# Patient Record
Sex: MALE | Race: NATIVE HAWAIIAN OR OTHER PACIFIC ISLANDER | Employment: OTHER | URBAN - METROPOLITAN AREA
[De-identification: names, ages, dates, MRNs, and addresses within clinical notes are randomized per-mention and may not be internally consistent; named-entity substitution may affect disease eponyms.]

---

## 2019-04-02 ENCOUNTER — OFFICE VISIT (OUTPATIENT)
Dept: FAMILY MEDICINE CLINIC | Facility: CLINIC | Age: 64
End: 2019-04-02
Payer: MEDICARE

## 2019-04-02 VITALS
SYSTOLIC BLOOD PRESSURE: 125 MMHG | DIASTOLIC BLOOD PRESSURE: 81 MMHG | TEMPERATURE: 98 F | WEIGHT: 227 LBS | HEIGHT: 67 IN | HEART RATE: 69 BPM | BODY MASS INDEX: 35.63 KG/M2

## 2019-04-02 DIAGNOSIS — E11.42 TYPE 2 DIABETES MELLITUS WITH DIABETIC POLYNEUROPATHY, WITHOUT LONG-TERM CURRENT USE OF INSULIN (HCC): Primary | ICD-10-CM

## 2019-04-02 DIAGNOSIS — R35.1 BENIGN PROSTATIC HYPERPLASIA WITH NOCTURIA: ICD-10-CM

## 2019-04-02 DIAGNOSIS — E78.00 PURE HYPERCHOLESTEROLEMIA: ICD-10-CM

## 2019-04-02 DIAGNOSIS — N40.1 BENIGN PROSTATIC HYPERPLASIA WITH NOCTURIA: ICD-10-CM

## 2019-04-02 DIAGNOSIS — I10 ESSENTIAL HYPERTENSION: ICD-10-CM

## 2019-04-02 PROCEDURE — G0463 HOSPITAL OUTPT CLINIC VISIT: HCPCS | Performed by: FAMILY MEDICINE

## 2019-04-02 PROCEDURE — 99204 OFFICE O/P NEW MOD 45 MIN: CPT | Performed by: FAMILY MEDICINE

## 2019-04-02 RX ORDER — GLIPIZIDE 5 MG/1
TABLET ORAL
Refills: 2 | COMMUNITY
Start: 2018-12-30 | End: 2019-04-02

## 2019-04-02 RX ORDER — MELOXICAM 7.5 MG/1
TABLET ORAL
Refills: 1 | COMMUNITY
Start: 2019-02-22 | End: 2019-10-01

## 2019-04-02 RX ORDER — GABAPENTIN 600 MG/1
600 TABLET ORAL 3 TIMES DAILY
COMMUNITY
End: 2019-04-02

## 2019-04-02 RX ORDER — BLOOD SUGAR DIAGNOSTIC
STRIP MISCELLANEOUS
Refills: 5 | COMMUNITY
Start: 2019-02-28 | End: 2019-06-10

## 2019-04-02 RX ORDER — DULAGLUTIDE 1.5 MG/.5ML
1.5 INJECTION, SOLUTION SUBCUTANEOUS WEEKLY
Qty: 12 PEN | Refills: 2 | Status: SHIPPED | OUTPATIENT
Start: 2019-04-02 | End: 2019-06-10

## 2019-04-02 RX ORDER — DULAGLUTIDE 1.5 MG/.5ML
INJECTION, SOLUTION SUBCUTANEOUS
Refills: 2 | COMMUNITY
Start: 2019-02-25 | End: 2019-04-02

## 2019-04-02 RX ORDER — ATORVASTATIN CALCIUM 20 MG/1
20 TABLET, FILM COATED ORAL NIGHTLY
COMMUNITY
End: 2019-04-02

## 2019-04-02 RX ORDER — ATORVASTATIN CALCIUM 20 MG/1
20 TABLET, FILM COATED ORAL NIGHTLY
Qty: 90 TABLET | Refills: 3 | Status: SHIPPED | OUTPATIENT
Start: 2019-04-02 | End: 2020-04-04

## 2019-04-02 RX ORDER — KETOCONAZOLE 20 MG/G
CREAM TOPICAL
Refills: 1 | COMMUNITY
Start: 2019-02-25 | End: 2021-06-07

## 2019-04-02 RX ORDER — LOSARTAN POTASSIUM 25 MG/1
TABLET ORAL DAILY
COMMUNITY
End: 2019-04-02

## 2019-04-02 RX ORDER — TAMSULOSIN HYDROCHLORIDE 0.4 MG/1
CAPSULE ORAL
Refills: 3 | COMMUNITY
Start: 2019-02-05 | End: 2019-04-02

## 2019-04-02 RX ORDER — LOSARTAN POTASSIUM 25 MG/1
25 TABLET ORAL DAILY
Qty: 90 TABLET | Refills: 2 | Status: SHIPPED | OUTPATIENT
Start: 2019-04-02 | End: 2019-12-30

## 2019-04-02 RX ORDER — TAMSULOSIN HYDROCHLORIDE 0.4 MG/1
CAPSULE ORAL
Qty: 90 CAPSULE | Refills: 2 | Status: SHIPPED | OUTPATIENT
Start: 2019-04-02 | End: 2020-09-14

## 2019-04-02 RX ORDER — METOPROLOL SUCCINATE 25 MG/1
TABLET, EXTENDED RELEASE ORAL
Refills: 2 | COMMUNITY
Start: 2019-02-25 | End: 2019-04-02

## 2019-04-02 RX ORDER — CANAGLIFLOZIN 300 MG/1
TABLET, FILM COATED ORAL
Refills: 0 | COMMUNITY
Start: 2018-12-04 | End: 2019-04-02

## 2019-04-02 RX ORDER — GLIMEPIRIDE 2 MG/1
2 TABLET ORAL
Qty: 90 TABLET | Refills: 2 | Status: SHIPPED | OUTPATIENT
Start: 2019-04-02 | End: 2019-06-10

## 2019-04-02 RX ORDER — OMEGA-3-ACID ETHYL ESTERS 1 G/1
1 CAPSULE, LIQUID FILLED ORAL DAILY
COMMUNITY
End: 2019-04-02

## 2019-04-02 RX ORDER — METOPROLOL SUCCINATE 25 MG/1
TABLET, EXTENDED RELEASE ORAL
Qty: 90 TABLET | Refills: 2 | Status: SHIPPED | OUTPATIENT
Start: 2019-04-02 | End: 2020-01-01

## 2019-04-02 RX ORDER — PREGABALIN 50 MG/1
50 CAPSULE ORAL 3 TIMES DAILY
Qty: 270 CAPSULE | Refills: 2 | Status: SHIPPED | OUTPATIENT
Start: 2019-04-02 | End: 2020-05-18

## 2019-04-02 NOTE — PROGRESS NOTES
4/2/2019  3:38 PM    Nadeem Barrientos is a 61year old male. Chief complaint(s): Patient presents with:  Establish Care  Diabetes  HTN  Hyperlipidemia    HPI:     Nadeem Barrientos primary complaint is regarding multiple new patient/ complaints.      Pa Current treatment includes Atorvastatin 20 mg (medication) and a low cholesterol/low fat diet. Compliance with treatment has been good. he denies experiencing any hypercholesterolemia related symptoms.   Average lipid levels with medication run slightly M PREVENT ANOTHER HEART ATTACK Disp: 90 tablet Rfl: 2   Losartan Potassium 25 MG Oral Tab Take 1 tablet (25 mg total) by mouth daily. Disp: 90 tablet Rfl: 2   atorvastatin 20 MG Oral Tab Take 1 tablet (20 mg total) by mouth nightly.  Disp: 90 tablet Rfl: 3 Pulmonary/Chest:   Clear to ascultation bilaterally. Abdominal: Soft. Normal appearance and bowel sounds are normal. There is no tenderness. Feet: diabetic foot exam performed    Right Foot:   Protective Sensation: 10 sites tested. 8 sites sensed. tablet (25 mg total) by mouth daily. , Disp: 90 tablet, Rfl: 2  •  atorvastatin 20 MG Oral Tab, Take 1 tablet (20 mg total) by mouth nightly., Disp: 90 tablet, Rfl: 3  •  glimepiride 2 MG Oral Tab, Take 1 tablet (2 mg total) by mouth daily with breakfast., follow-up visit in 4 months. COUNSELING: The patient was counseled concerning the relationship between diabetes control and macrovascular disease including cardiovascular, cerebrovascular and peripheral vascular disease.  The patient was counseled con alcohol, perform routine monitoring of blood pressure with home blood pressure cuff, exercise, reduction of dietary salt intake, take medication as prescribed, try not to miss doses, smoking cessation, weight loss, and stress reduction.     FOLLOW-UP: Sched effects. REFUSALS:  none. FOLLOW-UP: Schedule a follow-up visit in 6 months.       4. Benign prostatic hyperplasia with nocturia    MEDICATIONS: CPM    Requested Prescriptions     Signed Prescriptions Disp Refills   • TRULICITY 1.5 NV[facility]5.6GU [facility]  Requested Prescriptions     Signed Prescriptions Disp Refills   • TRULICITY 1.5 SN/5.1TE Subcutaneous Solution Pen-injector 12 pen 2     Sig: Inject 1.5 mg into the skin once a week.    • tamsulosin HCl 0.4 MG Oral Cap 90 capsule 2     Sig: TK 1 C P

## 2019-04-03 ENCOUNTER — APPOINTMENT (OUTPATIENT)
Dept: LAB | Age: 64
End: 2019-04-03
Attending: FAMILY MEDICINE
Payer: MEDICARE

## 2019-04-03 DIAGNOSIS — E11.42 TYPE 2 DIABETES MELLITUS WITH DIABETIC POLYNEUROPATHY, WITHOUT LONG-TERM CURRENT USE OF INSULIN (HCC): ICD-10-CM

## 2019-04-03 DIAGNOSIS — E78.00 PURE HYPERCHOLESTEROLEMIA: ICD-10-CM

## 2019-04-03 PROCEDURE — 82570 ASSAY OF URINE CREATININE: CPT

## 2019-04-03 PROCEDURE — 82043 UR ALBUMIN QUANTITATIVE: CPT

## 2019-04-03 PROCEDURE — 80061 LIPID PANEL: CPT

## 2019-04-03 PROCEDURE — 83036 HEMOGLOBIN GLYCOSYLATED A1C: CPT

## 2019-04-03 PROCEDURE — 84443 ASSAY THYROID STIM HORMONE: CPT

## 2019-04-03 PROCEDURE — 80053 COMPREHEN METABOLIC PANEL: CPT

## 2019-04-03 PROCEDURE — 36415 COLL VENOUS BLD VENIPUNCTURE: CPT

## 2019-04-09 ENCOUNTER — TELEPHONE (OUTPATIENT)
Dept: FAMILY MEDICINE CLINIC | Facility: CLINIC | Age: 64
End: 2019-04-09

## 2019-04-22 NOTE — TELEPHONE ENCOUNTER
Phone call made spoke with patient name and  verified. Advised pt of his diabetes being uncontrolled advised that Dr Bruce Frausto is recommending he follow a diet, exercise and f/u with MD in 2 months.  Patient verbalized understanding stated that he is in [facility] [place]

## 2019-06-10 ENCOUNTER — OFFICE VISIT (OUTPATIENT)
Dept: FAMILY MEDICINE CLINIC | Facility: CLINIC | Age: 64
End: 2019-06-10
Payer: MEDICARE

## 2019-06-10 VITALS
HEIGHT: 67 IN | TEMPERATURE: 98 F | WEIGHT: 234.81 LBS | DIASTOLIC BLOOD PRESSURE: 74 MMHG | HEART RATE: 61 BPM | SYSTOLIC BLOOD PRESSURE: 130 MMHG | BODY MASS INDEX: 36.85 KG/M2

## 2019-06-10 DIAGNOSIS — M54.32 BILATERAL SCIATICA: ICD-10-CM

## 2019-06-10 DIAGNOSIS — R35.1 BENIGN PROSTATIC HYPERPLASIA WITH NOCTURIA: ICD-10-CM

## 2019-06-10 DIAGNOSIS — E11.65 UNCONTROLLED TYPE 2 DIABETES MELLITUS WITH HYPERGLYCEMIA (HCC): Primary | ICD-10-CM

## 2019-06-10 DIAGNOSIS — N40.1 BENIGN PROSTATIC HYPERPLASIA WITH NOCTURIA: ICD-10-CM

## 2019-06-10 DIAGNOSIS — M54.31 BILATERAL SCIATICA: ICD-10-CM

## 2019-06-10 PROCEDURE — 99214 OFFICE O/P EST MOD 30 MIN: CPT | Performed by: FAMILY MEDICINE

## 2019-06-10 PROCEDURE — G0463 HOSPITAL OUTPT CLINIC VISIT: HCPCS | Performed by: FAMILY MEDICINE

## 2019-06-10 RX ORDER — BLOOD SUGAR DIAGNOSTIC
STRIP MISCELLANEOUS
COMMUNITY
Start: 2016-01-26

## 2019-06-10 RX ORDER — BLOOD SUGAR DIAGNOSTIC
STRIP MISCELLANEOUS
Qty: 100 STRIP | Refills: 5 | Status: SHIPPED | OUTPATIENT
Start: 2019-06-10 | End: 2021-09-27

## 2019-06-10 RX ORDER — GLIMEPIRIDE 2 MG/1
2 TABLET ORAL
Qty: 90 TABLET | Refills: 2 | Status: SHIPPED | OUTPATIENT
Start: 2019-06-10 | End: 2020-03-25

## 2019-06-10 RX ORDER — OMEGA-3-ACID ETHYL ESTERS 1 G/1
1 CAPSULE, LIQUID FILLED ORAL DAILY
COMMUNITY
Start: 2019-06-06 | End: 2021-09-27

## 2019-06-10 RX ORDER — CELECOXIB 200 MG/1
CAPSULE ORAL
COMMUNITY
Start: 2018-12-04 | End: 2020-05-18

## 2019-06-10 RX ORDER — ASPIRIN 81 MG/1
81 TABLET ORAL
COMMUNITY
Start: 2015-02-09 | End: 2019-06-10

## 2019-06-10 RX ORDER — DULAGLUTIDE 1.5 MG/.5ML
1.5 INJECTION, SOLUTION SUBCUTANEOUS WEEKLY
Qty: 12 PEN | Refills: 2 | Status: SHIPPED | OUTPATIENT
Start: 2019-06-10 | End: 2020-05-15

## 2019-06-10 RX ORDER — ASPIRIN 81 MG/1
81 TABLET ORAL DAILY
Qty: 100 TABLET | Refills: 2 | Status: SHIPPED | OUTPATIENT
Start: 2019-06-10

## 2019-06-10 NOTE — PROGRESS NOTES
6/10/2019  3:17 PM    Shalonda Marroquin is a 61year old male. Chief complaint(s): Patient presents with:  Diabetes: f/u  Diabetes  BPH  Chronic back pain. Sciatica    HPI:     Shalonda Marroquin primary complaint is regarding multiple complaintsDiabetes. relief of back pain with medication: NSAID: NSAIDs. Prior back pain hx: recurrent self limited episodes of low back pain in the past, previous osteoarthritis of lumbar spine and previous herniated disc.    Associated symptoms include;   positive numbness or Rfl: 2   Losartan Potassium 25 MG Oral Tab Take 1 tablet (25 mg total) by mouth daily. Disp: 90 tablet Rfl: 2   atorvastatin 20 MG Oral Tab Take 1 tablet (20 mg total) by mouth nightly.  Disp: 90 tablet Rfl: 3   glimepiride 2 MG Oral Tab Take 1 tablet (2 mg performed  Skin: No rash noted. Psychiatric:   Appropriate affect and demeanor.        LABORATORY RESULTS:   No results found for: Dominic Queen   Results for orders placed or performed in visit on 04/03/19   Delmis Hurtado A&P    LABORATORY & ORDERS: Blood test(s) ordered today ; Orders Placed This Encounter      PSA, Total with Reflex to Free [E]    Additional orders include:   MEDICATIONS:  •  Alcohol Swabs (ALCOHOL PADS) 70 % Does not apply Pads, For use prior to checking (ONETOUCH VERIO) In Vitro Strip 100 strip 5     Sig: TEST BID UTD   • TRULICITY 1.5 EI/2.4LX Subcutaneous Solution Pen-injector 12 pen 2     Sig: Inject 1.5 mg into the skin once a week.       REFERRALS: Ophthalmologist    RECOMMENDATIONS: instructed in use Orders Placed This Encounter         Pain Management Referral - [place] Location         RECOMMENDATIONS given include: Please, call our office with any questions or concerns.  Notify Dr Obed Conroy or the [facility] if there is a deterioration or worsen

## 2019-06-11 ENCOUNTER — TELEPHONE (OUTPATIENT)
Dept: FAMILY MEDICINE CLINIC | Facility: CLINIC | Age: 64
End: 2019-06-11

## 2019-06-11 ENCOUNTER — APPOINTMENT (OUTPATIENT)
Dept: LAB | Age: 64
End: 2019-06-11
Attending: FAMILY MEDICINE
Payer: MEDICARE

## 2019-06-11 PROCEDURE — 36415 COLL VENOUS BLD VENIPUNCTURE: CPT | Performed by: FAMILY MEDICINE

## 2019-06-11 PROCEDURE — 84153 ASSAY OF PSA TOTAL: CPT | Performed by: FAMILY MEDICINE

## 2019-06-17 ENCOUNTER — TELEPHONE (OUTPATIENT)
Dept: FAMILY MEDICINE CLINIC | Facility: CLINIC | Age: 64
End: 2019-06-17

## 2019-06-17 NOTE — TELEPHONE ENCOUNTER
Urology referral faxed to [phone] as requested by patient who's at his appointment, fax confirmation received.

## 2019-07-19 ENCOUNTER — TELEPHONE (OUTPATIENT)
Dept: FAMILY MEDICINE CLINIC | Facility: CLINIC | Age: 64
End: 2019-07-19

## 2019-10-03 RX ORDER — MELOXICAM 7.5 MG/1
TABLET ORAL
Qty: 90 TABLET | Refills: 0 | Status: SHIPPED | OUTPATIENT
Start: 2019-10-03 | End: 2019-12-30

## 2019-10-03 NOTE — TELEPHONE ENCOUNTER
Please advise in regards to refill request. Thank You  Refill Protocol Appointment Criteria  · Appointment scheduled in the past 6 months or in the next 3 months  Recent Outpatient Visits            3 months ago Uncontrolled type 2 diabetes mellitus with h

## 2019-12-30 RX ORDER — LOSARTAN POTASSIUM 25 MG/1
TABLET ORAL
Qty: 90 TABLET | Refills: 1 | Status: SHIPPED | OUTPATIENT
Start: 2019-12-30 | End: 2020-05-15

## 2019-12-30 RX ORDER — MELOXICAM 7.5 MG/1
TABLET ORAL
Qty: 90 TABLET | Refills: 0 | Status: SHIPPED | OUTPATIENT
Start: 2019-12-30 | End: 2020-03-30

## 2020-01-01 RX ORDER — METOPROLOL SUCCINATE 25 MG/1
TABLET, EXTENDED RELEASE ORAL
Qty: 90 TABLET | Refills: 0 | Status: SHIPPED | OUTPATIENT
Start: 2020-01-01 | End: 2020-03-30

## 2020-01-24 ENCOUNTER — TELEPHONE (OUTPATIENT)
Dept: CASE MANAGEMENT | Age: 65
End: 2020-01-24

## 2020-01-30 NOTE — TELEPHONE ENCOUNTER
Patient called back and was given information below. He states that he is currently in [facility] and will be back in April and will schedule for then.

## 2020-03-03 ENCOUNTER — OFFICE VISIT (OUTPATIENT)
Dept: FAMILY MEDICINE CLINIC | Facility: CLINIC | Age: 65
End: 2020-03-03
Payer: MEDICARE

## 2020-03-03 VITALS
TEMPERATURE: 98 F | DIASTOLIC BLOOD PRESSURE: 68 MMHG | SYSTOLIC BLOOD PRESSURE: 109 MMHG | WEIGHT: 233 LBS | BODY MASS INDEX: 36.57 KG/M2 | HEIGHT: 67 IN | HEART RATE: 61 BPM

## 2020-03-03 DIAGNOSIS — E11.65 UNCONTROLLED TYPE 2 DIABETES MELLITUS WITH HYPERGLYCEMIA (HCC): Primary | ICD-10-CM

## 2020-03-03 PROBLEM — E66.01 SEVERE OBESITY (BMI 35.0-39.9) WITH COMORBIDITY (HCC): Chronic | Status: ACTIVE | Noted: 2020-03-03

## 2020-03-03 PROCEDURE — 99214 OFFICE O/P EST MOD 30 MIN: CPT | Performed by: FAMILY MEDICINE

## 2020-03-03 RX ORDER — EMPAGLIFLOZIN, METFORMIN HYDROCHLORIDE 25; 1000 MG/1; MG/1
1 TABLET, EXTENDED RELEASE ORAL DAILY
Qty: 90 TABLET | Refills: 1 | Status: SHIPPED | OUTPATIENT
Start: 2020-03-03 | End: 2020-05-15

## 2020-03-03 RX ORDER — EMPAGLIFLOZIN, METFORMIN HYDROCHLORIDE 25; 1000 MG/1; MG/1
TABLET, EXTENDED RELEASE ORAL
COMMUNITY
Start: 2019-10-24 | End: 2020-03-03

## 2020-03-03 RX ORDER — DICLOFENAC POTASSIUM 50 MG/1
50 TABLET, FILM COATED ORAL 3 TIMES DAILY
Qty: 25 TABLET | Refills: 1 | Status: SHIPPED | OUTPATIENT
Start: 2020-03-03 | End: 2020-03-04

## 2020-03-03 NOTE — PROGRESS NOTES
Patient presents with:  Checkup    HPI:   Rachel Flow is a 59year old male who presents to clinic for DM follow up. Current medication regimen for DM includes: Trulicity, Synjardy and glimepiride, losartan, atorvastatin  Last HbA1C: 8.3 %.   Diet co

## 2020-03-04 ENCOUNTER — TELEPHONE (OUTPATIENT)
Dept: FAMILY MEDICINE CLINIC | Facility: CLINIC | Age: 65
End: 2020-03-04

## 2020-03-04 ENCOUNTER — LAB ENCOUNTER (OUTPATIENT)
Dept: LAB | Age: 65
End: 2020-03-04
Attending: FAMILY MEDICINE
Payer: MEDICARE

## 2020-03-04 DIAGNOSIS — M54.32 BILATERAL SCIATICA: Primary | ICD-10-CM

## 2020-03-04 DIAGNOSIS — M54.32 BILATERAL SCIATICA: ICD-10-CM

## 2020-03-04 DIAGNOSIS — M54.31 BILATERAL SCIATICA: ICD-10-CM

## 2020-03-04 DIAGNOSIS — M54.31 BILATERAL SCIATICA: Primary | ICD-10-CM

## 2020-03-04 DIAGNOSIS — E11.65 UNCONTROLLED TYPE 2 DIABETES MELLITUS WITH HYPERGLYCEMIA (HCC): ICD-10-CM

## 2020-03-04 LAB
ALBUMIN SERPL-MCNC: 3.9 G/DL (ref 3.4–5)
ALBUMIN/GLOB SERPL: 1.1 {RATIO} (ref 1–2)
ALP LIVER SERPL-CCNC: 66 U/L (ref 45–117)
ALT SERPL-CCNC: 40 U/L (ref 16–61)
ANION GAP SERPL CALC-SCNC: 6 MMOL/L (ref 0–18)
AST SERPL-CCNC: 18 U/L (ref 15–37)
BILIRUB SERPL-MCNC: 0.6 MG/DL (ref 0.1–2)
BUN BLD-MCNC: 10 MG/DL (ref 7–18)
BUN/CREAT SERPL: 13.7 (ref 10–20)
CALCIUM BLD-MCNC: 9.5 MG/DL (ref 8.5–10.1)
CHLORIDE SERPL-SCNC: 106 MMOL/L (ref 98–112)
CHOLEST SMN-MCNC: 188 MG/DL (ref ?–200)
CO2 SERPL-SCNC: 27 MMOL/L (ref 21–32)
CREAT BLD-MCNC: 0.73 MG/DL (ref 0.7–1.3)
CREAT UR-SCNC: 68.7 MG/DL
EST. AVERAGE GLUCOSE BLD GHB EST-MCNC: 186 MG/DL (ref 68–126)
GLOBULIN PLAS-MCNC: 3.4 G/DL (ref 2.8–4.4)
GLUCOSE BLD-MCNC: 141 MG/DL (ref 70–99)
HBA1C MFR BLD HPLC: 8.1 % (ref ?–5.7)
HDLC SERPL-MCNC: 39 MG/DL (ref 40–59)
LDLC SERPL CALC-MCNC: 86 MG/DL (ref ?–100)
M PROTEIN MFR SERPL ELPH: 7.3 G/DL (ref 6.4–8.2)
MICROALBUMIN UR-MCNC: <0.5 MG/DL
NONHDLC SERPL-MCNC: 149 MG/DL (ref ?–130)
OSMOLALITY SERPL CALC.SUM OF ELEC: 289 MOSM/KG (ref 275–295)
PATIENT FASTING Y/N/NP: YES
PATIENT FASTING Y/N/NP: YES
POTASSIUM SERPL-SCNC: 4 MMOL/L (ref 3.5–5.1)
SODIUM SERPL-SCNC: 139 MMOL/L (ref 136–145)
TRIGL SERPL-MCNC: 317 MG/DL (ref 30–149)
VLDLC SERPL CALC-MCNC: 63 MG/DL (ref 0–30)

## 2020-03-04 PROCEDURE — 83036 HEMOGLOBIN GLYCOSYLATED A1C: CPT

## 2020-03-04 PROCEDURE — 82570 ASSAY OF URINE CREATININE: CPT

## 2020-03-04 PROCEDURE — 82043 UR ALBUMIN QUANTITATIVE: CPT

## 2020-03-04 PROCEDURE — 36415 COLL VENOUS BLD VENIPUNCTURE: CPT

## 2020-03-04 PROCEDURE — 80061 LIPID PANEL: CPT

## 2020-03-04 PROCEDURE — 80053 COMPREHEN METABOLIC PANEL: CPT

## 2020-03-04 NOTE — TELEPHONE ENCOUNTER
Order placed for diclofenac sodium.     - Diclofenac Sodium 50 MG Oral Tab EC; Take 1 tablet (50 mg total) by mouth 2 (two) times daily. Dispense: 30 tablet;  Refill: 1

## 2020-03-04 NOTE — TELEPHONE ENCOUNTER
Pharmacy called in stating that script below is not covered through insurance. He states that the Diclofenac Sodium would be covered in generic form. Please advise.      Current Outpatient Medications:   •  Diclofenac Potassium 50 MG Oral Tab, Take 1 ta

## 2020-03-05 RX ORDER — DICLOFENAC POTASSIUM 50 MG/1
TABLET, FILM COATED ORAL
Qty: 281 TABLET | Refills: 0 | OUTPATIENT
Start: 2020-03-05

## 2020-03-06 NOTE — TELEPHONE ENCOUNTER
Diclofenac sodium was requested as diclofenac potassium was not covered by insurance. Should have been sent to pharmacy yesterday.

## 2020-03-09 NOTE — PROGRESS NOTES
With [facility]  MEENAKSHI#[id number], left message to  patient's voice mail. Spoke with spouse (not GITA), message left to tell patient to call us back regarding the test results. No MyChart.

## 2020-03-17 DIAGNOSIS — M54.31 BILATERAL SCIATICA: ICD-10-CM

## 2020-03-17 DIAGNOSIS — M54.32 BILATERAL SCIATICA: ICD-10-CM

## 2020-03-25 DIAGNOSIS — E11.65 UNCONTROLLED TYPE 2 DIABETES MELLITUS WITH HYPERGLYCEMIA (HCC): ICD-10-CM

## 2020-03-25 RX ORDER — GLIMEPIRIDE 2 MG/1
TABLET ORAL
Qty: 30 TABLET | Refills: 0 | Status: SHIPPED | OUTPATIENT
Start: 2020-03-25 | End: 2020-03-25

## 2020-03-25 RX ORDER — GLIMEPIRIDE 2 MG/1
TABLET ORAL
Qty: 30 TABLET | Refills: 0 | Status: SHIPPED | OUTPATIENT
Start: 2020-03-25 | End: 2020-03-26

## 2020-03-26 DIAGNOSIS — E11.65 UNCONTROLLED TYPE 2 DIABETES MELLITUS WITH HYPERGLYCEMIA (HCC): ICD-10-CM

## 2020-03-26 RX ORDER — GLIMEPIRIDE 2 MG/1
TABLET ORAL
Qty: 30 TABLET | Refills: 0 | Status: SHIPPED | OUTPATIENT
Start: 2020-03-26 | End: 2020-05-15

## 2020-03-30 RX ORDER — MELOXICAM 7.5 MG/1
TABLET ORAL
Qty: 30 TABLET | Refills: 0 | Status: SHIPPED | OUTPATIENT
Start: 2020-03-30 | End: 2020-04-29

## 2020-03-30 RX ORDER — METOPROLOL SUCCINATE 25 MG/1
TABLET, EXTENDED RELEASE ORAL
Qty: 90 TABLET | Refills: 0 | OUTPATIENT
Start: 2020-03-30

## 2020-03-30 RX ORDER — METOPROLOL SUCCINATE 25 MG/1
TABLET, EXTENDED RELEASE ORAL
Qty: 30 TABLET | Refills: 0 | Status: SHIPPED | OUTPATIENT
Start: 2020-03-30 | End: 2020-04-29

## 2020-03-30 RX ORDER — MELOXICAM 7.5 MG/1
TABLET ORAL
Qty: 90 TABLET | Refills: 0 | OUTPATIENT
Start: 2020-03-30

## 2020-04-04 RX ORDER — ATORVASTATIN CALCIUM 20 MG/1
TABLET, FILM COATED ORAL
Qty: 90 TABLET | Refills: 3 | Status: SHIPPED | OUTPATIENT
Start: 2020-04-04 | End: 2020-08-17

## 2020-04-20 DIAGNOSIS — E11.65 UNCONTROLLED TYPE 2 DIABETES MELLITUS WITH HYPERGLYCEMIA (HCC): Primary | ICD-10-CM

## 2020-04-20 DIAGNOSIS — E11.65 UNCONTROLLED TYPE 2 DIABETES MELLITUS WITH HYPERGLYCEMIA (HCC): ICD-10-CM

## 2020-04-20 RX ORDER — LANCETS
EACH MISCELLANEOUS
Qty: 306 EACH | Refills: 0 | Status: SHIPPED | OUTPATIENT
Start: 2020-04-20 | End: 2020-09-16

## 2020-04-20 RX ORDER — LANCETS
EACH MISCELLANEOUS
Qty: 100 EACH | Refills: 0 | Status: SHIPPED | OUTPATIENT
Start: 2020-04-20 | End: 2020-04-20

## 2020-04-20 NOTE — TELEPHONE ENCOUNTER
This is being sent to you without review by the Triage staff due to the high call volumes created by the COVID-19 virus, per the email sent by Dr. Lopez Or on 3/19/20. Thank you for your support.     Centralized Nurse Triage Team

## 2020-04-20 NOTE — TELEPHONE ENCOUNTER
pharm states that the pt brand for diabetic supplies needs to be changed to Accucheck. Need Meter, strips and lancets.

## 2020-04-29 RX ORDER — MELOXICAM 7.5 MG/1
TABLET ORAL
Qty: 30 TABLET | Refills: 0 | Status: SHIPPED | OUTPATIENT
Start: 2020-04-29 | End: 2020-04-30

## 2020-04-29 RX ORDER — METOPROLOL SUCCINATE 25 MG/1
TABLET, EXTENDED RELEASE ORAL
Qty: 30 TABLET | Refills: 0 | Status: SHIPPED | OUTPATIENT
Start: 2020-04-29 | End: 2020-04-30

## 2020-04-30 RX ORDER — MELOXICAM 7.5 MG/1
TABLET ORAL
Qty: 90 TABLET | Refills: 0 | Status: SHIPPED | OUTPATIENT
Start: 2020-04-30 | End: 2020-05-18

## 2020-04-30 RX ORDER — METOPROLOL SUCCINATE 25 MG/1
TABLET, EXTENDED RELEASE ORAL
Qty: 90 TABLET | Refills: 0 | Status: SHIPPED | OUTPATIENT
Start: 2020-04-30 | End: 2020-05-15

## 2020-05-15 ENCOUNTER — OFFICE VISIT (OUTPATIENT)
Dept: INTERNAL MEDICINE CLINIC | Facility: CLINIC | Age: 65
End: 2020-05-15
Payer: MEDICARE

## 2020-05-15 VITALS
SYSTOLIC BLOOD PRESSURE: 110 MMHG | DIASTOLIC BLOOD PRESSURE: 74 MMHG | HEART RATE: 101 BPM | HEIGHT: 66 IN | RESPIRATION RATE: 12 BRPM | BODY MASS INDEX: 36.64 KG/M2 | WEIGHT: 228 LBS

## 2020-05-15 DIAGNOSIS — I10 ESSENTIAL HYPERTENSION: ICD-10-CM

## 2020-05-15 DIAGNOSIS — K64.9 HEMORRHOIDS, UNSPECIFIED HEMORRHOID TYPE: ICD-10-CM

## 2020-05-15 DIAGNOSIS — M54.50 CHRONIC BILATERAL LOW BACK PAIN, UNSPECIFIED WHETHER SCIATICA PRESENT: ICD-10-CM

## 2020-05-15 DIAGNOSIS — N40.0 BENIGN PROSTATIC HYPERPLASIA WITHOUT LOWER URINARY TRACT SYMPTOMS: ICD-10-CM

## 2020-05-15 DIAGNOSIS — E11.65 UNCONTROLLED TYPE 2 DIABETES MELLITUS WITH HYPERGLYCEMIA (HCC): Primary | ICD-10-CM

## 2020-05-15 DIAGNOSIS — E78.2 MIXED HYPERLIPIDEMIA: ICD-10-CM

## 2020-05-15 DIAGNOSIS — G89.29 CHRONIC BILATERAL LOW BACK PAIN, UNSPECIFIED WHETHER SCIATICA PRESENT: ICD-10-CM

## 2020-05-15 DIAGNOSIS — Z86.010 HISTORY OF COLONIC POLYPS: ICD-10-CM

## 2020-05-15 PROCEDURE — 99214 OFFICE O/P EST MOD 30 MIN: CPT | Performed by: INTERNAL MEDICINE

## 2020-05-15 PROCEDURE — G0009 ADMIN PNEUMOCOCCAL VACCINE: HCPCS | Performed by: INTERNAL MEDICINE

## 2020-05-15 PROCEDURE — 90732 PPSV23 VACC 2 YRS+ SUBQ/IM: CPT | Performed by: INTERNAL MEDICINE

## 2020-05-15 RX ORDER — LOSARTAN POTASSIUM 25 MG/1
25 TABLET ORAL DAILY
Qty: 90 TABLET | Refills: 1 | Status: SHIPPED | OUTPATIENT
Start: 2020-05-15 | End: 2020-08-17

## 2020-05-15 RX ORDER — GLIMEPIRIDE 2 MG/1
2 TABLET ORAL 2 TIMES DAILY
Qty: 180 TABLET | Refills: 1 | Status: SHIPPED | OUTPATIENT
Start: 2020-05-15 | End: 2020-05-21

## 2020-05-15 RX ORDER — DULAGLUTIDE 1.5 MG/.5ML
1.5 INJECTION, SOLUTION SUBCUTANEOUS WEEKLY
Qty: 12 PEN | Refills: 1 | Status: SHIPPED | OUTPATIENT
Start: 2020-05-15 | End: 2020-08-17

## 2020-05-15 RX ORDER — METOPROLOL SUCCINATE 25 MG/1
25 TABLET, EXTENDED RELEASE ORAL DAILY
Qty: 90 TABLET | Refills: 1 | Status: SHIPPED | OUTPATIENT
Start: 2020-05-15 | End: 2020-08-17

## 2020-05-15 RX ORDER — EMPAGLIFLOZIN, METFORMIN HYDROCHLORIDE 25; 1000 MG/1; MG/1
1 TABLET, EXTENDED RELEASE ORAL DAILY
Qty: 90 TABLET | Refills: 1 | Status: SHIPPED | OUTPATIENT
Start: 2020-05-15 | End: 2020-08-17

## 2020-05-18 ENCOUNTER — TELEPHONE (OUTPATIENT)
Dept: NEUROLOGY | Facility: CLINIC | Age: 65
End: 2020-05-18

## 2020-05-18 ENCOUNTER — OFFICE VISIT (OUTPATIENT)
Dept: NEUROLOGY | Facility: CLINIC | Age: 65
End: 2020-05-18
Payer: MEDICARE

## 2020-05-18 VITALS — HEIGHT: 66 IN | WEIGHT: 230 LBS | BODY MASS INDEX: 36.96 KG/M2

## 2020-05-18 DIAGNOSIS — M54.16 LEFT LUMBAR RADICULITIS: Primary | ICD-10-CM

## 2020-05-18 PROCEDURE — 99204 OFFICE O/P NEW MOD 45 MIN: CPT | Performed by: PHYSICAL MEDICINE & REHABILITATION

## 2020-05-18 RX ORDER — DULOXETIN HYDROCHLORIDE 30 MG/1
30 CAPSULE, DELAYED RELEASE ORAL DAILY
Qty: 30 CAPSULE | Refills: 0 | Status: SHIPPED | OUTPATIENT
Start: 2020-05-18 | End: 2020-06-15

## 2020-05-18 NOTE — H&P
Shala Barajas    History and Physical    Marden Ast Patient Status:  No patient class for patient encounter    10/27/1955 MRN [id number]   Location [place] [address] Attending No att. providers found   [facility] reasons unknown. Pain is described as a constant sharp pain with associated numbness in the dorsum of the foot that worsens with lifting and pushing.        History     Past Medical History:   Diagnosis Date   • Cataracts, bilateral    • Diabetes ([facility] [address]    • Pulmonary/Chest: Effort normal and breath sounds normal.   Abdominal: Soft. Bowel sounds are normal. Musculoskeletal:      Comments: Lumbar range of motion: Pain with lumbar flexion and extension, extension worse than flexion.     Palpation: ttp bilateral

## 2020-05-18 NOTE — TELEPHONE ENCOUNTER
[facility]  for authorization of approval of MRI L-spine wo cpt code 02881. [facility].    who initiated request. Approved with Authorization # [id number]               effective 05/18/20 to 08/16/20   Will call Pt. to inform.  Pt. In

## 2020-05-20 ENCOUNTER — TELEPHONE (OUTPATIENT)
Dept: FAMILY MEDICINE CLINIC | Facility: CLINIC | Age: 65
End: 2020-05-20

## 2020-05-20 NOTE — TELEPHONE ENCOUNTER
• Hydrocortisone Acetate (ANUSOL-HC) 25 MG Rectal Suppos Place 1 suppository (25 mg total) rectally 2 (two) times daily for 14 days. 14 suppository 0     Message:Drug not cover by patient plan.  The preferred alternative is ALTDRUGTHERAPY prerred product re

## 2020-05-21 ENCOUNTER — HOSPITAL ENCOUNTER (OUTPATIENT)
Dept: MRI IMAGING | Age: 65
Discharge: HOME | End: 2020-05-21
Attending: PHYSICAL MEDICINE & REHABILITATION
Payer: MEDICARE

## 2020-05-21 DIAGNOSIS — M54.16 LEFT LUMBAR RADICULITIS: ICD-10-CM

## 2020-05-21 DIAGNOSIS — E11.65 UNCONTROLLED TYPE 2 DIABETES MELLITUS WITH HYPERGLYCEMIA (HCC): ICD-10-CM

## 2020-05-21 PROCEDURE — 72148 MRI LUMBAR SPINE W/O DYE: CPT | Performed by: PHYSICAL MEDICINE & REHABILITATION

## 2020-05-21 RX ORDER — HYDROCORTISONE 10 MG/G
1 CREAM TOPICAL DAILY
Qty: 30 G | Refills: 1 | Status: SHIPPED | OUTPATIENT
Start: 2020-05-21 | End: 2020-08-17

## 2020-05-21 RX ORDER — GLIMEPIRIDE 2 MG/1
TABLET ORAL
Qty: 10 TABLET | Refills: 0 | Status: SHIPPED | OUTPATIENT
Start: 2020-05-21 | End: 2020-08-17

## 2020-05-22 ENCOUNTER — TELEPHONE (OUTPATIENT)
Dept: INTERNAL MEDICINE CLINIC | Facility: CLINIC | Age: 65
End: 2020-05-22

## 2020-05-22 RX ORDER — HYDROCORTISONE 25 MG/G
CREAM TOPICAL
Qty: 28 G | Refills: 0 | Status: SHIPPED | OUTPATIENT
Start: 2020-05-22

## 2020-05-22 NOTE — TELEPHONE ENCOUNTER
Hydrocortisone Acetate (ANUSOL-HC) 25 MG Rectal Suppos 14 suppository 0 5/15/2020 5/29/2020   Sig:   Place 1 suppository (25 mg total) rectally 2 (two) times daily for 14 days.      Route: Jing Fernandez     Order #: [id number]       Patient states insurance edgar

## 2020-05-27 ENCOUNTER — TELEPHONE (OUTPATIENT)
Dept: NEUROLOGY | Facility: CLINIC | Age: 65
End: 2020-05-27

## 2020-05-27 NOTE — TELEPHONE ENCOUNTER
----- Message from David Saxena DO sent at 5/22/2020  2:02 PM CDT -----  Please let the patient know he has narrowing where the nerve exits at one of the lower levels of the back that is likely causing his pain.  I would like him to follow up so we can [facility]

## 2020-06-05 ENCOUNTER — TELEPHONE (OUTPATIENT)
Dept: GASTROENTEROLOGY | Facility: CLINIC | Age: 65
End: 2020-06-05

## 2020-06-05 ENCOUNTER — OFFICE VISIT (OUTPATIENT)
Dept: GASTROENTEROLOGY | Facility: CLINIC | Age: 65
End: 2020-06-05
Payer: MEDICARE

## 2020-06-05 VITALS
DIASTOLIC BLOOD PRESSURE: 81 MMHG | WEIGHT: 234 LBS | HEIGHT: 66 IN | SYSTOLIC BLOOD PRESSURE: 125 MMHG | HEART RATE: 78 BPM | BODY MASS INDEX: 37.61 KG/M2

## 2020-06-05 DIAGNOSIS — K62.5 RECTAL BLEEDING: Primary | ICD-10-CM

## 2020-06-05 DIAGNOSIS — R10.9 LEFT SIDED ABDOMINAL PAIN: ICD-10-CM

## 2020-06-05 DIAGNOSIS — K92.1 BLOOD IN STOOL: Primary | ICD-10-CM

## 2020-06-05 PROCEDURE — 99203 OFFICE O/P NEW LOW 30 MIN: CPT | Performed by: INTERNAL MEDICINE

## 2020-06-05 RX ORDER — POLYETHYLENE GLYCOL 3350, SODIUM CHLORIDE, POTASSIUM CHLORIDE, SODIUM BICARBONATE, AND SODIUM SULFATE 240; 5.84; 2.98; 6.72; 22.72 G/4L; G/4L; G/4L; G/4L; G/4L
POWDER, FOR SOLUTION ORAL
Qty: 1 BOTTLE | Refills: 0 | Status: SHIPPED | OUTPATIENT
Start: 2020-06-05 | End: 2020-08-17

## 2020-06-05 NOTE — PROGRESS NOTES
HPI:    Patient ID: Leatha Cummings is a 59year old man with BMI 37, history type 2 diabetes hypertension dyslipidemia; recent hemoglobin A1c 8.1% on 3/4/2020. Mr Shekhar Warren is referred by Jalen Panda and Dr. Blanca Brownlee for colonoscop Metoprolol Succinate ER 25 MG Oral Tablet 24 Hr Take 1 tablet (25 mg total) by mouth daily. 90 tablet 1   • SYNJARDY XR  MG Oral Tablet 24 Hr Take 1 tablet by mouth daily.  90 tablet 1   • TRULICITY 1.5 SL/1.1JM Subcutaneous Solution Pen-injector Inj place, and time. Skin: Skin is warm and dry. ASSESSMENT/PLAN:   No diagnosis found. No recent CBC or labs. Seeing Dr. Jasson Mendez this week likely for updated lab testing.     59year old man with BMI 37, history type 2 diabetes hypertension

## 2020-06-05 NOTE — PATIENT INSTRUCTIONS
1. Daily laxative regimen to make you more regular. Goal is 1-2 easy bowel movements per day. Need to take every day or several times per week. Options:    A.   Stool softeners - Colace/docusate - 2-6 pills per day (all at once or 1-2 twice per day)

## 2020-06-05 NOTE — TELEPHONE ENCOUNTER
Scheduled for:  Colonoscopy [address]   Provider Name:  Dr Arnold Kaplan  Date: 09/08/2020  Location:  [facility]  Sedation:  MAC  Time:  10:30am (pt is aware to arrive at 9:30am)    Prep:  Colytle  Meds/Allergies Reconciled?:  Physician reviewed  Diagnosis with codes: Rect

## 2020-06-06 ENCOUNTER — APPOINTMENT (OUTPATIENT)
Dept: LAB | Age: 65
End: 2020-06-06
Attending: FAMILY MEDICINE
Payer: MEDICARE

## 2020-06-06 ENCOUNTER — OFFICE VISIT (OUTPATIENT)
Dept: FAMILY MEDICINE CLINIC | Facility: CLINIC | Age: 65
End: 2020-06-06
Payer: MEDICARE

## 2020-06-06 VITALS
WEIGHT: 234 LBS | SYSTOLIC BLOOD PRESSURE: 119 MMHG | HEART RATE: 72 BPM | HEIGHT: 66 IN | DIASTOLIC BLOOD PRESSURE: 72 MMHG | TEMPERATURE: 97 F | BODY MASS INDEX: 37.61 KG/M2

## 2020-06-06 DIAGNOSIS — M54.31 BILATERAL SCIATICA: ICD-10-CM

## 2020-06-06 DIAGNOSIS — N40.1 BENIGN PROSTATIC HYPERPLASIA WITH NOCTURIA: ICD-10-CM

## 2020-06-06 DIAGNOSIS — R35.1 BENIGN PROSTATIC HYPERPLASIA WITH NOCTURIA: ICD-10-CM

## 2020-06-06 DIAGNOSIS — E11.65 UNCONTROLLED TYPE 2 DIABETES MELLITUS WITH HYPERGLYCEMIA (HCC): ICD-10-CM

## 2020-06-06 DIAGNOSIS — E55.9 HYPOVITAMINOSIS D: ICD-10-CM

## 2020-06-06 DIAGNOSIS — I10 ESSENTIAL HYPERTENSION: ICD-10-CM

## 2020-06-06 DIAGNOSIS — E78.00 PURE HYPERCHOLESTEROLEMIA: ICD-10-CM

## 2020-06-06 DIAGNOSIS — Z00.00 MEDICARE ANNUAL WELLNESS VISIT, SUBSEQUENT: Primary | ICD-10-CM

## 2020-06-06 DIAGNOSIS — M54.32 BILATERAL SCIATICA: ICD-10-CM

## 2020-06-06 DIAGNOSIS — R35.1 NOCTURIA: ICD-10-CM

## 2020-06-06 PROCEDURE — 99396 PREV VISIT EST AGE 40-64: CPT | Performed by: FAMILY MEDICINE

## 2020-06-06 PROCEDURE — 82306 VITAMIN D 25 HYDROXY: CPT | Performed by: FAMILY MEDICINE

## 2020-06-06 PROCEDURE — 36415 COLL VENOUS BLD VENIPUNCTURE: CPT | Performed by: FAMILY MEDICINE

## 2020-06-06 PROCEDURE — 81001 URINALYSIS AUTO W/SCOPE: CPT | Performed by: FAMILY MEDICINE

## 2020-06-06 PROCEDURE — 81015 MICROSCOPIC EXAM OF URINE: CPT | Performed by: FAMILY MEDICINE

## 2020-06-06 PROCEDURE — 84153 ASSAY OF PSA TOTAL: CPT | Performed by: FAMILY MEDICINE

## 2020-06-06 PROCEDURE — 84443 ASSAY THYROID STIM HORMONE: CPT

## 2020-06-06 PROCEDURE — 93005 ELECTROCARDIOGRAM TRACING: CPT

## 2020-06-06 PROCEDURE — 96160 PT-FOCUSED HLTH RISK ASSMT: CPT | Performed by: FAMILY MEDICINE

## 2020-06-06 PROCEDURE — 93010 ELECTROCARDIOGRAM REPORT: CPT | Performed by: FAMILY MEDICINE

## 2020-06-06 PROCEDURE — G0438 PPPS, INITIAL VISIT: HCPCS | Performed by: FAMILY MEDICINE

## 2020-06-06 NOTE — PROGRESS NOTES
HPI:   Leatha Cummings is a 59year old male who presents for a Medicare Subsequent Annual Wellness visit (Pt already had Initial Annual Wellness). Leatha Cummings is a 59year old male is here for routine periodic health screening and examination. Advance care planning including the explanation and discussion of advance directives standard forms performed Face to Face with patient and Family/surrogate (if present), and forms available to patient in AVS         He smoked tobacco in the past but [employer] revision,knee,intra-artic (Left). His family history includes Diabetes in his mother and sister. SOCIAL HISTORY:   He  reports that he quit smoking about 27 years ago. He quit after 20.00 years of use.  He has never used smokeless tobacco. He reports c talking at the same time:  Yes   I have trouble understanding things on the TV:  No I have to strain to understand conversations:  No   I have to worry about missing the telephone ring or doorbell:  No I have trouble hearing conversations in a noisy backgr Normal appearance and bowel sounds are normal. He exhibits no distension and no mass. There is no splenomegaly or hepatomegaly. There is no tenderness.  Hernia confirmed negative in the ventral area, confirmed negative in the right inguinal area and confirm PSA, Total with Reflex to Free [E]   In-House; Urine dip. Test/Procedures done today include: EKG. IMMUNIZATIONS: none given today.     RECOMMENDATIONS given include: ANTICIPATORY GUIDANCE  topics covered today include: safety (i.e. seat belts, helmets, Albert Carmona MD, 6/6/2020     General Health     In the past six months, have you lost more than 10 pounds without trying?: 2 - No  Has your appetite been poor?: No  How does the patient maintain a good energy level?: Daily Walks  How would you describe your (Pneumovax)  Covered Once after 65 05/15/2020 Please get once after your 65th birthday    Hepatitis B for Moderate/High Risk 11/07/2015 Medium/high risk factors:   End-stage renal disease   Hemophiliacs who received Factor VIII or IX concentrates   Clients

## 2020-06-08 RX ORDER — ERGOCALCIFEROL 1.25 MG/1
50000 CAPSULE ORAL WEEKLY
Qty: 12 CAPSULE | Refills: 4 | Status: SHIPPED | OUTPATIENT
Start: 2020-06-08 | End: 2020-07-08

## 2020-06-15 ENCOUNTER — OFFICE VISIT (OUTPATIENT)
Dept: NEUROLOGY | Facility: CLINIC | Age: 65
End: 2020-06-15
Payer: MEDICARE

## 2020-06-15 VITALS
WEIGHT: 228 LBS | HEART RATE: 62 BPM | SYSTOLIC BLOOD PRESSURE: 126 MMHG | RESPIRATION RATE: 14 BRPM | BODY MASS INDEX: 36.64 KG/M2 | DIASTOLIC BLOOD PRESSURE: 60 MMHG | HEIGHT: 66 IN

## 2020-06-15 DIAGNOSIS — M54.16 LEFT LUMBAR RADICULITIS: Primary | ICD-10-CM

## 2020-06-15 DIAGNOSIS — M43.16 SPONDYLOLISTHESIS OF LUMBAR REGION: ICD-10-CM

## 2020-06-15 DIAGNOSIS — M48.061 LUMBAR FORAMINAL STENOSIS: ICD-10-CM

## 2020-06-15 PROCEDURE — 99214 OFFICE O/P EST MOD 30 MIN: CPT | Performed by: PHYSICAL MEDICINE & REHABILITATION

## 2020-06-15 RX ORDER — DULOXETIN HYDROCHLORIDE 30 MG/1
60 CAPSULE, DELAYED RELEASE ORAL DAILY
Qty: 180 CAPSULE | Refills: 0 | Status: SHIPPED | OUTPATIENT
Start: 2020-06-15 | End: 2020-09-14

## 2020-06-15 NOTE — PROGRESS NOTES
HPI:    Patient ID: Vonnie Cedillo is a 59year old male. 80-year-old male presents for follow-up. He was started on duloxetine 30 mg daily, and had an MRI of the lumbar spine of which she returns to discuss the results.   He continues to have centra tablet 3   • DICLOFENAC SODIUM 50 MG Oral Tab EC TAKE 1 TABLET BY MOUTH TWICE DAILY 180 tablet 0   • aspirin EC 81 MG Oral Tab EC Take 1 tablet (81 mg total) by mouth daily.  100 tablet 2   • tamsulosin HCl 0.4 MG Oral Cap TK 1 C PO D 90 capsule 2   • PEG 3 cyst measuring 2.1 cm. Small subcentimeter cyst in the interpolar region left kidney with additional tiny subcentimeter   cysts. BONES:             Mild dextroscoliosis lumbar spine.   Transitional vertebral segment at the lumbar sacral junction partial superior articular facet left-sided L4. Asymmetric right severe lateral recess stenosis related to marked right facet arthrosis and thickening of the ligamentum flavum.   Bilateral foraminal encroachment from   bulging disc material.  Severe left and criti

## 2020-06-16 DIAGNOSIS — M54.32 BILATERAL SCIATICA: ICD-10-CM

## 2020-06-16 DIAGNOSIS — M54.31 BILATERAL SCIATICA: ICD-10-CM

## 2020-08-17 ENCOUNTER — OFFICE VISIT (OUTPATIENT)
Dept: FAMILY MEDICINE CLINIC | Facility: CLINIC | Age: 65
End: 2020-08-17
Payer: MEDICARE

## 2020-08-17 VITALS
HEART RATE: 71 BPM | BODY MASS INDEX: 36.96 KG/M2 | SYSTOLIC BLOOD PRESSURE: 108 MMHG | DIASTOLIC BLOOD PRESSURE: 72 MMHG | WEIGHT: 230 LBS | HEIGHT: 66 IN

## 2020-08-17 DIAGNOSIS — E11.65 UNCONTROLLED TYPE 2 DIABETES MELLITUS WITH HYPERGLYCEMIA (HCC): Primary | ICD-10-CM

## 2020-08-17 DIAGNOSIS — K21.9 GASTROESOPHAGEAL REFLUX DISEASE WITHOUT ESOPHAGITIS: ICD-10-CM

## 2020-08-17 DIAGNOSIS — E78.00 PURE HYPERCHOLESTEROLEMIA: ICD-10-CM

## 2020-08-17 DIAGNOSIS — I10 ESSENTIAL HYPERTENSION: ICD-10-CM

## 2020-08-17 PROCEDURE — 3078F DIAST BP <80 MM HG: CPT | Performed by: FAMILY MEDICINE

## 2020-08-17 PROCEDURE — 3074F SYST BP LT 130 MM HG: CPT | Performed by: FAMILY MEDICINE

## 2020-08-17 PROCEDURE — 3008F BODY MASS INDEX DOCD: CPT | Performed by: FAMILY MEDICINE

## 2020-08-17 PROCEDURE — 99214 OFFICE O/P EST MOD 30 MIN: CPT | Performed by: FAMILY MEDICINE

## 2020-08-17 RX ORDER — GLIMEPIRIDE 2 MG/1
2 TABLET ORAL
Qty: 90 TABLET | Refills: 1 | Status: SHIPPED | OUTPATIENT
Start: 2020-08-17 | End: 2021-03-03

## 2020-08-17 RX ORDER — DULAGLUTIDE 1.5 MG/.5ML
1.5 INJECTION, SOLUTION SUBCUTANEOUS WEEKLY
Qty: 12 PEN | Refills: 1 | Status: SHIPPED | OUTPATIENT
Start: 2020-08-17 | End: 2021-05-27

## 2020-08-17 RX ORDER — EMPAGLIFLOZIN, METFORMIN HYDROCHLORIDE 25; 1000 MG/1; MG/1
1 TABLET, EXTENDED RELEASE ORAL DAILY
Qty: 90 TABLET | Refills: 1 | Status: SHIPPED | OUTPATIENT
Start: 2020-08-17 | End: 2021-06-01

## 2020-08-17 RX ORDER — LOSARTAN POTASSIUM 25 MG/1
25 TABLET ORAL DAILY
Qty: 90 TABLET | Refills: 1 | Status: SHIPPED | OUTPATIENT
Start: 2020-08-17 | End: 2021-06-18

## 2020-08-17 RX ORDER — ATORVASTATIN CALCIUM 20 MG/1
20 TABLET, FILM COATED ORAL NIGHTLY
Qty: 90 TABLET | Refills: 3 | Status: SHIPPED | OUTPATIENT
Start: 2020-08-17 | End: 2020-08-21

## 2020-08-17 RX ORDER — OMEPRAZOLE 40 MG/1
40 CAPSULE, DELAYED RELEASE ORAL DAILY
Qty: 30 CAPSULE | Refills: 3 | Status: SHIPPED | OUTPATIENT
Start: 2020-08-17 | End: 2020-09-14

## 2020-08-17 RX ORDER — METOPROLOL SUCCINATE 25 MG/1
25 TABLET, EXTENDED RELEASE ORAL DAILY
Qty: 90 TABLET | Refills: 1 | Status: SHIPPED | OUTPATIENT
Start: 2020-08-17 | End: 2021-06-18

## 2020-08-17 NOTE — PROGRESS NOTES
8/17/2020  11:55 AM    Miriam Barkley is a 59year old male. Chief complaint(s): Patient presents with:  Diabetes: F/U  Abdominal Pain    HPI:     Miriam Barkley primary complaint is regarding multiple complaints.      Cristal Acuna Atorvastatin 20 mg (medication) and a low cholesterol/low fat diet. Compliance with treatment has been good. he denies experiencing any hypercholesterolemia related symptoms. Average lipid levels with medication run slightly Medium per patient report. Medication Sig Dispense Refill   • TRULICITY 1.5 EI/3.6CH Subcutaneous Solution Pen-injector Inject 1.5 mg into the skin once a week. 12 pen 1   • Losartan Potassium 25 MG Oral Tab Take 1 tablet (25 mg total) by mouth daily.  90 tablet 1   • Metoprolol Byrne for chills, fatigue and fever. Eyes: Negative for visual disturbance. Respiratory: Negative for shortness of breath and wheezing. Cardiovascular: Negative for chest pain and palpitations.    Gastrointestinal: Positive for heartburn and abdominal pain [E]      ** Microalb/Creat Ratio, Random Urine      **CMP  Comp Metabolic Panel (14) [E]    Additional orders include: Added insulin today  MEDICATIONS:  •  TRULICITY 1.5 TW/3.1OM Subcutaneous Solution Pen-injector, Inject 1.5 mg into the skin once a week. Oral Cap, 1 g daily.   , Disp: , Rfl:   •  Glucose Blood (ONETOUCH VERIO) In Vitro Strip, TEST BID UTD, Disp: 100 strip, Rfl: 5  •  ketoconazole 2 % External Cream, RUB ON THE AFFECTED SKIN AREA BID FOR ITCHING, Disp: , Rfl: 1  •  ONETOUCH DELICA LANCETS FI concerning the relationship between diabetes control and retinopathy, nephropathy, and neuropathy. Advised as to the targets of pre-meal glucoses ( mg/dl) and post meal glucoses (<140-160 mg/dl) Home glucose testing discussed.  The A1c target of <7% a 6 months.         3. Pure hypercholesterolemia   Hyperlipidemia     MEDICATIONS:   Requested Prescriptions     Signed Prescriptions Disp Refills   • TRULICITY 1.5 HO/7.5LC Subcutaneous Solution Pen-injector 12 pen 1     Sig: Inject 1.5 mg into the skin once Pen-injector 12 pen 1     Sig: Inject 1.5 mg into the skin once a week. • Losartan Potassium 25 MG Oral Tab 90 tablet 1     Sig: Take 1 tablet (25 mg total) by mouth daily.    • Metoprolol Succinate ER 25 MG Oral Tablet 24 Hr 90 tablet 1     Sig: Take 1 t Tablet 24 Hr 90 tablet 1     Sig: Take 1 tablet (25 mg total) by mouth daily.    • glimepiride 2 MG Oral Tab 90 tablet 1     Sig: Take 1 tablet (2 mg total) by mouth daily with breakfast.   • atorvastatin 20 MG Oral Tab 90 tablet 3     Sig: Take 1 tablet (2

## 2020-08-19 ENCOUNTER — LAB ENCOUNTER (OUTPATIENT)
Dept: LAB | Age: 65
End: 2020-08-19
Attending: FAMILY MEDICINE
Payer: MEDICARE

## 2020-08-19 DIAGNOSIS — E78.00 PURE HYPERCHOLESTEROLEMIA: ICD-10-CM

## 2020-08-19 DIAGNOSIS — E11.65 UNCONTROLLED TYPE 2 DIABETES MELLITUS WITH HYPERGLYCEMIA (HCC): ICD-10-CM

## 2020-08-19 LAB
ALBUMIN SERPL-MCNC: 3.8 G/DL (ref 3.4–5)
ALBUMIN/GLOB SERPL: 0.9 {RATIO} (ref 1–2)
ALP LIVER SERPL-CCNC: 70 U/L (ref 45–117)
ALT SERPL-CCNC: 31 U/L (ref 16–61)
ANION GAP SERPL CALC-SCNC: 5 MMOL/L (ref 0–18)
AST SERPL-CCNC: 11 U/L (ref 15–37)
BILIRUB SERPL-MCNC: 0.4 MG/DL (ref 0.1–2)
BUN BLD-MCNC: 18 MG/DL (ref 7–18)
BUN/CREAT SERPL: 21.7 (ref 10–20)
CALCIUM BLD-MCNC: 8.9 MG/DL (ref 8.5–10.1)
CHLORIDE SERPL-SCNC: 108 MMOL/L (ref 98–112)
CHOLEST SMN-MCNC: 232 MG/DL (ref ?–200)
CO2 SERPL-SCNC: 26 MMOL/L (ref 21–32)
CREAT BLD-MCNC: 0.83 MG/DL (ref 0.7–1.3)
CREAT UR-SCNC: 58.9 MG/DL
EST. AVERAGE GLUCOSE BLD GHB EST-MCNC: 194 MG/DL (ref 68–126)
GLOBULIN PLAS-MCNC: 4.3 G/DL (ref 2.8–4.4)
GLUCOSE BLD-MCNC: 190 MG/DL (ref 70–99)
HBA1C MFR BLD HPLC: 8.4 % (ref ?–5.7)
HDLC SERPL-MCNC: 48 MG/DL (ref 40–59)
LDLC SERPL CALC-MCNC: 132 MG/DL (ref ?–100)
M PROTEIN MFR SERPL ELPH: 8.1 G/DL (ref 6.4–8.2)
MICROALBUMIN UR-MCNC: <0.5 MG/DL
NONHDLC SERPL-MCNC: 184 MG/DL (ref ?–130)
OSMOLALITY SERPL CALC.SUM OF ELEC: 295 MOSM/KG (ref 275–295)
PATIENT FASTING Y/N/NP: YES
PATIENT FASTING Y/N/NP: YES
POTASSIUM SERPL-SCNC: 4.2 MMOL/L (ref 3.5–5.1)
SODIUM SERPL-SCNC: 139 MMOL/L (ref 136–145)
TRIGL SERPL-MCNC: 259 MG/DL (ref 30–149)
VLDLC SERPL CALC-MCNC: 52 MG/DL (ref 0–30)

## 2020-08-19 PROCEDURE — 82043 UR ALBUMIN QUANTITATIVE: CPT

## 2020-08-19 PROCEDURE — 82570 ASSAY OF URINE CREATININE: CPT

## 2020-08-19 PROCEDURE — 36415 COLL VENOUS BLD VENIPUNCTURE: CPT

## 2020-08-19 PROCEDURE — 83036 HEMOGLOBIN GLYCOSYLATED A1C: CPT

## 2020-08-19 PROCEDURE — 80061 LIPID PANEL: CPT

## 2020-08-19 PROCEDURE — 80053 COMPREHEN METABOLIC PANEL: CPT

## 2020-08-21 ENCOUNTER — VIRTUAL PHONE E/M (OUTPATIENT)
Dept: FAMILY MEDICINE CLINIC | Facility: CLINIC | Age: 65
End: 2020-08-21
Payer: MEDICARE

## 2020-08-21 DIAGNOSIS — E78.00 PURE HYPERCHOLESTEROLEMIA: ICD-10-CM

## 2020-08-21 DIAGNOSIS — E11.65 UNCONTROLLED TYPE 2 DIABETES MELLITUS WITH HYPERGLYCEMIA (HCC): Primary | ICD-10-CM

## 2020-08-21 PROCEDURE — 99443 PHONE E/M BY PHYS 21-30 MIN: CPT | Performed by: FAMILY MEDICINE

## 2020-08-21 RX ORDER — ATORVASTATIN CALCIUM 40 MG/1
40 TABLET, FILM COATED ORAL NIGHTLY
Qty: 90 TABLET | Refills: 2 | Status: SHIPPED | OUTPATIENT
Start: 2020-08-21 | End: 2021-06-18

## 2020-08-21 RX ORDER — PEN NEEDLE, DIABETIC 31 GX5/16"
NEEDLE, DISPOSABLE MISCELLANEOUS
COMMUNITY
Start: 2020-08-20 | End: 2021-05-28

## 2020-08-21 NOTE — PROGRESS NOTES
Virtual Telephone Check-In    Chick Diana verbally consents to a Virtual/Telephone Check-In visit on 08/21/20. Patient has been referred to the [facility] website at [email or web address] to review the yearly Consent to Treat document.     Patient under

## 2020-09-05 ENCOUNTER — TELEPHONE (OUTPATIENT)
Dept: GASTROENTEROLOGY | Facility: CLINIC | Age: 65
End: 2020-09-05

## 2020-09-05 ENCOUNTER — APPOINTMENT (OUTPATIENT)
Dept: LAB | Age: 65
End: 2020-09-05
Attending: INTERNAL MEDICINE
Payer: MEDICARE

## 2020-09-05 DIAGNOSIS — Z01.818 PRE-OP TESTING: ICD-10-CM

## 2020-09-05 NOTE — TELEPHONE ENCOUNTER
The patient has a colonoscopy scheduled with Dr. Macy Birmingham on Tuesday. He was uncertain of the preparation but he went to the pharmacy and discussed with the pharmacist.  The pharmacist explained the preparation to him.   He recited the instructions to me reg

## 2020-09-06 LAB — SARS-COV-2 RNA RESP QL NAA+PROBE: NOT DETECTED

## 2020-09-08 ENCOUNTER — ANESTHESIA (OUTPATIENT)
Dept: ENDOSCOPY | Facility: HOSPITAL | Age: 65
End: 2020-09-08
Payer: MEDICARE

## 2020-09-08 ENCOUNTER — HOSPITAL ENCOUNTER (OUTPATIENT)
Facility: HOSPITAL | Age: 65
Setting detail: HOSPITAL OUTPATIENT SURGERY
Discharge: HOME | End: 2020-09-08
Attending: INTERNAL MEDICINE | Admitting: INTERNAL MEDICINE
Payer: MEDICARE

## 2020-09-08 ENCOUNTER — ANESTHESIA EVENT (OUTPATIENT)
Dept: ENDOSCOPY | Facility: HOSPITAL | Age: 65
End: 2020-09-08
Payer: MEDICARE

## 2020-09-08 VITALS
HEART RATE: 70 BPM | HEIGHT: 66 IN | OXYGEN SATURATION: 95 % | BODY MASS INDEX: 36.96 KG/M2 | TEMPERATURE: 98 F | WEIGHT: 230 LBS | RESPIRATION RATE: 16 BRPM | DIASTOLIC BLOOD PRESSURE: 69 MMHG | SYSTOLIC BLOOD PRESSURE: 113 MMHG

## 2020-09-08 DIAGNOSIS — K62.5 RECTAL BLEEDING: ICD-10-CM

## 2020-09-08 DIAGNOSIS — Z01.818 PRE-OP TESTING: Primary | ICD-10-CM

## 2020-09-08 LAB — GLUCOSE BLDC GLUCOMTR-MCNC: 173 MG/DL (ref 70–99)

## 2020-09-08 PROCEDURE — G0121 COLON CA SCRN NOT HI RSK IND: HCPCS | Performed by: INTERNAL MEDICINE

## 2020-09-08 PROCEDURE — 0DJD8ZZ INSPECTION OF LOWER INTESTINAL TRACT, VIA NATURAL OR ARTIFICIAL OPENING ENDOSCOPIC: ICD-10-PCS | Performed by: INTERNAL MEDICINE

## 2020-09-08 RX ORDER — LIDOCAINE HYDROCHLORIDE 20 MG/ML
INJECTION, SOLUTION EPIDURAL; INFILTRATION; INTRACAUDAL; PERINEURAL AS NEEDED
Status: DISCONTINUED | OUTPATIENT
Start: 2020-09-08 | End: 2020-09-08 | Stop reason: SURG

## 2020-09-08 RX ORDER — SODIUM CHLORIDE, SODIUM LACTATE, POTASSIUM CHLORIDE, CALCIUM CHLORIDE 600; 310; 30; 20 MG/100ML; MG/100ML; MG/100ML; MG/100ML
INJECTION, SOLUTION INTRAVENOUS CONTINUOUS
Status: DISCONTINUED | OUTPATIENT
Start: 2020-09-08 | End: 2020-09-08

## 2020-09-08 RX ADMIN — LIDOCAINE HYDROCHLORIDE 40 MG: 20 INJECTION, SOLUTION EPIDURAL; INFILTRATION; INTRACAUDAL; PERINEURAL at 11:16:00

## 2020-09-08 RX ADMIN — SODIUM CHLORIDE, SODIUM LACTATE, POTASSIUM CHLORIDE, CALCIUM CHLORIDE: 600; 310; 30; 20 INJECTION, SOLUTION INTRAVENOUS at 12:10:00

## 2020-09-08 NOTE — ANESTHESIA PREPROCEDURE EVALUATION
Anesthesia PreOp Note    HPI:     Francella Peabody is a 59year old male who presents for preoperative consultation requested by: Joe Spence MD    Date of Surgery: 9/8/2020    Procedure(s):  COLONOSCOPY  Indication: Rectal bleeding    Relev Subcutaneous Solution Pen-injector, Inject 12 Units into the skin daily. , Disp: 6 pen, Rfl: 2, 9/7/2020  DICLOFENAC SODIUM 50 MG Oral Tab EC, TAKE 1 TABLET BY MOUTH TWICE DAILY, Disp: 180 tablet, Rfl: 0, 9/5/2020  DULoxetine HCl 30 MG Oral Cap DR Particles Years of education: Not on file      Highest education level: Not on file    Occupational History      Not on file    Social Needs      Financial resource strain: Not on file      Food insecurity:        Worry: Not on file        Inability: Not on file stairs. Available pre-op labs reviewed.      Lab Results   Component Value Date     08/19/2020    K 4.2 08/19/2020     08/19/2020    CO2 26.0 08/19/2020    BUN 18 08/19/2020    CREATSERUM 0.83 08/19/2020     (H) 08/19/2020    CA 8.9

## 2020-09-08 NOTE — ANESTHESIA POSTPROCEDURE EVALUATION
Patient: Leola Zendejas    Procedure Summary     Date:  09/08/20 Room / Location:  [address] [place] [address] ENDOSCOPY 05 / [address] [place] [address] ENDOSCOPY    Anesthesia Start:  3137 Anesthesia Stop:      Procedure:  COLONOSCOPY (N/A ) Diagnosis:       Rectal bleeding      (diverticulosis hem

## 2020-09-08 NOTE — OPERATIVE REPORT
COLONOSCOPY PROCEDURE REPORT     DATE OF PROCEDURE:  9/8/2020     PCP: Radha Shepard MD     PREOPERATIVE DIAGNOSIS: Screening colonoscopy examination, rectal bleeding     POSTOPERATIVE DIAGNOSIS:  See impression. SURGEON:  MANDO Smallwood

## 2020-09-08 NOTE — H&P
History & Physical Examination    Patient Name: Sam Turner  MRN: [id number]  [facility]: [id number]  YOB: 1955    Diagnosis: Screening colonoscopy examination    Present Illness: 19-year-old gentleman with history of hypertension, diabetes a Misc, 1 each by Does not apply route daily. , Disp: 100 each, Rfl: 1, Taking  Hydrocortisone 2.5 % External Cream, Apply rectally bid, Disp: 28 g, Rfl: 0, Taking  Glucose Blood (ACCU-CHEK DAXA) In Vitro Strip, Check glucose two times a day, Disp: 100 strip care.  [ x ] I have reviewed the History and Physical done within the last 30 days. Any changes noted above.     Lulu Page Listen  9/8/2020  10:58 AM

## 2020-09-14 ENCOUNTER — OFFICE VISIT (OUTPATIENT)
Dept: FAMILY MEDICINE CLINIC | Facility: CLINIC | Age: 65
End: 2020-09-14
Payer: MEDICARE

## 2020-09-14 VITALS
HEIGHT: 66 IN | WEIGHT: 234 LBS | SYSTOLIC BLOOD PRESSURE: 112 MMHG | BODY MASS INDEX: 37.61 KG/M2 | TEMPERATURE: 98 F | DIASTOLIC BLOOD PRESSURE: 75 MMHG | HEART RATE: 72 BPM

## 2020-09-14 DIAGNOSIS — Z79.4 TYPE 2 DIABETES MELLITUS WITHOUT COMPLICATION, WITH LONG-TERM CURRENT USE OF INSULIN (HCC): Primary | ICD-10-CM

## 2020-09-14 DIAGNOSIS — E11.9 TYPE 2 DIABETES MELLITUS WITHOUT COMPLICATION, WITH LONG-TERM CURRENT USE OF INSULIN (HCC): Primary | ICD-10-CM

## 2020-09-14 LAB
GLUCOSE BLOOD: 135
TEST STRIP LOT #: NORMAL NUMERIC

## 2020-09-14 PROCEDURE — 3078F DIAST BP <80 MM HG: CPT | Performed by: FAMILY MEDICINE

## 2020-09-14 PROCEDURE — 90686 IIV4 VACC NO PRSV 0.5 ML IM: CPT | Performed by: FAMILY MEDICINE

## 2020-09-14 PROCEDURE — G0008 ADMIN INFLUENZA VIRUS VAC: HCPCS | Performed by: FAMILY MEDICINE

## 2020-09-14 PROCEDURE — 3074F SYST BP LT 130 MM HG: CPT | Performed by: FAMILY MEDICINE

## 2020-09-14 PROCEDURE — 36416 COLLJ CAPILLARY BLOOD SPEC: CPT | Performed by: FAMILY MEDICINE

## 2020-09-14 PROCEDURE — 99213 OFFICE O/P EST LOW 20 MIN: CPT | Performed by: FAMILY MEDICINE

## 2020-09-14 PROCEDURE — 3008F BODY MASS INDEX DOCD: CPT | Performed by: FAMILY MEDICINE

## 2020-09-14 PROCEDURE — 82962 GLUCOSE BLOOD TEST: CPT | Performed by: FAMILY MEDICINE

## 2020-09-14 RX ORDER — OMEPRAZOLE 40 MG/1
40 CAPSULE, DELAYED RELEASE ORAL DAILY
Qty: 30 CAPSULE | Refills: 3 | Status: SHIPPED | OUTPATIENT
Start: 2020-09-14 | End: 2021-09-09

## 2020-09-14 RX ORDER — TAMSULOSIN HYDROCHLORIDE 0.4 MG/1
CAPSULE ORAL
Qty: 90 CAPSULE | Refills: 2 | Status: SHIPPED | OUTPATIENT
Start: 2020-09-14 | End: 2021-06-01

## 2020-09-14 RX ORDER — DULOXETIN HYDROCHLORIDE 30 MG/1
60 CAPSULE, DELAYED RELEASE ORAL DAILY
Qty: 180 CAPSULE | Refills: 0 | Status: SHIPPED | OUTPATIENT
Start: 2020-09-14 | End: 2020-12-07

## 2020-09-14 NOTE — PROGRESS NOTES
9/14/2020  11:38 AM    Leola Zendejas is a 59year old male. Chief complaint(s): Patient presents with:  Diabetes: f/u    HPI:     Leola Zendejas primary complaint is regarding diabetes.      Aleshia Gusamn a 59year old male is here [birth year]/10/1993        Years since quittin.2      Smokeless tobacco: Never Used    Alcohol use: Yes      Frequency: Monthly or less    Drug use: No       Immunizations:     Immunization History  Administered            Date(s) Administered    HEP B Alcohol Swabs (ALCOHOL PADS) 70 % Does not apply Pads For use prior to checking sugar or giving insulin     • Omega-3-acid Ethyl Esters 1 g Oral Cap 1 g daily. • aspirin EC 81 MG Oral Tab EC Take 1 tablet (81 mg total) by mouth daily.  100 tablet 2 Blood test(s) ordered today ; Orders Placed This Encounter      POC Finger stick glucose [73050]    Additional orders include: Flu vaccine given today  MEDICATIONS:  •  DULoxetine HCl 30 MG Oral Cap DR Particles, Take 2 capsules (60 mg total) by mouth kain Oral Cap, 1 g daily. , Disp: , Rfl:   •  aspirin EC 81 MG Oral Tab EC, Take 1 tablet (81 mg total) by mouth daily. , Disp: 100 tablet, Rfl: 2  •  ONETOUCH DELICA LANCETS FINE Does not apply Misc, TEST BID UTD, Disp: , Rfl: 5  •  Glucose Blood (ACCU-CHEK AV glucose [60037]      Meds This Visit:  Requested Prescriptions     Signed Prescriptions Disp Refills   • DULoxetine HCl 30 MG Oral Cap DR Particles 180 capsule 0     Sig: Take 2 capsules (60 mg total) by mouth daily.    • tamsulosin (FLOMAX) cap 90 capsule

## 2020-09-15 ENCOUNTER — OFFICE VISIT (OUTPATIENT)
Dept: NEUROLOGY | Facility: CLINIC | Age: 65
End: 2020-09-15
Payer: MEDICARE

## 2020-09-15 VITALS — HEIGHT: 66 IN | BODY MASS INDEX: 37.61 KG/M2 | WEIGHT: 234 LBS

## 2020-09-15 DIAGNOSIS — M54.12 CERVICAL RADICULITIS: Primary | ICD-10-CM

## 2020-09-15 DIAGNOSIS — E11.65 UNCONTROLLED TYPE 2 DIABETES MELLITUS WITH HYPERGLYCEMIA (HCC): ICD-10-CM

## 2020-09-15 PROCEDURE — 99214 OFFICE O/P EST MOD 30 MIN: CPT | Performed by: PHYSICAL MEDICINE & REHABILITATION

## 2020-09-15 PROCEDURE — 3008F BODY MASS INDEX DOCD: CPT | Performed by: PHYSICAL MEDICINE & REHABILITATION

## 2020-09-15 RX ORDER — TIZANIDINE 2 MG/1
2 TABLET ORAL NIGHTLY PRN
Qty: 20 TABLET | Refills: 0 | Status: SHIPPED | OUTPATIENT
Start: 2020-09-15 | End: 2021-12-08

## 2020-09-15 NOTE — PROGRESS NOTES
HPI:    Patient ID: Keaton Ritter is a 59year old male. 63-year-old male presents for follow-up. Complains of a new issue to me today.   He has been having midline mid to lower neck pain with radiation to both upper extremities, left worse than rig atorvastatin 40 MG Oral Tab Take 1 tablet (40 mg total) by mouth nightly. 90 tablet 2   • Insulin Syringe-Needle U-100 32G X 5/16\" 1 ML Does not apply Misc 1 each by Does not apply route daily.  450 each 1   • TRULICITY 1.5 ZF/5.2HS Subcutaneous Solution P degrees with cervical extension. 50 degrees with cervical flexion. 75 degrees with right cervical rotation. 75 degrees with left cervical rotation. Palpation: no ttp in the upper trapezius bilaterally. No ttp in the cervical paraspinals bilaterally.

## 2020-09-16 RX ORDER — BLOOD SUGAR DIAGNOSTIC
STRIP MISCELLANEOUS
Qty: 100 STRIP | Refills: 0 | Status: SHIPPED | OUTPATIENT
Start: 2020-09-16 | End: 2020-11-14

## 2020-09-16 RX ORDER — LANCETS
EACH MISCELLANEOUS
Qty: 102 EACH | Refills: 0 | Status: SHIPPED | OUTPATIENT
Start: 2020-09-16 | End: 2020-11-13

## 2020-09-17 ENCOUNTER — OFFICE VISIT (OUTPATIENT)
Dept: PHYSICAL THERAPY | Age: 65
End: 2020-09-17
Attending: PHYSICAL MEDICINE & REHABILITATION
Payer: MEDICARE

## 2020-09-17 DIAGNOSIS — M54.12 CERVICAL RADICULITIS: ICD-10-CM

## 2020-09-17 PROCEDURE — 97110 THERAPEUTIC EXERCISES: CPT | Performed by: PHYSICAL THERAPIST

## 2020-09-17 PROCEDURE — 97140 MANUAL THERAPY 1/> REGIONS: CPT | Performed by: PHYSICAL THERAPIST

## 2020-09-17 PROCEDURE — 97161 PT EVAL LOW COMPLEX 20 MIN: CPT | Performed by: PHYSICAL THERAPIST

## 2020-09-17 NOTE — PROGRESS NOTES
P.T. EVALUATION:   Referring Physician: Dr. Shobha Ruffin  Diagnosis: Cervical radiculitis (M54.12)    Date of Onset: 9/152020 Date of Service: 9/17/2020     PATIENT SUMMARY   Patient verbalized consent for Physical Therapy treatment.   Otilia Fuentes is a 59 oriented x 3. Ambulatory into department. Palpation: (-) tenderness around neck area    ROM: Cervical ROM: minimally limited into all planes. Pain increases with repeated motion into extension, lateral flexion, and rotation.  No change with repeated flex home program.    Education or treatment limitation: None    Rehab Potential:good    Patient/Family/Caregiver was advised of these findings, precautions, and treatment options and has agreed to actively participate in planning and for this course of care.

## 2020-09-22 ENCOUNTER — OFFICE VISIT (OUTPATIENT)
Dept: PHYSICAL THERAPY | Age: 65
End: 2020-09-22
Attending: PHYSICAL MEDICINE & REHABILITATION
Payer: MEDICARE

## 2020-09-22 DIAGNOSIS — M54.12 CERVICAL RADICULITIS: ICD-10-CM

## 2020-09-22 PROCEDURE — 97110 THERAPEUTIC EXERCISES: CPT | Performed by: PHYSICAL THERAPIST

## 2020-09-22 PROCEDURE — 97140 MANUAL THERAPY 1/> REGIONS: CPT | Performed by: PHYSICAL THERAPIST

## 2020-09-22 NOTE — PROGRESS NOTES
Diagnosis: Cervical radiculitis (M54.12)         Next MD visit: none scheduled  Fall Risk: standard         Precautions: n/a          Medication Changes since last visit?: No      Subjective: Still complains of neck pain radiating to L UE.  States he has

## 2020-09-24 ENCOUNTER — OFFICE VISIT (OUTPATIENT)
Dept: PHYSICAL THERAPY | Age: 65
End: 2020-09-24
Attending: PHYSICAL MEDICINE & REHABILITATION
Payer: MEDICARE

## 2020-09-24 PROCEDURE — 97110 THERAPEUTIC EXERCISES: CPT | Performed by: PHYSICAL THERAPIST

## 2020-09-24 PROCEDURE — 97140 MANUAL THERAPY 1/> REGIONS: CPT | Performed by: PHYSICAL THERAPIST

## 2020-09-24 NOTE — PROGRESS NOTES
Diagnosis: Cervical radiculitis (M54.12)         Next MD visit: none scheduled  Fall Risk: standard         Precautions: n/a          Medication Changes since last visit?: No      Subjective: Still complains of neck pain radiating to L UE.  States he has and management of residual symptoms. 2. Patient will report neck pain of not more than 1/10 during active motion. 3. Restore full and painfree neck and B shoulder ROM to improve mobility.   4. Improve UE strength to 5/5 throughout to be able to perform pr

## 2020-10-06 ENCOUNTER — OFFICE VISIT (OUTPATIENT)
Dept: PHYSICAL THERAPY | Age: 65
End: 2020-10-06
Attending: PHYSICAL MEDICINE & REHABILITATION
Payer: MEDICARE

## 2020-10-06 PROCEDURE — 97110 THERAPEUTIC EXERCISES: CPT | Performed by: PHYSICAL THERAPIST

## 2020-10-06 PROCEDURE — 97140 MANUAL THERAPY 1/> REGIONS: CPT | Performed by: PHYSICAL THERAPIST

## 2020-10-06 NOTE — PROGRESS NOTES
Diagnosis: Cervical radiculitis (M54.12)         Next MD visit: none scheduled  Fall Risk: standard         Precautions: n/a          Medication Changes since last visit?: No      Subjective: Still complains of neck pain radiating to L UE.  States he has distraction  - seated neck extension with sustained lower cervical glides 15min  - supine cervical mobilization into rotation, lateral flexion, and extension all with distraction  - seated neck extension with sustained lower cervical glides 15min  - supine

## 2020-10-08 ENCOUNTER — OFFICE VISIT (OUTPATIENT)
Dept: PHYSICAL THERAPY | Age: 65
End: 2020-10-08
Attending: PHYSICAL MEDICINE & REHABILITATION
Payer: MEDICARE

## 2020-10-08 PROCEDURE — 97140 MANUAL THERAPY 1/> REGIONS: CPT | Performed by: PHYSICAL THERAPIST

## 2020-10-08 PROCEDURE — 97110 THERAPEUTIC EXERCISES: CPT | Performed by: PHYSICAL THERAPIST

## 2020-10-08 NOTE — PROGRESS NOTES
Diagnosis: Cervical radiculitis (M54.12)         Next MD visit: none scheduled  Fall Risk: standard         Precautions: n/a          Medication Changes since last visit?: No      Subjective: Still complains of neck pain radiating to L UE.  States he has into rotation, lateral flexion, and extension all with distraction  - seated neck extension with sustained lower cervical glides   15min  - supine cervical mobilization into rotation, lateral flexion, and extension all with distraction  - seated neck exten

## 2020-10-13 ENCOUNTER — HOSPITAL ENCOUNTER (OUTPATIENT)
Dept: GENERAL RADIOLOGY | Age: 65
Discharge: HOME | End: 2020-10-13
Attending: PHYSICAL MEDICINE & REHABILITATION
Payer: MEDICARE

## 2020-10-13 ENCOUNTER — OFFICE VISIT (OUTPATIENT)
Dept: PHYSICAL THERAPY | Age: 65
End: 2020-10-13
Attending: PHYSICAL MEDICINE & REHABILITATION
Payer: MEDICARE

## 2020-10-13 ENCOUNTER — TELEPHONE (OUTPATIENT)
Dept: NEUROLOGY | Facility: CLINIC | Age: 65
End: 2020-10-13

## 2020-10-13 DIAGNOSIS — M54.16 LEFT LUMBAR RADICULITIS: ICD-10-CM

## 2020-10-13 PROCEDURE — 97110 THERAPEUTIC EXERCISES: CPT | Performed by: PHYSICAL THERAPIST

## 2020-10-13 PROCEDURE — 97140 MANUAL THERAPY 1/> REGIONS: CPT | Performed by: PHYSICAL THERAPIST

## 2020-10-13 PROCEDURE — 72110 X-RAY EXAM L-2 SPINE 4/>VWS: CPT | Performed by: PHYSICAL MEDICINE & REHABILITATION

## 2020-10-13 NOTE — PROGRESS NOTES
Diagnosis: Cervical radiculitis (M54.12)         Next MD visit: none scheduled  Fall Risk: standard         Precautions: n/a          Medication Changes since last visit?: No      Subjective: States his neck is feeling better.  Complains of residual L tommy PT   15min  - supine cervical mobilization into rotation, lateral flexion  with distraction  - seated neck extension with sustained lower cervical glides   15min  - supine cervical mobilization into rotation, lateral flexion, and extension all with distrac

## 2020-10-13 NOTE — TELEPHONE ENCOUNTER
----- Message from Bartolo Serna DO sent at 10/13/2020  2:04 PM CDT -----  Please let the patient know the back is stable - there is no abnormal movement in the spine when he bends forwards and backwards.

## 2020-10-13 NOTE — TELEPHONE ENCOUNTER
Spoke to patient and notified him of below. He was understanding. He is leaving for [facility] on Sunday and would like to get in to see Dr. Liss Butt.  Scheduled appointment on 10/15 at 2:30 pm.

## 2020-10-15 ENCOUNTER — OFFICE VISIT (OUTPATIENT)
Dept: NEUROLOGY | Facility: CLINIC | Age: 65
End: 2020-10-15
Payer: MEDICARE

## 2020-10-15 ENCOUNTER — OFFICE VISIT (OUTPATIENT)
Dept: PHYSICAL THERAPY | Age: 65
End: 2020-10-15
Attending: PHYSICAL MEDICINE & REHABILITATION
Payer: MEDICARE

## 2020-10-15 VITALS — HEIGHT: 66 IN | BODY MASS INDEX: 37.61 KG/M2 | WEIGHT: 234 LBS

## 2020-10-15 DIAGNOSIS — M43.16 SPONDYLOLISTHESIS OF LUMBAR REGION: ICD-10-CM

## 2020-10-15 DIAGNOSIS — M54.32 BILATERAL SCIATICA: ICD-10-CM

## 2020-10-15 DIAGNOSIS — M54.31 BILATERAL SCIATICA: ICD-10-CM

## 2020-10-15 DIAGNOSIS — M48.061 LUMBAR FORAMINAL STENOSIS: Primary | ICD-10-CM

## 2020-10-15 PROCEDURE — 97110 THERAPEUTIC EXERCISES: CPT | Performed by: PHYSICAL THERAPIST

## 2020-10-15 PROCEDURE — 99214 OFFICE O/P EST MOD 30 MIN: CPT | Performed by: PHYSICAL MEDICINE & REHABILITATION

## 2020-10-15 PROCEDURE — 3008F BODY MASS INDEX DOCD: CPT | Performed by: PHYSICAL MEDICINE & REHABILITATION

## 2020-10-15 RX ORDER — DULOXETIN HYDROCHLORIDE 30 MG/1
CAPSULE, DELAYED RELEASE ORAL
Qty: 180 CAPSULE | Refills: 0 | OUTPATIENT
Start: 2020-10-15

## 2020-10-15 RX ORDER — GABAPENTIN 300 MG/1
300 CAPSULE ORAL 2 TIMES DAILY
Qty: 120 CAPSULE | Refills: 0 | Status: SHIPPED | OUTPATIENT
Start: 2020-10-15 | End: 2020-12-07

## 2020-10-15 NOTE — PATIENT INSTRUCTIONS
Start gabapentin 300mg twice daily. You may increase to 600mg twice daily if you do not have any side effects from the medication. Once you come back we can get you set up for physical therapy, epidural injection or both.     Decrease your walking time t

## 2020-10-15 NOTE — PROGRESS NOTES
HPI:    Patient ID: Freida Rivas is a 59year old male. 17-year-old male presents for follow-up.   He has been having increasing radiating aching pain from the right lower back below the waistline to the anterolateral aspect of the right shin with a not apply route daily. 030 each 1   • TRULICITY 1.5 JE/1.3RX Subcutaneous Solution Pen-injector Inject 1.5 mg into the skin once a week. 12 pen 1   • Losartan Potassium 25 MG Oral Tab Take 1 tablet (25 mg total) by mouth daily.  90 tablet 1   • Metoprolol S noted.   Nursing note and vitals reviewed. ASSESSMENT/PLAN:   Lumbar foraminal stenosis  (primary encounter diagnosis)  Spondylolisthesis of lumbar region    Recommend starting gabapentin 300mg BID; may increase to 600mg BID if no side effects.  Con

## 2020-10-15 NOTE — PROGRESS NOTES
Diagnosis: Cervical radiculitis (M54.12)         Next MD visit: none scheduled  Fall Risk: standard         Precautions: n/a          Medication Changes since last visit?: No      Subjective: States his neck is feeling better.  Complains of residual neck 2  - sidelying L / R shoulder external rotation with 3lb db 10 x 2  - B shoulder high / narrow rows with blue sport cord 10 x 2  - B shoulder extensions with blue sport cord 10 x 2  - B shoulder external rotation with green sportcord 10 x 2  - L/R shoulder carrying, and reaching activities without difficulty.           Charges: EX2 Manual PT1       Total Timed Treatment: 40 min  Total Treatment Time: 45 min      Plan: Patient may be discharged from PT at this time then follow up with MD as needed after he com

## 2020-10-27 ENCOUNTER — MED REC SCAN ONLY (OUTPATIENT)
Dept: NEUROLOGY | Facility: CLINIC | Age: 65
End: 2020-10-27

## 2020-11-13 DIAGNOSIS — E11.65 UNCONTROLLED TYPE 2 DIABETES MELLITUS WITH HYPERGLYCEMIA (HCC): ICD-10-CM

## 2020-11-13 NOTE — TELEPHONE ENCOUNTER
ACCU-CHEK DAXA PLUS STRIPS 100S    TEST TWICE DAILY    ACCCHEK FASTCLIX LANCETS 102'S    CHECK BLOOD SUGAR TWICE DAILY.

## 2020-11-14 RX ORDER — BLOOD SUGAR DIAGNOSTIC
STRIP MISCELLANEOUS
Qty: 100 STRIP | Refills: 0 | Status: SHIPPED | OUTPATIENT
Start: 2020-11-14 | End: 2021-05-29

## 2020-11-14 RX ORDER — LANCETS
1 EACH MISCELLANEOUS 2 TIMES DAILY
Qty: 200 EACH | Refills: 0 | Status: SHIPPED | OUTPATIENT
Start: 2020-11-14

## 2020-11-19 DIAGNOSIS — M48.061 LUMBAR FORAMINAL STENOSIS: ICD-10-CM

## 2020-11-19 DIAGNOSIS — M43.16 SPONDYLOLISTHESIS OF LUMBAR REGION: ICD-10-CM

## 2020-11-20 RX ORDER — GABAPENTIN 300 MG/1
CAPSULE ORAL
Qty: 120 CAPSULE | Refills: 0 | OUTPATIENT
Start: 2020-11-20

## 2020-11-20 NOTE — TELEPHONE ENCOUNTER
S/W patient he taking the gabapentin 300mg BID. He states the gabapentin is helping. He has no side effects and states is pain 4/10 after taking medication.      Patient requested medication too soon and has refill until 12/15/2020

## 2020-12-07 DIAGNOSIS — M43.16 SPONDYLOLISTHESIS OF LUMBAR REGION: ICD-10-CM

## 2020-12-07 DIAGNOSIS — M48.061 LUMBAR FORAMINAL STENOSIS: ICD-10-CM

## 2020-12-07 RX ORDER — GABAPENTIN 300 MG/1
300 CAPSULE ORAL 2 TIMES DAILY
Qty: 180 CAPSULE | Refills: 0 | Status: SHIPPED | OUTPATIENT
Start: 2020-12-07 | End: 2021-09-27

## 2020-12-07 RX ORDER — DULOXETIN HYDROCHLORIDE 30 MG/1
60 CAPSULE, DELAYED RELEASE ORAL DAILY
Qty: 180 CAPSULE | Refills: 0 | Status: SHIPPED | OUTPATIENT
Start: 2020-12-07 | End: 2021-03-15

## 2020-12-07 NOTE — TELEPHONE ENCOUNTER
Current Outpatient Medications:     •  DULoxetine HCl 30 MG Oral Cap DR Particles, Take 2 capsules (60 mg total) by mouth daily. , Disp: 180 capsule, Rfl: 0

## 2021-02-03 DIAGNOSIS — Z23 NEED FOR VACCINATION: ICD-10-CM

## 2021-03-02 DIAGNOSIS — E11.65 UNCONTROLLED TYPE 2 DIABETES MELLITUS WITH HYPERGLYCEMIA (HCC): ICD-10-CM

## 2021-03-03 RX ORDER — GLIMEPIRIDE 2 MG/1
TABLET ORAL
Qty: 30 TABLET | Refills: 0 | Status: SHIPPED | OUTPATIENT
Start: 2021-03-03 | End: 2021-06-01

## 2021-03-15 RX ORDER — DULOXETIN HYDROCHLORIDE 30 MG/1
CAPSULE, DELAYED RELEASE ORAL
Qty: 180 CAPSULE | Refills: 0 | Status: SHIPPED | OUTPATIENT
Start: 2021-03-15 | End: 2021-05-27

## 2021-04-01 ENCOUNTER — TELEPHONE (OUTPATIENT)
Dept: CASE MANAGEMENT | Age: 66
End: 2021-04-01

## 2021-04-01 NOTE — TELEPHONE ENCOUNTER
Patient is eligible for a 2021 Medicare Annual Wellness visit. Language Line  Kim Chapman #[phone] left [facility] [place] back.

## 2021-04-13 ENCOUNTER — TELEPHONE (OUTPATIENT)
Dept: CASE MANAGEMENT | Age: 66
End: 2021-04-13

## 2021-04-13 NOTE — TELEPHONE ENCOUNTER
Patient is eligible for a 2021 Medicare Annual Wellness visit. Language Line  Jean Carlos Andersen [phone] left msg to call back.

## 2021-04-27 ENCOUNTER — TELEPHONE (OUTPATIENT)
Dept: CASE MANAGEMENT | Age: 66
End: 2021-04-27

## 2021-04-27 NOTE — TELEPHONE ENCOUNTER
Patient is eligible for a 2021 Medicare Annual Wellness visit. Discussed in detail w/patient. Appt scheduled for 5/27/21.

## 2021-05-21 DIAGNOSIS — E11.65 UNCONTROLLED TYPE 2 DIABETES MELLITUS WITH HYPERGLYCEMIA (HCC): ICD-10-CM

## 2021-05-27 ENCOUNTER — OFFICE VISIT (OUTPATIENT)
Dept: NEUROLOGY | Facility: CLINIC | Age: 66
End: 2021-05-27
Payer: MEDICARE

## 2021-05-27 ENCOUNTER — OFFICE VISIT (OUTPATIENT)
Dept: FAMILY MEDICINE CLINIC | Facility: CLINIC | Age: 66
End: 2021-05-27
Payer: MEDICARE

## 2021-05-27 VITALS
SYSTOLIC BLOOD PRESSURE: 117 MMHG | HEART RATE: 65 BPM | DIASTOLIC BLOOD PRESSURE: 78 MMHG | WEIGHT: 245 LBS | BODY MASS INDEX: 33.18 KG/M2 | HEIGHT: 72 IN

## 2021-05-27 VITALS
BODY MASS INDEX: 33.18 KG/M2 | DIASTOLIC BLOOD PRESSURE: 64 MMHG | HEIGHT: 72 IN | SYSTOLIC BLOOD PRESSURE: 127 MMHG | WEIGHT: 245 LBS

## 2021-05-27 DIAGNOSIS — Z79.4 TYPE 2 DIABETES MELLITUS WITHOUT COMPLICATION, WITH LONG-TERM CURRENT USE OF INSULIN (HCC): ICD-10-CM

## 2021-05-27 DIAGNOSIS — E78.00 PURE HYPERCHOLESTEROLEMIA: ICD-10-CM

## 2021-05-27 DIAGNOSIS — M43.16 SPONDYLOLISTHESIS OF LUMBAR REGION: ICD-10-CM

## 2021-05-27 DIAGNOSIS — Z00.00 MEDICARE ANNUAL WELLNESS VISIT, SUBSEQUENT: Primary | ICD-10-CM

## 2021-05-27 DIAGNOSIS — I10 ESSENTIAL HYPERTENSION: ICD-10-CM

## 2021-05-27 DIAGNOSIS — M54.32 BILATERAL SCIATICA: ICD-10-CM

## 2021-05-27 DIAGNOSIS — N40.1 BENIGN PROSTATIC HYPERPLASIA WITH NOCTURIA: ICD-10-CM

## 2021-05-27 DIAGNOSIS — K21.9 GASTROESOPHAGEAL REFLUX DISEASE WITHOUT ESOPHAGITIS: ICD-10-CM

## 2021-05-27 DIAGNOSIS — E55.9 HYPOVITAMINOSIS D: ICD-10-CM

## 2021-05-27 DIAGNOSIS — E66.01 SEVERE OBESITY (BMI 35.0-39.9) WITH COMORBIDITY (HCC): Chronic | ICD-10-CM

## 2021-05-27 DIAGNOSIS — R35.1 BENIGN PROSTATIC HYPERPLASIA WITH NOCTURIA: ICD-10-CM

## 2021-05-27 DIAGNOSIS — G62.9 NEUROPATHY: ICD-10-CM

## 2021-05-27 DIAGNOSIS — M48.061 LUMBAR FORAMINAL STENOSIS: ICD-10-CM

## 2021-05-27 DIAGNOSIS — E11.9 TYPE 2 DIABETES MELLITUS WITHOUT COMPLICATION, WITH LONG-TERM CURRENT USE OF INSULIN (HCC): ICD-10-CM

## 2021-05-27 DIAGNOSIS — R35.1 NOCTURIA: ICD-10-CM

## 2021-05-27 DIAGNOSIS — M54.31 BILATERAL SCIATICA: ICD-10-CM

## 2021-05-27 DIAGNOSIS — M48.061 LUMBAR FORAMINAL STENOSIS: Primary | ICD-10-CM

## 2021-05-27 PROCEDURE — 3008F BODY MASS INDEX DOCD: CPT | Performed by: FAMILY MEDICINE

## 2021-05-27 PROCEDURE — 3008F BODY MASS INDEX DOCD: CPT | Performed by: PHYSICAL MEDICINE & REHABILITATION

## 2021-05-27 PROCEDURE — 96160 PT-FOCUSED HLTH RISK ASSMT: CPT | Performed by: FAMILY MEDICINE

## 2021-05-27 PROCEDURE — 3074F SYST BP LT 130 MM HG: CPT | Performed by: PHYSICAL MEDICINE & REHABILITATION

## 2021-05-27 PROCEDURE — 99397 PER PM REEVAL EST PAT 65+ YR: CPT | Performed by: FAMILY MEDICINE

## 2021-05-27 PROCEDURE — 99214 OFFICE O/P EST MOD 30 MIN: CPT | Performed by: PHYSICAL MEDICINE & REHABILITATION

## 2021-05-27 PROCEDURE — G0439 PPPS, SUBSEQ VISIT: HCPCS | Performed by: FAMILY MEDICINE

## 2021-05-27 PROCEDURE — 3074F SYST BP LT 130 MM HG: CPT | Performed by: FAMILY MEDICINE

## 2021-05-27 PROCEDURE — 3078F DIAST BP <80 MM HG: CPT | Performed by: PHYSICAL MEDICINE & REHABILITATION

## 2021-05-27 PROCEDURE — 3078F DIAST BP <80 MM HG: CPT | Performed by: FAMILY MEDICINE

## 2021-05-27 RX ORDER — TELMISARTAN AND HYDROCHLORTHIAZIDE 80; 12.5 MG/1; MG/1
1 TABLET ORAL DAILY
COMMUNITY
End: 2021-05-27

## 2021-05-27 RX ORDER — AMITRIPTYLINE HYDROCHLORIDE 25 MG/1
TABLET, FILM COATED ORAL
Qty: 30 TABLET | Refills: 0 | Status: SHIPPED | OUTPATIENT
Start: 2021-05-27 | End: 2021-06-10

## 2021-05-27 RX ORDER — INSULIN GLARGINE 100 [IU]/ML
20 INJECTION, SOLUTION SUBCUTANEOUS DAILY
Qty: 5 EACH | Refills: 3 | Status: SHIPPED | OUTPATIENT
Start: 2021-05-27 | End: 2021-06-07

## 2021-05-27 RX ORDER — DULAGLUTIDE 1.5 MG/.5ML
1.5 INJECTION, SOLUTION SUBCUTANEOUS WEEKLY
Qty: 4 EACH | Refills: 3 | Status: SHIPPED | OUTPATIENT
Start: 2021-05-27 | End: 2021-06-22

## 2021-05-27 NOTE — PROGRESS NOTES
Progress note    C/C:low back and leg pain    HPI: 71 yo male presents for f/u. Speaks primarily [address], spoken to with the use of a phone . Bilateral leg pain that worsens with walking or lifting objects.  Sometimes he has pain in both lower le

## 2021-05-27 NOTE — PROGRESS NOTES
HPI:   Abhay Mills is a 72year old male who presents for a Medicare Subsequent Annual Wellness visit (Pt already had Initial Annual Wellness). Abhay Mills is a 72year old male is here for routine periodic health screening and examination. Use      Smoking status: Former Smoker        Years: 20.00        Quit date: 6/10/1993        Years since quittin.9      Smokeless tobacco: Never Used         CAGE Alcohol Screen Please Preform Now    Cage NOT Performed in the last 6 months, please do reports current alcohol use. He reports that he does not use drugs. REVIEW OF SYSTEMS:   Review of Systems   Constitutional: Positive for unexpected weight change (gain). Negative for appetite change, fatigue and fever.    HENT: Negative for hearing los I have trouble understanding the speaker in a large room such as at a meeting or place of Oriental orthodox: Sometimes   Many people I talk to seem to mumble (or don't speak clearly): No People get annoyed because I misunderstand what they say: No   I misunderstand supple. Comments: Spinal exam without scoliosis. Lymphadenopathy:      Cervical: No cervical adenopathy. Skin:     General: Skin is warm. Findings: No rash. Neurological:      General: No focal deficit present.       Mental Status: He is include: safety (i.e. seat belts, helmets, sunscreen, protective sports gear ), nutrition (i.e. healthy meals and snacks (i.e. avoid junk food and high-carbohydrate foods); athletic conditioning, fluids; low fat milk, limit to less than 20 oz. a day; denta with comorbidity ([facility] [address]  Weight loss plan    12. Hypovitaminosis D  Lab:  VITAMIN D, 25-HYDROXY    13. Risk of Fall is moderate, precautions given. Continue with current treatment plan. Reinforced healthy diet, lifestyle, and exercise.     No follow-up Risk 11/07/2015 Medium/high risk factors:   End-stage renal disease   Hemophiliacs who received Factor VIII or IX concentrates   Clients of institutions for the mentally retarded   Persons who live in the same house as a HepB virus carrier   Homosexual men

## 2021-05-28 ENCOUNTER — LAB ENCOUNTER (OUTPATIENT)
Dept: LAB | Age: 66
End: 2021-05-28
Attending: FAMILY MEDICINE
Payer: MEDICARE

## 2021-05-28 ENCOUNTER — EKG ENCOUNTER (OUTPATIENT)
Dept: LAB | Age: 66
End: 2021-05-28
Attending: FAMILY MEDICINE
Payer: MEDICARE

## 2021-05-28 DIAGNOSIS — I10 ESSENTIAL HYPERTENSION: ICD-10-CM

## 2021-05-28 DIAGNOSIS — E11.9 TYPE 2 DIABETES MELLITUS WITHOUT COMPLICATION, WITH LONG-TERM CURRENT USE OF INSULIN (HCC): ICD-10-CM

## 2021-05-28 DIAGNOSIS — Z79.4 TYPE 2 DIABETES MELLITUS WITHOUT COMPLICATION, WITH LONG-TERM CURRENT USE OF INSULIN (HCC): ICD-10-CM

## 2021-05-28 DIAGNOSIS — E11.65 UNCONTROLLED TYPE 2 DIABETES MELLITUS WITH HYPERGLYCEMIA (HCC): ICD-10-CM

## 2021-05-28 DIAGNOSIS — E78.00 PURE HYPERCHOLESTEROLEMIA: ICD-10-CM

## 2021-05-28 PROCEDURE — 81015 MICROSCOPIC EXAM OF URINE: CPT | Performed by: FAMILY MEDICINE

## 2021-05-28 PROCEDURE — 83036 HEMOGLOBIN GLYCOSYLATED A1C: CPT

## 2021-05-28 PROCEDURE — 36415 COLL VENOUS BLD VENIPUNCTURE: CPT

## 2021-05-28 PROCEDURE — 80061 LIPID PANEL: CPT

## 2021-05-28 PROCEDURE — 3052F HG A1C>EQUAL 8.0%<EQUAL 9.0%: CPT | Performed by: FAMILY MEDICINE

## 2021-05-28 PROCEDURE — 81001 URINALYSIS AUTO W/SCOPE: CPT | Performed by: FAMILY MEDICINE

## 2021-05-28 PROCEDURE — 80053 COMPREHEN METABOLIC PANEL: CPT

## 2021-05-28 PROCEDURE — 93010 ELECTROCARDIOGRAM REPORT: CPT | Performed by: FAMILY MEDICINE

## 2021-05-28 PROCEDURE — 82306 VITAMIN D 25 HYDROXY: CPT | Performed by: FAMILY MEDICINE

## 2021-05-28 PROCEDURE — 84153 ASSAY OF PSA TOTAL: CPT | Performed by: FAMILY MEDICINE

## 2021-05-28 PROCEDURE — 93005 ELECTROCARDIOGRAM TRACING: CPT

## 2021-05-28 PROCEDURE — 84443 ASSAY THYROID STIM HORMONE: CPT

## 2021-05-28 PROCEDURE — 85025 COMPLETE CBC W/AUTO DIFF WBC: CPT

## 2021-05-28 RX ORDER — PEN NEEDLE, DIABETIC 31 GX5/16"
NEEDLE, DISPOSABLE MISCELLANEOUS
Qty: 100 EACH | Refills: 0 | Status: SHIPPED | OUTPATIENT
Start: 2021-05-28 | End: 2021-06-22

## 2021-05-28 NOTE — TELEPHONE ENCOUNTER
Pt is calling for status of his refill request. Pt states that he is out of the insuline needles. Pt can be reached at [phone].

## 2021-05-29 RX ORDER — BLOOD SUGAR DIAGNOSTIC
STRIP MISCELLANEOUS
Qty: 100 STRIP | Refills: 0 | Status: SHIPPED | OUTPATIENT
Start: 2021-05-29

## 2021-05-31 DIAGNOSIS — E11.65 UNCONTROLLED TYPE 2 DIABETES MELLITUS WITH HYPERGLYCEMIA (HCC): ICD-10-CM

## 2021-06-01 DIAGNOSIS — E11.65 UNCONTROLLED TYPE 2 DIABETES MELLITUS WITH HYPERGLYCEMIA (HCC): ICD-10-CM

## 2021-06-01 RX ORDER — ERGOCALCIFEROL 1.25 MG/1
50000 CAPSULE ORAL WEEKLY
Qty: 12 CAPSULE | Refills: 4 | Status: SHIPPED | OUTPATIENT
Start: 2021-06-01 | End: 2021-07-01

## 2021-06-01 RX ORDER — TAMSULOSIN HYDROCHLORIDE 0.4 MG/1
CAPSULE ORAL
Qty: 90 CAPSULE | Refills: 2 | Status: SHIPPED | OUTPATIENT
Start: 2021-06-01 | End: 2021-06-18

## 2021-06-01 RX ORDER — GLIMEPIRIDE 2 MG/1
TABLET ORAL
Qty: 90 TABLET | Refills: 0 | OUTPATIENT
Start: 2021-06-01

## 2021-06-01 RX ORDER — GLIMEPIRIDE 2 MG/1
TABLET ORAL
Qty: 30 TABLET | Refills: 0 | Status: SHIPPED | OUTPATIENT
Start: 2021-06-01 | End: 2021-06-18

## 2021-06-01 RX ORDER — EMPAGLIFLOZIN, METFORMIN HYDROCHLORIDE 25; 1000 MG/1; MG/1
1 TABLET, EXTENDED RELEASE ORAL DAILY
Qty: 90 TABLET | Refills: 1 | Status: SHIPPED | OUTPATIENT
Start: 2021-06-01 | End: 2021-06-18

## 2021-06-07 ENCOUNTER — OFFICE VISIT (OUTPATIENT)
Dept: FAMILY MEDICINE CLINIC | Facility: CLINIC | Age: 66
End: 2021-06-07
Payer: MEDICARE

## 2021-06-07 VITALS
HEIGHT: 66 IN | WEIGHT: 247.38 LBS | SYSTOLIC BLOOD PRESSURE: 122 MMHG | HEART RATE: 78 BPM | DIASTOLIC BLOOD PRESSURE: 76 MMHG | BODY MASS INDEX: 39.76 KG/M2 | TEMPERATURE: 97 F

## 2021-06-07 DIAGNOSIS — E11.65 UNCONTROLLED TYPE 2 DIABETES MELLITUS WITH HYPERGLYCEMIA (HCC): Primary | ICD-10-CM

## 2021-06-07 PROCEDURE — 99213 OFFICE O/P EST LOW 20 MIN: CPT | Performed by: FAMILY MEDICINE

## 2021-06-07 PROCEDURE — 3008F BODY MASS INDEX DOCD: CPT | Performed by: FAMILY MEDICINE

## 2021-06-07 PROCEDURE — 3074F SYST BP LT 130 MM HG: CPT | Performed by: FAMILY MEDICINE

## 2021-06-07 PROCEDURE — 3078F DIAST BP <80 MM HG: CPT | Performed by: FAMILY MEDICINE

## 2021-06-07 RX ORDER — INSULIN ASPART 100 [IU]/ML
3 INJECTION, SOLUTION INTRAVENOUS; SUBCUTANEOUS
Qty: 5 EACH | Refills: 3 | Status: SHIPPED | OUTPATIENT
Start: 2021-06-07 | End: 2021-06-22

## 2021-06-07 RX ORDER — KETOCONAZOLE 20 MG/G
CREAM TOPICAL
Qty: 60 G | Refills: 0 | Status: SHIPPED | OUTPATIENT
Start: 2021-06-07 | End: 2021-06-18

## 2021-06-07 RX ORDER — INSULIN GLARGINE 100 [IU]/ML
20 INJECTION, SOLUTION SUBCUTANEOUS DAILY
Qty: 5 EACH | Refills: 3 | Status: SHIPPED | OUTPATIENT
Start: 2021-06-07 | End: 2021-06-22

## 2021-06-07 NOTE — PROGRESS NOTES
6/7/2021  11:07 AM    Leanne Chung is a 72year old male. Chief complaint(s): Patient presents with:  Test Results: Abnormal Diabetes labs from 05/28    HPI:     Leanne Chung primary complaint is regarding uncontrol diabetes.      Patient Kavitha Use      Smoking status: Former Smoker        Years: 20.00        Quit date: 6/10/1993        Years since quittin.0      Smokeless tobacco: Never Used    Vaping Use      Vaping Use: Never used    Alcohol use: Yes    Drug use: No       Immunizations: (TECHLITE PEN NEEDLES) 31G X 8 MM Does not apply Misc USE DAILY AS DIRECTED 100 each 0   • Insulin Syringe-Needle U-100 32G X 5/16\" 1 ML Does not apply Misc 1 each by Does not apply route daily.  498 each 1   • TRULICITY 1.5 QH/0.6DT Subcutaneous Solution Systems   Constitutional: Negative for appetite change, fatigue and fever. Eyes: Negative for visual disturbance. Respiratory: Negative for shortness of breath. Cardiovascular: Negative for chest pain.    Gastrointestinal: Negative for abdominal pain EVERY DAY WITH BREAKFAST, Disp: 30 tablet, Rfl: 0  •  SYNJARDY XR  MG Oral Tablet 24 Hr, TAKE 1 TABLET BY MOUTH DAILY, Disp: 90 tablet, Rfl: 1  •  tamsulosin (FLOMAX) cap, TAKE 1 CAPSULE BY MOUTH EVERY DAY, Disp: 90 capsule, Rfl: 2  •  ergocalcifero MG Oral Tab EC, Take 1 tablet (81 mg total) by mouth daily. , Disp: 100 tablet, Rfl: 2  •  Glucose Blood (ONETOUCH VERIO) In Vitro Strip, TEST BID UTD, Disp: 100 strip, Rfl: 5  •  ONETOUCH DELICA LANCETS FINE Does not apply Misc, TEST BID UTD, Disp: , Rfl: glucoses ( mg/dl) and post meal glucoses (<140-160 mg/dl) Home glucose testing discussed. The A1c target of <7% according to ADA and <6.5% according to AACE were discussed.            Orders This Visit:  No orders of the defined types were placed in t

## 2021-06-10 DIAGNOSIS — M48.061 LUMBAR FORAMINAL STENOSIS: ICD-10-CM

## 2021-06-10 DIAGNOSIS — G62.9 NEUROPATHY: ICD-10-CM

## 2021-06-10 RX ORDER — AMITRIPTYLINE HYDROCHLORIDE 25 MG/1
TABLET, FILM COATED ORAL
Qty: 90 TABLET | Refills: 0 | Status: SHIPPED | OUTPATIENT
Start: 2021-06-10 | End: 2021-06-30

## 2021-06-10 NOTE — TELEPHONE ENCOUNTER
Medication request: Amitriptyline HCl 25 MG Oral Tab    Si tab PO at bedtime x1 week, then 2 tab PO at bedtime afterwards if no side effects    LOV:21  NOV: None    [place] 21 qty#30 r-0 (18 day supply)    90 day supply

## 2021-06-17 DIAGNOSIS — E11.65 UNCONTROLLED TYPE 2 DIABETES MELLITUS WITH HYPERGLYCEMIA (HCC): ICD-10-CM

## 2021-06-17 NOTE — TELEPHONE ENCOUNTER
•  insulin glargine (LANTUS SOLOSTAR) 100 UNIT/ML Subcutaneous Solution Pen-injector, Inject 20 Units into the skin daily. , Disp: 5 each, Rfl: 3    •  insulin aspart (NOVOLOG) 100 UNIT/ML Subcutaneous Solution, Inject 3 Units into the skin 3 (three) times

## 2021-06-18 DIAGNOSIS — E11.65 UNCONTROLLED TYPE 2 DIABETES MELLITUS WITH HYPERGLYCEMIA (HCC): ICD-10-CM

## 2021-06-18 RX ORDER — ATORVASTATIN CALCIUM 40 MG/1
40 TABLET, FILM COATED ORAL NIGHTLY
Qty: 90 TABLET | Refills: 1 | Status: SHIPPED | OUTPATIENT
Start: 2021-06-18 | End: 2021-06-22

## 2021-06-18 RX ORDER — BLOOD SUGAR DIAGNOSTIC
STRIP MISCELLANEOUS
Qty: 200 STRIP | Refills: 11 | Status: SHIPPED | OUTPATIENT
Start: 2021-06-18 | End: 2021-06-22

## 2021-06-18 RX ORDER — EMPAGLIFLOZIN, METFORMIN HYDROCHLORIDE 25; 1000 MG/1; MG/1
1 TABLET, EXTENDED RELEASE ORAL DAILY
Qty: 90 TABLET | Refills: 1 | Status: SHIPPED | OUTPATIENT
Start: 2021-06-18 | End: 2021-09-27

## 2021-06-18 RX ORDER — GLIMEPIRIDE 2 MG/1
2 TABLET ORAL
Qty: 90 TABLET | Refills: 1 | Status: SHIPPED | OUTPATIENT
Start: 2021-06-18 | End: 2021-08-22

## 2021-06-18 RX ORDER — BLOOD SUGAR DIAGNOSTIC
STRIP MISCELLANEOUS
Qty: 200 STRIP | Refills: 11 | Status: SHIPPED | OUTPATIENT
Start: 2021-06-18 | End: 2021-09-27

## 2021-06-18 RX ORDER — LOSARTAN POTASSIUM 25 MG/1
25 TABLET ORAL DAILY
Qty: 90 TABLET | Refills: 1 | Status: SHIPPED | OUTPATIENT
Start: 2021-06-18 | End: 2021-08-03

## 2021-06-18 RX ORDER — KETOCONAZOLE 20 MG/G
CREAM TOPICAL
Qty: 60 G | Refills: 1 | Status: SHIPPED | OUTPATIENT
Start: 2021-06-18 | End: 2021-08-17

## 2021-06-18 RX ORDER — TAMSULOSIN HYDROCHLORIDE 0.4 MG/1
CAPSULE ORAL
Qty: 90 CAPSULE | Refills: 2 | Status: SHIPPED | OUTPATIENT
Start: 2021-06-18

## 2021-06-18 RX ORDER — METOPROLOL SUCCINATE 25 MG/1
25 TABLET, EXTENDED RELEASE ORAL DAILY
Qty: 90 TABLET | Refills: 1 | Status: SHIPPED | OUTPATIENT
Start: 2021-06-18 | End: 2021-09-27

## 2021-06-18 RX ORDER — BLOOD-GLUCOSE METER
EACH MISCELLANEOUS
Qty: 3 EACH | Refills: 3 | Status: SHIPPED | OUTPATIENT
Start: 2021-06-18 | End: 2021-06-22

## 2021-06-18 NOTE — TELEPHONE ENCOUNTER
Spoke with pt,  verified, pt stated he doesn't need refill for lantus, novolog and Betamethasone at this time but the rest could be refill. Pt check his bld sugar BID. Pt req other meds to be send to [facility].      Requested Prescriptions     Pen

## 2021-06-18 NOTE — TELEPHONE ENCOUNTER
Per pharmacy, pt requesting a refill on the following medications:    Metoprolol succinate er 25mg tablet extended release 24 hr    Synjardy XR 25mg-1,000mg tablet, extended release 24 hr    Tamsulosin 0.4mg capsule

## 2021-06-22 RX ORDER — BLOOD SUGAR DIAGNOSTIC
STRIP MISCELLANEOUS
Refills: 0 | OUTPATIENT
Start: 2021-06-22

## 2021-06-22 RX ORDER — PEN NEEDLE, DIABETIC 31 GX5/16"
NEEDLE, DISPOSABLE MISCELLANEOUS
Qty: 100 EACH | Refills: 0 | Status: SHIPPED | OUTPATIENT
Start: 2021-06-22 | End: 2021-08-21

## 2021-06-22 RX ORDER — ATORVASTATIN CALCIUM 40 MG/1
40 TABLET, FILM COATED ORAL NIGHTLY
Qty: 90 TABLET | Refills: 1 | Status: SHIPPED | OUTPATIENT
Start: 2021-06-22 | End: 2021-09-27

## 2021-06-22 RX ORDER — INSULIN GLARGINE 100 [IU]/ML
20 INJECTION, SOLUTION SUBCUTANEOUS DAILY
Qty: 5 EACH | Refills: 3 | Status: SHIPPED | OUTPATIENT
Start: 2021-06-22 | End: 2021-09-27

## 2021-06-22 RX ORDER — INSULIN ASPART 100 [IU]/ML
3 INJECTION, SOLUTION INTRAVENOUS; SUBCUTANEOUS
Qty: 5 EACH | Refills: 3 | Status: SHIPPED | OUTPATIENT
Start: 2021-06-22 | End: 2021-09-27

## 2021-06-22 RX ORDER — DULOXETIN HYDROCHLORIDE 30 MG/1
CAPSULE, DELAYED RELEASE ORAL
Qty: 180 CAPSULE | Refills: 0 | Status: SHIPPED | OUTPATIENT
Start: 2021-06-22 | End: 2021-06-30

## 2021-06-22 RX ORDER — BLOOD SUGAR DIAGNOSTIC
STRIP MISCELLANEOUS
Qty: 200 STRIP | Refills: 11 | Status: SHIPPED | OUTPATIENT
Start: 2021-06-22 | End: 2021-09-27

## 2021-06-22 RX ORDER — DULAGLUTIDE 1.5 MG/.5ML
1.5 INJECTION, SOLUTION SUBCUTANEOUS WEEKLY
Qty: 4 EACH | Refills: 3 | Status: SHIPPED | OUTPATIENT
Start: 2021-06-22 | End: 2021-08-27

## 2021-06-22 RX ORDER — BLOOD-GLUCOSE METER
EACH MISCELLANEOUS
Qty: 3 EACH | Refills: 3 | Status: SHIPPED | OUTPATIENT
Start: 2021-06-22 | End: 2021-09-27

## 2021-06-22 NOTE — TELEPHONE ENCOUNTER
insulin glargine (LANTUS SOLOSTAR) 100 UNIT/ML Subcutaneous Solution Pen-injector, Inject 20 Units into the skin daily. , Disp: 5 each, Rfl: 3    insulin aspart (NOVOLOG) 100 UNIT/ML Subcutaneous Solution, Inject 3 Units into the skin 3 (three) times daily

## 2021-06-22 NOTE — TELEPHONE ENCOUNTER
Per pharmacy, pt requesting a refill on the following medications:    Trulicity 5.5KH/4.0SO subcutaneous pen injector    Droplet pen needle 31 gauge x 5/1\"

## 2021-06-24 ENCOUNTER — TELEPHONE (OUTPATIENT)
Dept: NEUROLOGY | Facility: CLINIC | Age: 66
End: 2021-06-24

## 2021-06-24 DIAGNOSIS — M48.061 LUMBAR FORAMINAL STENOSIS: ICD-10-CM

## 2021-06-24 DIAGNOSIS — G62.9 NEUROPATHY: ICD-10-CM

## 2021-06-24 NOTE — TELEPHONE ENCOUNTER
[facility] pharmacy left a VM regarding amitriptyline refill from 6/17/21 stating they need a new rx. No other details were given.

## 2021-06-25 NOTE — TELEPHONE ENCOUNTER
Medication request: Amitriptyline 25mg 1 tab PO at bedtime x1 week, then 2 tab PO at bedtime afterwards if no side effects    LOV: 05/27/21  NOV: none    [place] 06/10/21 #90 r-0    Patient requesting amitriptyline be sent to [facility] order.

## 2021-06-30 RX ORDER — AMITRIPTYLINE HYDROCHLORIDE 25 MG/1
50 TABLET, FILM COATED ORAL NIGHTLY
Qty: 180 TABLET | Refills: 0 | Status: SHIPPED | OUTPATIENT
Start: 2021-06-30 | End: 2021-09-28

## 2021-08-02 NOTE — TELEPHONE ENCOUNTER
Per chart Rx was sent to mail order pharmacy. [facility] is now requesting refill    Left Message To Call Back to clarify with pt which pharmacy he is using for this prescription.

## 2021-08-03 RX ORDER — LOSARTAN POTASSIUM 25 MG/1
TABLET ORAL
Qty: 90 TABLET | Refills: 1 | Status: SHIPPED | OUTPATIENT
Start: 2021-08-03 | End: 2021-09-27

## 2021-08-03 NOTE — TELEPHONE ENCOUNTER
With  Bety ID #[id number], Ad[facility]. Patient uses [facility].

## 2021-08-17 RX ORDER — KETOCONAZOLE 20 MG/G
CREAM TOPICAL
Qty: 60 G | Refills: 1 | Status: SHIPPED | OUTPATIENT
Start: 2021-08-17

## 2021-08-17 NOTE — TELEPHONE ENCOUNTER
Please review; protocol failed/no protocol    Requested Prescriptions   Pending Prescriptions Disp Refills    KETOCONAZOLE 2 % External Cream [Pharmacy Med Name: KETOCONAZOLE 2 % Cream] 60 g 1     Sig: RUB ON THE AFFECTED SKIN AREA TWICE DAILY FOR ITCHING

## 2021-08-21 RX ORDER — PEN NEEDLE, DIABETIC 31 GX5/16"
NEEDLE, DISPOSABLE MISCELLANEOUS
Qty: 100 EACH | Refills: 0 | Status: SHIPPED | OUTPATIENT
Start: 2021-08-21 | End: 2021-08-22

## 2021-08-22 DIAGNOSIS — E11.65 UNCONTROLLED TYPE 2 DIABETES MELLITUS WITH HYPERGLYCEMIA (HCC): ICD-10-CM

## 2021-08-22 RX ORDER — PEN NEEDLE, DIABETIC 31 GX5/16"
NEEDLE, DISPOSABLE MISCELLANEOUS
Qty: 100 EACH | Refills: 3 | Status: SHIPPED | OUTPATIENT
Start: 2021-08-22 | End: 2022-01-27

## 2021-08-22 NOTE — TELEPHONE ENCOUNTER
Refill passed per [place] [facility], [facility] protocol.     Requested Prescriptions   Pending Prescriptions Disp Refills    TECHLITE PEN NEEDLES 31G X 8 MM Does not apply Misc [Pharmacy Med Name: Femi Cheatham [id number]] 100 each 0     Sig: USE DAILY AS DIRECTED        Diabetic Supplies Protocol Passed - 8/22/2021  5:55 AM        Passed - Appointment in past 12 or next 3 months              Recent Outpatient Visits              2 months ago Uncontrolled type 2 diabetes mellitus with hyperglycemia [facility])    [facility]    Office Visit    2 months ago Lumbar foraminal stenosis    LOUIE Banda, DO    Office Visit    2 months ago Medicare annual wellness visit, subsequent    [facility], [facility] Wai [facility]    Office Visit    10 months ago Lumbar foraminal stenosis    LOUIE Banda, [facility] Visit    10 months ago     [facility] in \[facility] [place] [facility]"" [id number] Essence Núñez, PT    Office Visit

## 2021-08-22 NOTE — TELEPHONE ENCOUNTER
Please review. Protocol failed or does not have a protocol.      Requested Prescriptions   Pending Prescriptions Disp Refills    GLIMEPIRIDE 2 MG Oral Tab [Pharmacy Med Name: GLIMEPIRIDE 2MG TABLETS] 30 tablet 0     Sig: TAKE 1 TABLET BY MOUTH EVERY DAY WIT

## 2021-08-23 RX ORDER — GLIMEPIRIDE 2 MG/1
2 TABLET ORAL
Qty: 30 TABLET | Refills: 5 | Status: SHIPPED | OUTPATIENT
Start: 2021-08-23 | End: 2021-09-27

## 2021-08-27 RX ORDER — DULAGLUTIDE 1.5 MG/.5ML
INJECTION, SOLUTION SUBCUTANEOUS
Qty: 12 EACH | Refills: 0 | Status: SHIPPED | OUTPATIENT
Start: 2021-08-27 | End: 2021-09-27

## 2021-09-21 RX ORDER — DULOXETIN HYDROCHLORIDE 30 MG/1
CAPSULE, DELAYED RELEASE ORAL
Qty: 180 CAPSULE | Refills: 0 | OUTPATIENT
Start: 2021-09-21

## 2021-09-21 NOTE — TELEPHONE ENCOUNTER
Language Yash Gloss ID # [id number]. Left message for call back. Please clarify is patient is requesting Duloxetine. Medication is not on current med list and looks like it was discontinued.

## 2021-09-21 NOTE — TELEPHONE ENCOUNTER
Patient stated he does not need medication and unsure why pharmacy requested a refill. He stated Dr. Randolph Woodard took him off medication and will discuss with him at his next appt 09/27/21.

## 2021-09-27 ENCOUNTER — OFFICE VISIT (OUTPATIENT)
Dept: FAMILY MEDICINE CLINIC | Facility: CLINIC | Age: 66
End: 2021-09-27
Payer: MEDICARE

## 2021-09-27 VITALS
DIASTOLIC BLOOD PRESSURE: 80 MMHG | WEIGHT: 254.38 LBS | SYSTOLIC BLOOD PRESSURE: 120 MMHG | HEIGHT: 66 IN | BODY MASS INDEX: 40.88 KG/M2 | HEART RATE: 80 BPM

## 2021-09-27 DIAGNOSIS — E78.00 PURE HYPERCHOLESTEROLEMIA: ICD-10-CM

## 2021-09-27 DIAGNOSIS — I10 ESSENTIAL HYPERTENSION: ICD-10-CM

## 2021-09-27 DIAGNOSIS — E11.65 UNCONTROLLED TYPE 2 DIABETES MELLITUS WITH HYPERGLYCEMIA (HCC): Primary | ICD-10-CM

## 2021-09-27 LAB — HEMOGLOBIN A1C: 8.9 % (ref 4.3–5.6)

## 2021-09-27 PROCEDURE — 3074F SYST BP LT 130 MM HG: CPT | Performed by: FAMILY MEDICINE

## 2021-09-27 PROCEDURE — 83036 HEMOGLOBIN GLYCOSYLATED A1C: CPT | Performed by: FAMILY MEDICINE

## 2021-09-27 PROCEDURE — 3079F DIAST BP 80-89 MM HG: CPT | Performed by: FAMILY MEDICINE

## 2021-09-27 PROCEDURE — 3052F HG A1C>EQUAL 8.0%<EQUAL 9.0%: CPT | Performed by: NURSE PRACTITIONER

## 2021-09-27 PROCEDURE — 3008F BODY MASS INDEX DOCD: CPT | Performed by: FAMILY MEDICINE

## 2021-09-27 PROCEDURE — 99214 OFFICE O/P EST MOD 30 MIN: CPT | Performed by: FAMILY MEDICINE

## 2021-09-27 RX ORDER — ATORVASTATIN CALCIUM 40 MG/1
40 TABLET, FILM COATED ORAL NIGHTLY
Qty: 90 TABLET | Refills: 1 | Status: SHIPPED | OUTPATIENT
Start: 2021-09-27

## 2021-09-27 RX ORDER — INSULIN GLARGINE 100 [IU]/ML
30 INJECTION, SOLUTION SUBCUTANEOUS 2 TIMES DAILY
Qty: 5 EACH | Refills: 3 | Status: SHIPPED | OUTPATIENT
Start: 2021-09-27 | End: 2021-12-25

## 2021-09-27 RX ORDER — DULAGLUTIDE 1.5 MG/.5ML
1.5 INJECTION, SOLUTION SUBCUTANEOUS WEEKLY
Qty: 12 EACH | Refills: 1 | Status: SHIPPED | OUTPATIENT
Start: 2021-09-27

## 2021-09-27 RX ORDER — DAPAGLIFLOZIN AND METFORMIN HYDROCHLORIDE 5; 1000 MG/1; MG/1
1 TABLET, FILM COATED, EXTENDED RELEASE ORAL 2 TIMES DAILY
Qty: 180 TABLET | Refills: 1 | Status: SHIPPED | OUTPATIENT
Start: 2021-09-27 | End: 2021-11-28

## 2021-09-27 RX ORDER — LOSARTAN POTASSIUM 25 MG/1
25 TABLET ORAL DAILY
Qty: 90 TABLET | Refills: 1 | Status: SHIPPED | OUTPATIENT
Start: 2021-09-27

## 2021-09-27 RX ORDER — INSULIN ASPART 100 [IU]/ML
5 INJECTION, SOLUTION INTRAVENOUS; SUBCUTANEOUS
Qty: 5 EACH | Refills: 3 | Status: SHIPPED | OUTPATIENT
Start: 2021-09-27

## 2021-09-27 RX ORDER — METOPROLOL SUCCINATE 25 MG/1
25 TABLET, EXTENDED RELEASE ORAL DAILY
Qty: 90 TABLET | Refills: 1 | Status: SHIPPED | OUTPATIENT
Start: 2021-09-27

## 2021-09-27 RX ORDER — INSULIN GLARGINE 100 [IU]/ML
30 INJECTION, SOLUTION SUBCUTANEOUS 2 TIMES DAILY
Qty: 5 EACH | Refills: 3 | Status: SHIPPED | OUTPATIENT
Start: 2021-09-27 | End: 2021-09-27

## 2021-09-27 RX ORDER — INSULIN ASPART 100 [IU]/ML
5 INJECTION, SOLUTION INTRAVENOUS; SUBCUTANEOUS
Qty: 5 EACH | Refills: 3 | Status: SHIPPED | OUTPATIENT
Start: 2021-09-27 | End: 2021-09-27

## 2021-09-27 NOTE — PROGRESS NOTES
9/27/2021  10:25 AM    Jeremiah Curry is a 72year old male. Chief complaint(s): Patient presents with:  Diabetes: f/u  HTN  Prostate Problem    HPI:     Jeremiah Curry primary complaint is regarding multiple complaints.      Yannick Sarabia hypercholesterolemia since > 5 yrs.  Current treatment includes Atorvastatin 20 mg (medication) and a low cholesterol/low fat diet.  Compliance with treatment has been good.  he denies experiencing any hypercholesterolemia related symptoms.  Average lipid 03/03/2014 11/08/2017      Medications (Active prior to today's visit):  Current Outpatient Medications   Medication Sig Dispense Refill   • atorvastatin 40 MG Oral Tab Take 1 tablet (40 mg total) by mouth nightly.  90 tablet 1   • Losartan Potassium giving insulin     • aspirin EC 81 MG Oral Tab EC Take 1 tablet (81 mg total) by mouth daily. 100 tablet 2       Allergies:  No Known Allergies      ROS:   Review of Systems   Constitutional: Negative for appetite change, fatigue and fever.    Eyes: Negativ Radiology: No results found. ASSESSMENT/PLAN:   Assessment   Uncontrolled type 2 diabetes mellitus with hyperglycemia (hcc)  (primary encounter diagnosis)  Essential hypertension  Pure hypercholesterolemia    1.  Uncontrolled type 2 diabetes mellitu Syringe-Needle U-100 32G X 5/16\" 1 ML Does not apply Misc, 1 each by Does not apply route daily. , Disp: 100 each, Rfl: 1  •  Accu-Chek FastClix Lancets Does not apply Misc, 1 lancet by Does not apply route 2 (two) times daily. , Disp: 200 each, Rfl: 0  • calorie ADA diet,  5 pound weight loss, a graduated exercise program, HgbA1C level checked quarterly, daily foot self-inspection, need for yearly flu shots, and avoid all sodas, juices, candy, chocolates, cakes, ice cream, etc.      FOLLOW-UP: Schedule a f given include: avoid pseudoephedrine or other stimulants/decongestants in common cold remedies, decrease consumption of alcohol, perform routine monitoring of blood pressure with home blood pressure cuff, exercise, reduction of dietary salt intake, take me medications' side effects. REFUSALS:  none. FOLLOW-UP: Schedule a follow-up visit in 6 months.          Orders This Visit:  Orders Placed This Encounter      POC Glycohemoglobin [10380]      Meds This Visit:  Requested Prescriptions     Signed Prescri

## 2021-09-28 ENCOUNTER — OFFICE VISIT (OUTPATIENT)
Dept: PHYSICAL MEDICINE AND REHAB | Facility: CLINIC | Age: 66
End: 2021-09-28
Payer: MEDICARE

## 2021-09-28 VITALS — WEIGHT: 254 LBS | BODY MASS INDEX: 40.82 KG/M2 | HEIGHT: 66 IN

## 2021-09-28 DIAGNOSIS — M54.16 LEFT LUMBAR RADICULITIS: Primary | ICD-10-CM

## 2021-09-28 PROCEDURE — 99214 OFFICE O/P EST MOD 30 MIN: CPT | Performed by: PHYSICAL MEDICINE & REHABILITATION

## 2021-09-28 PROCEDURE — 3008F BODY MASS INDEX DOCD: CPT | Performed by: PHYSICAL MEDICINE & REHABILITATION

## 2021-09-28 RX ORDER — AMITRIPTYLINE HYDROCHLORIDE 75 MG/1
75 TABLET, FILM COATED ORAL NIGHTLY
Qty: 90 TABLET | Refills: 0 | Status: SHIPPED | OUTPATIENT
Start: 2021-09-28 | End: 2021-12-27

## 2021-09-28 NOTE — PATIENT INSTRUCTIONS
I increased the dose of the amitriptyline to 75mg. Put aside the old prescription, and take 1 tablet only at night time.

## 2021-09-28 NOTE — PROGRESS NOTES
Progress note    C/C: left leg pain, right low back pain    HPI: 71 yo m primarily Luxembourgish-speaking, presents with right lower back and left leg burning pain radiating down the left lower extremity to the lateral thigh and medial knee.   This encounter is d transforaminal epidural steroid injection under fluoroscopy, local.    Follow-up in 3 to 4 months. Documentation was created using Dragon speech technology; please excuse any typos.     Christina Nieves DO  Physical Medicine and Rehabilitation  [facility]

## 2021-10-05 ENCOUNTER — TELEPHONE (OUTPATIENT)
Dept: FAMILY MEDICINE CLINIC | Facility: CLINIC | Age: 66
End: 2021-10-05

## 2021-10-05 DIAGNOSIS — R35.1 BENIGN PROSTATIC HYPERPLASIA WITH NOCTURIA: Primary | ICD-10-CM

## 2021-10-05 DIAGNOSIS — N40.1 BENIGN PROSTATIC HYPERPLASIA WITH NOCTURIA: Primary | ICD-10-CM

## 2021-10-05 NOTE — TELEPHONE ENCOUNTER
Patient is following up. Please advise. Patient has an appointment with the urologist tomorrow morning.

## 2021-10-05 NOTE — TELEPHONE ENCOUNTER
Patient requesting referral for Dr Layla Monahan MD, Luz Marina Schmidt (TEL: [phone])  Patient has an appointment  tomorrow   Please advs

## 2021-10-05 NOTE — TELEPHONE ENCOUNTER
Pt is calling for status of his referral. Pt states that he has an appt tomorrow at 10:00. Please, call pt at [phone].

## 2021-10-15 NOTE — TELEPHONE ENCOUNTER
Referral # [id number]    Referral Notes  Number of Notes: 1    . Type Date User Summary Attachment    10/08/2021 11:59 AM Hernandez Motley - -   Note    [employer] O, authorization not require to see [facility] ELLIOT spec.

## 2021-11-09 ENCOUNTER — IMMUNIZATION (OUTPATIENT)
Dept: FAMILY MEDICINE CLINIC | Facility: CLINIC | Age: 66
End: 2021-11-09
Payer: MEDICARE

## 2021-11-09 DIAGNOSIS — Z23 NEED FOR VACCINATION: Primary | ICD-10-CM

## 2021-11-09 PROCEDURE — 90662 IIV NO PRSV INCREASED AG IM: CPT | Performed by: FAMILY MEDICINE

## 2021-11-09 PROCEDURE — G0008 ADMIN INFLUENZA VIRUS VAC: HCPCS | Performed by: FAMILY MEDICINE

## 2021-11-28 RX ORDER — DAPAGLIFLOZIN AND METFORMIN HYDROCHLORIDE 5; 1000 MG/1; MG/1
TABLET, FILM COATED, EXTENDED RELEASE ORAL
Qty: 180 TABLET | Refills: 1 | Status: SHIPPED | OUTPATIENT
Start: 2021-11-28

## 2021-12-08 ENCOUNTER — OFFICE VISIT (OUTPATIENT)
Dept: FAMILY MEDICINE CLINIC | Facility: CLINIC | Age: 66
End: 2021-12-08
Payer: MEDICARE

## 2021-12-08 VITALS
SYSTOLIC BLOOD PRESSURE: 133 MMHG | HEART RATE: 99 BPM | WEIGHT: 257 LBS | HEIGHT: 66 IN | DIASTOLIC BLOOD PRESSURE: 74 MMHG | BODY MASS INDEX: 41.3 KG/M2

## 2021-12-08 DIAGNOSIS — M53.86 SCIATICA ASSOCIATED WITH DISORDER OF LUMBAR SPINE: ICD-10-CM

## 2021-12-08 DIAGNOSIS — R20.0 NUMBNESS AND TINGLING IN RIGHT HAND: ICD-10-CM

## 2021-12-08 DIAGNOSIS — M54.9 BILATERAL BACK PAIN, UNSPECIFIED BACK LOCATION, UNSPECIFIED CHRONICITY: Primary | ICD-10-CM

## 2021-12-08 DIAGNOSIS — R20.2 NUMBNESS AND TINGLING IN RIGHT HAND: ICD-10-CM

## 2021-12-08 PROBLEM — J41.0 SMOKERS' COUGH (HCC): Chronic | Status: ACTIVE | Noted: 2021-12-08

## 2021-12-08 PROCEDURE — 99214 OFFICE O/P EST MOD 30 MIN: CPT | Performed by: NURSE PRACTITIONER

## 2021-12-08 PROCEDURE — 3075F SYST BP GE 130 - 139MM HG: CPT | Performed by: NURSE PRACTITIONER

## 2021-12-08 PROCEDURE — 3078F DIAST BP <80 MM HG: CPT | Performed by: NURSE PRACTITIONER

## 2021-12-08 PROCEDURE — 3008F BODY MASS INDEX DOCD: CPT | Performed by: NURSE PRACTITIONER

## 2021-12-08 RX ORDER — NAPROXEN 500 MG/1
500 TABLET ORAL 2 TIMES DAILY WITH MEALS
Qty: 40 TABLET | Refills: 0 | Status: SHIPPED | OUTPATIENT
Start: 2021-12-08

## 2021-12-08 RX ORDER — TIZANIDINE 4 MG/1
4 TABLET ORAL EVERY 6 HOURS PRN
Qty: 60 TABLET | Refills: 0 | Status: SHIPPED | OUTPATIENT
Start: 2021-12-08

## 2021-12-08 NOTE — ASSESSMENT & PLAN NOTE
Apply ice 20 min 4-6 times per day  Gentle stretching  Please call if symptoms worsen or are not resolving.   Has follow up appointment physiatry

## 2021-12-08 NOTE — PATIENT INSTRUCTIONS
Dolor De Espalda [Back Pain: Acute Or Chronic]    El dolor de espalda, por lo general, se debe a romaine lesión de los músculos o ligamentos de la columna vertebral. En ocasiones, los discos que separan las vértebras (los [employer]) de la columna entre sí puede flexionadas cerca del pecho y romaine [address] 3. Evite estar mucho tiempo sentado. Eso causa más tensión en la parte inferior de la espalda que estando de pie o caminando.   4. [address] primeros días después de la lesión, aplíquese jez.  © 8802-5668 [employer] [address]. Todos los derechos reservados. Esta información no pretende sustituir la atención médica profesional. Sólo aguirre médico puede diagnosticar y tratar un problema de zak.         Cuidado personal para el dolor en l lesión  · Cuando se mueva, incline la cadera y doble las rodillas. No se doble o gire por la cintura. · Cuando levante objetos, manténgalos cerca del cuerpo. Levante los objetos pesados con las piernas y no con la espalda.  No trate de levantar más peso de realizado un movimiento simple darnell extraño. En ambos casos, también suele suceder un espasmo muscular. El espasmo muscular hace que el dolor sea peor.    Un proveedor de [employer] hará el diagnóstico de ciática a partir de jourdan síntomas y un examen ayudarle. Asimismo, puede probar el uso de romiane compresa de hielo. Puede hacer aguirre propia compresa colocando cubos de hielo en romaine bolsa plástica. Envuelva la bolsa en romaine toalla delgada. Pruebe tanto el calor sacha el frío para jason cuál le funciona mejor.  Us

## 2021-12-08 NOTE — PROGRESS NOTES
HPI  Pt here for back pain that is radiating to neck, shoulders and bilat legs. Symptoms started a 4 days ago. Has sides of legs feel like they are falling asleep as well as right 4-5th fingers. Symptoms in legs worse w sitting for a long period of time. Years of education: Not on file      Highest education level: Not on file    Occupational History      Not on file    Tobacco Use      Smoking status: Former Smoker        Years: 20.00        Quit date: 6/10/1993        Years since quittin.5      Smo Losartan Potassium 25 MG Oral Tab Take 1 tablet (25 mg total) by mouth daily. 90 tablet 1   • metoprolol succinate 25 MG Oral Tablet 24 Hr Take 1 tablet (25 mg total) by mouth daily.  90 tablet 1   • TRULICITY 1.5 SW/0.0CY Subcutaneous Solution Pen-injector Spasms and tenderness present. Decreased range of motion. Back:    Neurological:      Mental Status: He is alert.          Assessment and Plan:   Problem List Items Addressed This Visit        Neurologic    Numbness and tingling in right hand     Fol

## 2021-12-21 ENCOUNTER — TELEPHONE (OUTPATIENT)
Dept: FAMILY MEDICINE CLINIC | Facility: CLINIC | Age: 66
End: 2021-12-21

## 2021-12-21 NOTE — TELEPHONE ENCOUNTER
Reached patient, via [facility] [place] , for medication adherence consult. Patient is coming due for refills on atorvastatin and losartan. Patient states he is out of these medications and is requesting assistance in refilling.  He tells me they weren'

## 2021-12-21 NOTE — TELEPHONE ENCOUNTER
Call to pharmacy and requested refill on: Atorvastatin  Losartan  Xigduo  Novolog   Trulicity   Tamsulosin    They all had refills and were processed. Pharmacy will contact patient when ready for .

## 2021-12-21 NOTE — TELEPHONE ENCOUNTER
Pt has h/o htn; last meloxicam order was 5/2020-can wrosen HTN.   Agree that pt will need to be seen for additional pain meds

## 2021-12-24 DIAGNOSIS — M54.16 LEFT LUMBAR RADICULITIS: ICD-10-CM

## 2021-12-25 NOTE — TELEPHONE ENCOUNTER
Please review; protocol failed/no protocol.      Requested Prescriptions   Pending Prescriptions Disp Refills    LANTUS SOLOSTAR 100 UNIT/ML Subcutaneous Solution Pen-injector [Pharmacy Med Name: LANTUS SOLOSTAR PEN INJ 3ML] 15 mL 0     Sig: ADMINISTER 30 U

## 2021-12-26 RX ORDER — INSULIN GLARGINE 100 [IU]/ML
30 INJECTION, SOLUTION SUBCUTANEOUS 2 TIMES DAILY
Qty: 15 ML | Refills: 1 | Status: SHIPPED | OUTPATIENT
Start: 2021-12-26

## 2021-12-27 RX ORDER — AMITRIPTYLINE HYDROCHLORIDE 75 MG/1
TABLET, FILM COATED ORAL
Qty: 90 TABLET | Refills: 0 | Status: SHIPPED | OUTPATIENT
Start: 2021-12-27

## 2021-12-27 NOTE — TELEPHONE ENCOUNTER
Medication request: amitriptyline 75 MG Oral Tab Take 1 tablet (75 mg total) by mouth nightly.     LOV: 9/28/21  NOV: 12/30/21     [place]: 9/28/21 #90-90 day supply  Urine drug screen (if applicable): n/a    Per  at LOV: \"Assessment an

## 2021-12-30 ENCOUNTER — OFFICE VISIT (OUTPATIENT)
Dept: PHYSICAL MEDICINE AND REHAB | Facility: CLINIC | Age: 66
End: 2021-12-30
Payer: MEDICARE

## 2021-12-30 ENCOUNTER — TELEPHONE (OUTPATIENT)
Dept: PHYSICAL MEDICINE AND REHAB | Facility: CLINIC | Age: 66
End: 2021-12-30

## 2021-12-30 VITALS
HEART RATE: 76 BPM | WEIGHT: 257 LBS | DIASTOLIC BLOOD PRESSURE: 80 MMHG | BODY MASS INDEX: 41.3 KG/M2 | SYSTOLIC BLOOD PRESSURE: 140 MMHG | OXYGEN SATURATION: 95 % | HEIGHT: 66 IN

## 2021-12-30 DIAGNOSIS — M48.062 LUMBAR STENOSIS WITH NEUROGENIC CLAUDICATION: Primary | ICD-10-CM

## 2021-12-30 PROCEDURE — 3079F DIAST BP 80-89 MM HG: CPT | Performed by: PHYSICAL MEDICINE & REHABILITATION

## 2021-12-30 PROCEDURE — 3077F SYST BP >= 140 MM HG: CPT | Performed by: PHYSICAL MEDICINE & REHABILITATION

## 2021-12-30 PROCEDURE — 99214 OFFICE O/P EST MOD 30 MIN: CPT | Performed by: PHYSICAL MEDICINE & REHABILITATION

## 2021-12-30 PROCEDURE — 3008F BODY MASS INDEX DOCD: CPT | Performed by: PHYSICAL MEDICINE & REHABILITATION

## 2021-12-30 RX ORDER — HYDROCODONE BITARTRATE AND ACETAMINOPHEN 7.5; 325 MG/1; MG/1
1 TABLET ORAL 2 TIMES DAILY PRN
Qty: 20 TABLET | Refills: 0 | Status: SHIPPED | OUTPATIENT
Start: 2021-12-30

## 2021-12-30 NOTE — TELEPHONE ENCOUNTER
Initiated authorization for   Bilateral L5 transforaminal epidural steroid injections CPT 82665-14 dx:M48.062 to be done at [facility] with [facility]  Status: Approved-authorization # will be released shortly valid 12/30/21-1/29/22  Tracking #[id number].    Aut

## 2021-12-30 NOTE — TELEPHONE ENCOUNTER
Patient has been scheduled for Bilateral L5 transforaminal epidural steroid injections on 02/07/22 at the [facility] with . -Anesthesia type: LOCAL.   -If receiving MAC or IVC sedation patient will need to get COVID tested 3 days prior even if already

## 2021-12-31 NOTE — PROGRESS NOTES
Progress note    C/C: low back and leg pain    HPI: 68-year-old male presents for follow-up. Speaks primarily [address], presents with daughter who interprets.   Since last time I saw him he joined the gym, had been exercising more, and has developed bilater headaches. Patient elects to proceed. In the meantime he was prescribed Norco 7.5/325 to be taken up to twice daily as needed for severe pain. Of note, the patient has transitional anatomy. 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     Follow up fo

## 2022-01-06 ENCOUNTER — TELEPHONE (OUTPATIENT)
Dept: CASE MANAGEMENT | Age: 67
End: 2022-01-06

## 2022-01-06 NOTE — TELEPHONE ENCOUNTER
Patient is eligible for a 2022 Medicare Annual Wellness visit. This visit can be scheduled any time in the calendar year. Language Line  [phone] Catarino Kim, discussed w/pt. AHA scheduled for 2/14/22.

## 2022-01-24 ENCOUNTER — OFFICE VISIT (OUTPATIENT)
Dept: SURGERY | Facility: CLINIC | Age: 67
End: 2022-01-24

## 2022-01-24 DIAGNOSIS — M54.16 LUMBAR RADICULOPATHY: Primary | ICD-10-CM

## 2022-01-24 DIAGNOSIS — M48.062 LUMBAR STENOSIS WITH NEUROGENIC CLAUDICATION: ICD-10-CM

## 2022-01-24 PROCEDURE — 64483 NJX AA&/STRD TFRM EPI L/S 1: CPT | Performed by: PHYSICAL MEDICINE & REHABILITATION

## 2022-01-24 NOTE — PROCEDURES
[facility]nor [facility] [address]    BILATERAL LUMBAR TRANSFORAMINAL   NAME:  Juan Reilly    MR #:    [id number] :  10/27/1955     PHYSICIAN:  Franny Mejias DO        Operative Report    DATE OF PROCEDURE: 2022   PREOPERATIVE DIAGNOSES: 1. epinephrine. After this, the needle was removed. Then  fluoroscopy was right anterior obliqued opening up the left L5-S1 intervertebral foramen.   At this point in time, the patient's skin was anesthetized with 1 to 2 cc of 1% PF lidocaine without epineph

## 2022-01-27 RX ORDER — PEN NEEDLE, DIABETIC 31 GX5/16"
NEEDLE, DISPOSABLE MISCELLANEOUS
Qty: 400 EACH | Refills: 3 | Status: SHIPPED | OUTPATIENT
Start: 2022-01-27

## 2022-01-27 NOTE — TELEPHONE ENCOUNTER
Refill passed per [facility], [place], [facility] protocol.   Requested Prescriptions   Pending Prescriptions Disp Refills    Insulin Pen Needle (TECHLITE PEN NEEDLES) 31G X 8 MM Does not apply Misc 120 each 0     Sig: Use 4 times daily as directed        Diabetic Supplies

## 2022-01-27 NOTE — TELEPHONE ENCOUNTER
Patient requesting refills on needles for insulin pen injector. Patient states he injects insulin four times a day and only was given enough for two injections per day. Patient is asking for refills to be sent to the [address] [place] [address] in his chart.  Lulu

## 2022-02-08 RX ORDER — INSULIN GLARGINE 100 [IU]/ML
30 INJECTION, SOLUTION SUBCUTANEOUS 2 TIMES DAILY
Qty: 15 ML | Refills: 1 | Status: SHIPPED | OUTPATIENT
Start: 2022-02-08 | End: 2022-03-23

## 2022-02-08 NOTE — TELEPHONE ENCOUNTER
Please review. Protocol failed or has no protocol.      Requested Prescriptions   Pending Prescriptions Disp Refills    LANTUS SOLOSTAR 100 UNIT/ML Subcutaneous Solution Pen-injector [Pharmacy Med Name: Gabriela Powell PEN INJ 3ML] 15 mL 1     Sig: ADMINISTER 30 UNITS UNDER THE SKIN TWICE DAILY        Diabetes Medication Protocol Failed - 2/6/2022 12:44 PM        Failed - Last A1C < 7.5 and within past 6 months     Lab Results   Component Value Date    A1C 8.9 (A) 09/27/2021               Passed - Appointment in past 6 or next 3 months        Passed - GFR Non- > 50     Lab Results   Component Value Date    GFRNAA 99 05/28/2021                 Passed - GFR in the past 12 months              Recent Outpatient Visits              2 weeks ago Lumbar radiculopathy    [facility] Raghav Mendoza, DO    Office Visit    1 month ago Lumbar stenosis with neurogenic claudication    [facility] Raghav Mendoza, DO    Office Visit    2 months ago Bilateral back pain, unspecified back location, unspecified chronicity    [address], MIKA Rincon    Office Visit    4 months ago Left lumbar radiculitis    [facility] Raghav Mendoza, DO    Office Visit    4 months ago Uncontrolled type 2 diabetes mellitus with hyperglycemia [facility])    [address], Kaylie Dimas MD    Office Visit            Future Appointments         Provider Department Appt Notes    In 6 days Kay Kessler MD [address], Rodrick Manuel     In 2 weeks Raghav Mendoza, [facility] inj f/u

## 2022-02-14 ENCOUNTER — OFFICE VISIT (OUTPATIENT)
Dept: FAMILY MEDICINE CLINIC | Facility: CLINIC | Age: 67
End: 2022-02-14
Payer: MEDICARE

## 2022-02-14 VITALS
HEIGHT: 66 IN | BODY MASS INDEX: 40.79 KG/M2 | DIASTOLIC BLOOD PRESSURE: 75 MMHG | HEART RATE: 72 BPM | WEIGHT: 253.81 LBS | SYSTOLIC BLOOD PRESSURE: 118 MMHG

## 2022-02-14 DIAGNOSIS — M54.9 BILATERAL BACK PAIN, UNSPECIFIED BACK LOCATION, UNSPECIFIED CHRONICITY: ICD-10-CM

## 2022-02-14 DIAGNOSIS — N40.1 BENIGN PROSTATIC HYPERPLASIA WITH NOCTURIA: ICD-10-CM

## 2022-02-14 DIAGNOSIS — R35.1 NOCTURIA: ICD-10-CM

## 2022-02-14 DIAGNOSIS — Z91.81 RISK FOR FALLS: ICD-10-CM

## 2022-02-14 DIAGNOSIS — E78.00 PURE HYPERCHOLESTEROLEMIA: ICD-10-CM

## 2022-02-14 DIAGNOSIS — I10 ESSENTIAL HYPERTENSION: ICD-10-CM

## 2022-02-14 DIAGNOSIS — R35.1 BENIGN PROSTATIC HYPERPLASIA WITH NOCTURIA: ICD-10-CM

## 2022-02-14 DIAGNOSIS — E55.9 HYPOVITAMINOSIS D: ICD-10-CM

## 2022-02-14 DIAGNOSIS — Z79.4 TYPE 2 DIABETES MELLITUS WITHOUT COMPLICATION, WITH LONG-TERM CURRENT USE OF INSULIN (HCC): ICD-10-CM

## 2022-02-14 DIAGNOSIS — Z12.11 COLON CANCER SCREENING: ICD-10-CM

## 2022-02-14 DIAGNOSIS — K21.9 GASTROESOPHAGEAL REFLUX DISEASE WITHOUT ESOPHAGITIS: ICD-10-CM

## 2022-02-14 DIAGNOSIS — E66.01 SEVERE OBESITY (BMI 35.0-39.9) WITH COMORBIDITY (HCC): ICD-10-CM

## 2022-02-14 DIAGNOSIS — E11.9 TYPE 2 DIABETES MELLITUS WITHOUT COMPLICATION, WITH LONG-TERM CURRENT USE OF INSULIN (HCC): ICD-10-CM

## 2022-02-14 DIAGNOSIS — Z00.00 MEDICARE ANNUAL WELLNESS VISIT, SUBSEQUENT: Primary | ICD-10-CM

## 2022-02-14 DIAGNOSIS — K59.01 SLOW TRANSIT CONSTIPATION: ICD-10-CM

## 2022-02-14 DIAGNOSIS — M43.16 SPONDYLOLISTHESIS OF LUMBAR REGION: ICD-10-CM

## 2022-02-14 PROCEDURE — 3008F BODY MASS INDEX DOCD: CPT | Performed by: FAMILY MEDICINE

## 2022-02-14 PROCEDURE — 96160 PT-FOCUSED HLTH RISK ASSMT: CPT | Performed by: FAMILY MEDICINE

## 2022-02-14 PROCEDURE — 3074F SYST BP LT 130 MM HG: CPT | Performed by: FAMILY MEDICINE

## 2022-02-14 PROCEDURE — 99397 PER PM REEVAL EST PAT 65+ YR: CPT | Performed by: FAMILY MEDICINE

## 2022-02-14 PROCEDURE — 3078F DIAST BP <80 MM HG: CPT | Performed by: FAMILY MEDICINE

## 2022-02-14 PROCEDURE — G0439 PPPS, SUBSEQ VISIT: HCPCS | Performed by: FAMILY MEDICINE

## 2022-02-15 ENCOUNTER — LAB ENCOUNTER (OUTPATIENT)
Dept: LAB | Age: 67
End: 2022-02-15
Attending: FAMILY MEDICINE
Payer: MEDICARE

## 2022-02-15 DIAGNOSIS — Z79.4 TYPE 2 DIABETES MELLITUS WITHOUT COMPLICATION, WITH LONG-TERM CURRENT USE OF INSULIN (HCC): ICD-10-CM

## 2022-02-15 DIAGNOSIS — E78.00 PURE HYPERCHOLESTEROLEMIA: ICD-10-CM

## 2022-02-15 DIAGNOSIS — E11.9 TYPE 2 DIABETES MELLITUS WITHOUT COMPLICATION, WITH LONG-TERM CURRENT USE OF INSULIN (HCC): ICD-10-CM

## 2022-02-15 DIAGNOSIS — E55.9 HYPOVITAMINOSIS D: ICD-10-CM

## 2022-02-15 DIAGNOSIS — R35.1 NOCTURIA: ICD-10-CM

## 2022-02-15 DIAGNOSIS — I10 ESSENTIAL HYPERTENSION: ICD-10-CM

## 2022-02-15 LAB
ALBUMIN SERPL-MCNC: 3.8 G/DL (ref 3.4–5)
ALBUMIN/GLOB SERPL: 1.2 {RATIO} (ref 1–2)
ALP LIVER SERPL-CCNC: 76 U/L
ALT SERPL-CCNC: 29 U/L
ANION GAP SERPL CALC-SCNC: 9 MMOL/L (ref 0–18)
AST SERPL-CCNC: 11 U/L (ref 15–37)
BASOPHILS # BLD AUTO: 0.03 X10(3) UL (ref 0–0.2)
BASOPHILS NFR BLD AUTO: 0.5 %
BILIRUB SERPL-MCNC: 0.8 MG/DL (ref 0.1–2)
BILIRUB UR QL: NEGATIVE
BUN BLD-MCNC: 13 MG/DL (ref 7–18)
BUN/CREAT SERPL: 17.3 (ref 10–20)
CALCIUM BLD-MCNC: 9.4 MG/DL (ref 8.5–10.1)
CHLORIDE SERPL-SCNC: 108 MMOL/L (ref 98–112)
CHOLEST SERPL-MCNC: 178 MG/DL (ref ?–200)
CLARITY UR: CLEAR
CO2 SERPL-SCNC: 25 MMOL/L (ref 21–32)
COLOR UR: YELLOW
CREAT BLD-MCNC: 0.75 MG/DL
DEPRECATED RDW RBC AUTO: 43.2 FL (ref 35.1–46.3)
EOSINOPHIL # BLD AUTO: 0.16 X10(3) UL (ref 0–0.7)
EOSINOPHIL NFR BLD AUTO: 2.9 %
ERYTHROCYTE [DISTWIDTH] IN BLOOD BY AUTOMATED COUNT: 13.1 % (ref 11–15)
EST. AVERAGE GLUCOSE BLD GHB EST-MCNC: 160 MG/DL (ref 68–126)
FASTING PATIENT LIPID ANSWER: YES
FASTING STATUS PATIENT QL REPORTED: YES
GLOBULIN PLAS-MCNC: 3.3 G/DL (ref 2.8–4.4)
GLUCOSE BLD-MCNC: 120 MG/DL (ref 70–99)
GLUCOSE UR-MCNC: >=500 MG/DL
HBA1C MFR BLD: 7.2 % (ref ?–5.7)
HCT VFR BLD AUTO: 44.3 %
HDLC SERPL-MCNC: 49 MG/DL (ref 40–59)
HGB BLD-MCNC: 14.6 G/DL
HGB UR QL STRIP.AUTO: NEGATIVE
IMM GRANULOCYTES # BLD AUTO: 0.01 X10(3) UL (ref 0–1)
IMM GRANULOCYTES NFR BLD: 0.2 %
KETONES UR-MCNC: NEGATIVE MG/DL
LDLC SERPL CALC-MCNC: 103 MG/DL (ref ?–100)
LEUKOCYTE ESTERASE UR QL STRIP.AUTO: NEGATIVE
LYMPHOCYTES # BLD AUTO: 2.24 X10(3) UL (ref 1–4)
LYMPHOCYTES NFR BLD AUTO: 40.1 %
MCH RBC QN AUTO: 29.9 PG (ref 26–34)
MCHC RBC AUTO-ENTMCNC: 33 G/DL (ref 31–37)
MCV RBC AUTO: 90.6 FL
MONOCYTES # BLD AUTO: 0.39 X10(3) UL (ref 0.1–1)
MONOCYTES NFR BLD AUTO: 7 %
NEUTROPHILS # BLD AUTO: 2.75 X10 (3) UL (ref 1.5–7.7)
NEUTROPHILS # BLD AUTO: 2.75 X10(3) UL (ref 1.5–7.7)
NEUTROPHILS NFR BLD AUTO: 49.3 %
NITRITE UR QL STRIP.AUTO: NEGATIVE
NONHDLC SERPL-MCNC: 129 MG/DL (ref ?–130)
OSMOLALITY SERPL CALC.SUM OF ELEC: 295 MOSM/KG (ref 275–295)
PH UR: 6 [PH] (ref 5–8)
PLATELET # BLD AUTO: 185 10(3)UL (ref 150–450)
POTASSIUM SERPL-SCNC: 4.2 MMOL/L (ref 3.5–5.1)
PROT SERPL-MCNC: 7.1 G/DL (ref 6.4–8.2)
PROT UR-MCNC: NEGATIVE MG/DL
PSA SERPL-MCNC: 0.74 NG/ML (ref ?–4)
RBC # BLD AUTO: 4.89 X10(6)UL
SODIUM SERPL-SCNC: 142 MMOL/L (ref 136–145)
SP GR UR STRIP: 1.01 (ref 1–1.03)
TRIGL SERPL-MCNC: 151 MG/DL (ref 30–149)
TSI SER-ACNC: 0.79 MIU/ML (ref 0.36–3.74)
UROBILINOGEN UR STRIP-ACNC: <2
VIT D+METAB SERPL-MCNC: 21.1 NG/ML (ref 30–100)
VLDLC SERPL CALC-MCNC: 25 MG/DL (ref 0–30)
WBC # BLD AUTO: 5.6 X10(3) UL (ref 4–11)

## 2022-02-15 PROCEDURE — 3051F HG A1C>EQUAL 7.0%<8.0%: CPT | Performed by: FAMILY MEDICINE

## 2022-02-15 PROCEDURE — 82306 VITAMIN D 25 HYDROXY: CPT

## 2022-02-15 PROCEDURE — 84443 ASSAY THYROID STIM HORMONE: CPT

## 2022-02-15 PROCEDURE — 81003 URINALYSIS AUTO W/O SCOPE: CPT

## 2022-02-15 PROCEDURE — 83036 HEMOGLOBIN GLYCOSYLATED A1C: CPT

## 2022-02-15 PROCEDURE — 93005 ELECTROCARDIOGRAM TRACING: CPT

## 2022-02-15 PROCEDURE — 93010 ELECTROCARDIOGRAM REPORT: CPT | Performed by: FAMILY MEDICINE

## 2022-02-15 PROCEDURE — 36415 COLL VENOUS BLD VENIPUNCTURE: CPT

## 2022-02-15 PROCEDURE — 80053 COMPREHEN METABOLIC PANEL: CPT

## 2022-02-15 PROCEDURE — 84153 ASSAY OF PSA TOTAL: CPT

## 2022-02-15 PROCEDURE — 80061 LIPID PANEL: CPT

## 2022-02-15 PROCEDURE — 85025 COMPLETE CBC W/AUTO DIFF WBC: CPT

## 2022-02-15 RX ORDER — ERGOCALCIFEROL 1.25 MG/1
50000 CAPSULE ORAL WEEKLY
Qty: 12 CAPSULE | Refills: 4 | Status: SHIPPED | OUTPATIENT
Start: 2022-02-15 | End: 2022-03-17

## 2022-02-16 ENCOUNTER — TELEPHONE (OUTPATIENT)
Dept: FAMILY MEDICINE CLINIC | Facility: CLINIC | Age: 67
End: 2022-02-16

## 2022-02-16 NOTE — TELEPHONE ENCOUNTER
Normal results CBC, CMP, Lipids, A1c, TSH, PSA, EKG. Except low vit D.  Rx sent    Please inform patient

## 2022-02-16 NOTE — TELEPHONE ENCOUNTER
Phone call made s/t pt regarding normal CBC, CMP, Lipids, TSH, EKG. Dr Tuan Wills please advise on all other labs.

## 2022-02-18 NOTE — TELEPHONE ENCOUNTER
I spoke with patient verified name and . Per patient's request he needs a copy of results of February and EKG mailed to address on file. No further questions.

## 2022-02-18 NOTE — TELEPHONE ENCOUNTER
Patient advised of lab results. Reports script for Vit D weekly dose is $40, cost is too high. Reports he called insurance and he was given an alternative option to have it ordered instead of getting [facility]. Patient requesting lab results be mailed. Lab letter completed.

## 2022-02-22 ENCOUNTER — OFFICE VISIT (OUTPATIENT)
Dept: PHYSICAL MEDICINE AND REHAB | Facility: CLINIC | Age: 67
End: 2022-02-22
Payer: MEDICARE

## 2022-02-22 ENCOUNTER — TELEPHONE (OUTPATIENT)
Dept: PHYSICAL MEDICINE AND REHAB | Facility: CLINIC | Age: 67
End: 2022-02-22

## 2022-02-22 VITALS
BODY MASS INDEX: 40.66 KG/M2 | HEART RATE: 74 BPM | SYSTOLIC BLOOD PRESSURE: 120 MMHG | WEIGHT: 253 LBS | HEIGHT: 66 IN | DIASTOLIC BLOOD PRESSURE: 70 MMHG | OXYGEN SATURATION: 94 %

## 2022-02-22 DIAGNOSIS — M48.062 LUMBAR STENOSIS WITH NEUROGENIC CLAUDICATION: Primary | ICD-10-CM

## 2022-02-22 DIAGNOSIS — I83.812 VARICOSE VEINS OF LEFT LOWER EXTREMITY WITH PAIN: ICD-10-CM

## 2022-02-22 PROCEDURE — 3074F SYST BP LT 130 MM HG: CPT | Performed by: PHYSICAL MEDICINE & REHABILITATION

## 2022-02-22 PROCEDURE — 3008F BODY MASS INDEX DOCD: CPT | Performed by: PHYSICAL MEDICINE & REHABILITATION

## 2022-02-22 PROCEDURE — 99214 OFFICE O/P EST MOD 30 MIN: CPT | Performed by: PHYSICAL MEDICINE & REHABILITATION

## 2022-02-22 PROCEDURE — 3078F DIAST BP <80 MM HG: CPT | Performed by: PHYSICAL MEDICINE & REHABILITATION

## 2022-02-22 NOTE — PROGRESS NOTES
Progress note    C/C: low back and leg pain    HPI: 77year old male presents for follow up; visit conducted using Burkinan telephone . He underwent bilateral L5 TFESI under fluoroscopy on 1/24 and presents for follow up. Back pain improved, but leg pain still persists. He still has limited ability to tolerate standing and walking for more than 10 minutes, which has not changed since injection was done. He also reports swelling and pain along the lateral aspect of the right leg. Pertinent allergies: NKDA    Physical exam:  BMI 40.84  /70  HR 74  O2 sat 94%    Lumbar spine exam:    No pain with lumbar flexion. No pain with lumbar extension. 5/5 LE strength b/l  SILT b/l LE  2/4 quad, gastrocs reflexes b/l  Facet loading negative  Seated slump test negative  SLR results in right sided lower back pain, bilaterally. No provoked leg pain. Imaging: No new imaging to review    Assessment and plan  1. Lumbar stenosis with neurogenic claudication  2. History of DM    We discussed treatment options for persistent low back and leg pain despite physical therapy, multiple neuropathic medications (gabapentin, lyrica, duloxetine, amitriptyline) which includes repeating bilateral L5 TFESI under fluoroscopy, trial of acupuncture. Patient elects to proceed with repeat injection. Other option if he continues to have low back pain would be to consider spine surgery consult. We discussed the risks of the procedure, which include bleeding, infection, nerve injury, HA, transient increase in glucose. Patient elects to proceed. Follow up for injection.     Melba Zamudio DO  Physical Medicine and [address] [place] [address]

## 2022-02-22 NOTE — TELEPHONE ENCOUNTER
Initiated authorization for Bilateral L5 transforaminal epidural steroid injections CPT 43790-06+75124 dx:M48.062 to be done at [address] [place] [address] with uTaP online  Status: Approved valid 2/22/22-3/24/22  Authorization #[id number] (Tracking #[id number])  Does not require [id number]  Fluoroscopic guidance and localization of needle or catheter tip for spine or paraspinous diagnostic or therapeutic injection procedures (epidural or subarachnoid) (List separately in addition to code for primary procedure)

## 2022-02-22 NOTE — TELEPHONE ENCOUNTER
Patient has been scheduled for Bilateral L5 transforaminal epidural steroid injection on 03/21/22 at the [address] [place] [place] [address] with Dr. Geoff Luis. -Anesthesia type: LOCAL. -Patient informed not to eat or drink anything after midnight the night prior to the procedure, if being sedated. -Patient was advised that if he/she does receive the covid vaccine it needs to be at least 2 weeks before or after the injection. -Medications and allergies reviewed. -Patient reminded to hold NSAIDs (Ibuprofen, ASA, Aleve, Naproxen, Mobic etc.) for 3 days prior to [place]  if BMI is greater than 35. For Cervical injections only hold multivitamins, Vitamin E, Fish Oil, Phentermine (Lomaira) for 7 days prior to injection and NSAIDS. -If patient is receiving MAC/IVCS Phentermine Betty Fernanda) will need to be held for 7 days prior to injection.  -Patient informed he/she will need a  to and from procedure. -[facility] is located in the [facility] 1st floor. Patient may park in the yellow parking. Patient verbalized understanding and agrees with plan.  -----> Scheduled in Epic: Yes  -----> Scheduled in Casetabs:  Yes

## 2022-02-24 ENCOUNTER — MED REC SCAN ONLY (OUTPATIENT)
Dept: NEUROLOGY | Facility: CLINIC | Age: 67
End: 2022-02-24

## 2022-03-21 ENCOUNTER — OFFICE VISIT (OUTPATIENT)
Dept: SURGERY | Facility: CLINIC | Age: 67
End: 2022-03-21

## 2022-03-21 DIAGNOSIS — M48.062 LUMBAR STENOSIS WITH NEUROGENIC CLAUDICATION: Primary | ICD-10-CM

## 2022-03-21 PROCEDURE — 64483 NJX AA&/STRD TFRM EPI L/S 1: CPT | Performed by: PHYSICAL MEDICINE & REHABILITATION

## 2022-03-21 NOTE — PROCEDURES
[facility] [address]    BILATERAL LUMBAR TRANSFORAMINAL   NAME:  Marine Cheek    MR #:    [id number] :  10/27/1955     PHYSICIAN:  Carson Keita DO        Operative Report    DATE OF PROCEDURE: 3/21/2022   PREOPERATIVE DIAGNOSES: 1. Lumbar stenosis with neurogenic claudication        POSTOPERATIVE DIAGNOSES:   1. Lumbar stenosis with neurogenic claudication        PROCEDURES: Bilateral L5 transforaminal epidural steroid injections done under fluoroscopic guidance with contrast enhancement. SURGEON: Carson Keita DO   ANESTHESIA: Local   INDICATIONS: Bilateral leg pain, worse with walking     OPERATIVE PROCEDURE:  Written consent was obtained from the patient. The patient was brought into the operating room and placed in the prone position on the fluoroscopy table with pillow underneath her abdomen. The patient's skin was cleaned and draped in a normal sterile fashion. Using AP fluoroscopy, all five lumbar vertebrae were identified. When the fifth vertebra was identified, fluoroscopy was left anterior obliqued opening up the right L5-S1 intervertebral foramen. At this point in time, the patient's skin was anesthetized with 1% PF lidocaine without epinephrine. Then, a 5 inch, 22-gauge spinal needle was inserted and directed towards the right L5-S1 intervertebral foramen. When it felt to be in good position, AP fluoroscopy was used to advance the needle to the 6 o'clock position on the right L5 pedicle. At this point in time, Omnipaque-240 contrast was used to obtain a good epidurogram indicating correct needle placement. Then, aspiration was performed. No blood, fluid, or air was aspirated. Then, the patient was injected with a 3 cc solution of 1.5 cc of 6 mg/cc of celestone and 1.5 cc of 1% PF lidocaine without epinephrine. After this, the needle was removed. Then  fluoroscopy was right anterior obliqued opening up the left L5-S1 intervertebral foramen.   At this point in time, the patient's skin was anesthetized with 1 to 2 cc of 1% PF lidocaine without epinephrine. Then, a 5 inch, 22-gauge spinal needle was inserted and directed towards the left L5-S1 intervertebral foramen. When it felt to be in good position, AP fluoroscopy was used to advance the needle to the 6 o'clock position on the left L5 pedicle. At this point in time, Omnipaque-240 contrast was used to obtain a good epidurogram indicating correct needle placement. Then, aspiration was performed. No blood, fluid, or air was aspirated. Then, the patient was injected with a 3 cc solution of 1.5 cc of 6 mg/cc of celestone and 1.5 cc of 1% PF lidocaine without epinephrine. After this, the needle was removed. The patient's skin was cleaned. Band-Aids were applied. The patient was transferred to the cart and into [facility]. The patient was given discharge instructions and will follow up in the clinic as scheduled. Throughout the whole procedure, the patient's pulse oximetry and vital signs were monitored and they remained completely stable. Also, throughout the whole procedure, prior to injection of any medication, aspiration was performed. No blood, fluid, or air was aspirated at anytime.

## 2022-03-23 RX ORDER — INSULIN GLARGINE 100 [IU]/ML
30 INJECTION, SOLUTION SUBCUTANEOUS 2 TIMES DAILY
Qty: 15 ML | Refills: 5 | Status: SHIPPED | OUTPATIENT
Start: 2022-03-23 | End: 2022-07-07

## 2022-03-23 NOTE — TELEPHONE ENCOUNTER
Refill passed per [facility] [place] protocol.   Requested Prescriptions   Pending Prescriptions Disp Refills    LANTUS SOLOSTAR 100 UNIT/ML Subcutaneous Solution Pen-injector [Pharmacy Med Name: LANTUS SOLOSTAR PEN INJ 3ML] 15 mL 1     Sig: ADMINISTER 30 UNITS UNDER THE SKIN TWICE DAILY        Diabetes Medication Protocol Passed - 3/23/2022  5:41 PM        Passed - Last A1C < 7.5 and within past 6 months     Lab Results   Component Value Date    A1C 7.2 (H) 02/15/2022               Passed - Appointment in past 6 or next 3 months        Passed - GFR Non- > 50     Lab Results   Component Value Date    GFRNAA 96 02/15/2022                 Passed - GFR in the past 12 months             Recent Outpatient Visits              2 days ago Lumbar stenosis with neurogenic claudication    [facility] Racheal Jimenez DO    Office Visit    6 days ago Varicose veins of bilateral lower extremities with pain    Vascular - [address], [place]    Office Visit    4 weeks ago Lumbar stenosis with neurogenic claudication    [facility] Racheal Jimenez DO    Office Visit    1 month ago Medicare annual wellness visit, subsequent    [address] Wai Bowman MD    Office Visit    1 month ago Lumbar radiculopathy    [facility] Racheal Jimenez,     Office Visit           Future Appointments         Provider Department Appt Notes    In 1 month [place] [facility] V[facility] - [address], [place] **SAME DAY** US REFLUX BILATERAL    In 1 month MD [address], [place] **SAME DAY** 6 wk f/u compression use

## 2022-03-30 RX ORDER — AMITRIPTYLINE HYDROCHLORIDE 75 MG/1
TABLET, FILM COATED ORAL
Qty: 90 TABLET | Refills: 0 | Status: SHIPPED | OUTPATIENT
Start: 2022-03-30

## 2022-03-30 NOTE — TELEPHONE ENCOUNTER
Spoke with patient states he's currently taking at night time. Helpful with sleeping at night. Low back pain has been under controlled, only pain that's still the same is the N/T in the feet.

## 2022-05-25 RX ORDER — METOPROLOL SUCCINATE 25 MG/1
25 TABLET, EXTENDED RELEASE ORAL DAILY
Qty: 90 TABLET | Refills: 1 | Status: SHIPPED | OUTPATIENT
Start: 2022-05-25 | End: 2022-07-07

## 2022-05-26 NOTE — TELEPHONE ENCOUNTER
Refill passed per [facility] protocol.      Requested Prescriptions   Pending Prescriptions Disp Refills    METOPROLOL SUCCINATE ER 25 MG Oral Tablet 24 Hr [Pharmacy Med Name: METOPROLOL ER SUCCINATE 25MG TABS] 90 tablet 1     Sig: TAKE 1 TABLET(25 MG) BY MOUTH DAILY        Hypertensive Medications Protocol Passed - 5/25/2022  8:02 AM        Passed - CMP or BMP in past 12 months        Passed - Appointment in past 6 or next 3 months        Passed - GFR Non- > 50     Lab Results   Component Value Date    GFRNAA 96 02/15/2022                             Recent Outpatient Visits              2 months ago Lumbar stenosis with neurogenic claudication    [facility] [place] Luigi, DO    Office Visit    2 months ago Varicose veins of bilateral lower extremities with pain    Vascular - [address], [place], APN    Office Visit    3 months ago Lumbar stenosis with neurogenic claudication    [facility]    Office Visit    3 months ago Medicare annual wellness visit, subsequent    Wai Sam MD    Office Visit    4 months ago Lumbar radiculopathy    [facility] [place], [place]    Office Visit

## 2022-06-15 DIAGNOSIS — M54.16 LEFT LUMBAR RADICULITIS: ICD-10-CM

## 2022-06-20 RX ORDER — AMITRIPTYLINE HYDROCHLORIDE 75 MG/1
TABLET, FILM COATED ORAL
Qty: 90 TABLET | Refills: 0 | Status: SHIPPED | OUTPATIENT
Start: 2022-06-20

## 2022-06-20 NOTE — TELEPHONE ENCOUNTER
Refill Request    Medication request: AMITRIPTYLINE 75 MG Oral Tab    LOV:3/21/22 [facility] Mary Person DO   Due back to clinic per last office note:  Post injection  NOV: Visit date not found      [place]: 3[place]30/22 #90    Urine drug screen (if applicable): None  Pain contract: None    LOV plan (if weaning or changing medications): N/A.

## 2022-07-07 ENCOUNTER — OFFICE VISIT (OUTPATIENT)
Dept: FAMILY MEDICINE CLINIC | Facility: CLINIC | Age: 67
End: 2022-07-07
Payer: COMMERCIAL

## 2022-07-07 ENCOUNTER — LAB ENCOUNTER (OUTPATIENT)
Dept: LAB | Age: 67
End: 2022-07-07
Attending: FAMILY MEDICINE
Payer: MEDICARE

## 2022-07-07 VITALS
HEART RATE: 66 BPM | SYSTOLIC BLOOD PRESSURE: 128 MMHG | WEIGHT: 245 LBS | HEIGHT: 66 IN | BODY MASS INDEX: 39.37 KG/M2 | DIASTOLIC BLOOD PRESSURE: 77 MMHG

## 2022-07-07 DIAGNOSIS — Z79.4 TYPE 2 DIABETES MELLITUS WITHOUT COMPLICATION, WITH LONG-TERM CURRENT USE OF INSULIN (HCC): Primary | ICD-10-CM

## 2022-07-07 DIAGNOSIS — R35.1 BENIGN PROSTATIC HYPERPLASIA WITH NOCTURIA: ICD-10-CM

## 2022-07-07 DIAGNOSIS — Z79.4 TYPE 2 DIABETES MELLITUS WITHOUT COMPLICATION, WITH LONG-TERM CURRENT USE OF INSULIN (HCC): ICD-10-CM

## 2022-07-07 DIAGNOSIS — N40.1 BENIGN PROSTATIC HYPERPLASIA WITH NOCTURIA: ICD-10-CM

## 2022-07-07 DIAGNOSIS — E78.00 PURE HYPERCHOLESTEROLEMIA: ICD-10-CM

## 2022-07-07 DIAGNOSIS — E11.9 TYPE 2 DIABETES MELLITUS WITHOUT COMPLICATION, WITH LONG-TERM CURRENT USE OF INSULIN (HCC): ICD-10-CM

## 2022-07-07 DIAGNOSIS — I10 ESSENTIAL HYPERTENSION: ICD-10-CM

## 2022-07-07 DIAGNOSIS — E11.9 TYPE 2 DIABETES MELLITUS WITHOUT COMPLICATION, WITH LONG-TERM CURRENT USE OF INSULIN (HCC): Primary | ICD-10-CM

## 2022-07-07 LAB
CREAT UR-SCNC: 98.1 MG/DL
MICROALBUMIN UR-MCNC: 0.65 MG/DL
MICROALBUMIN/CREAT 24H UR-RTO: 6.6 UG/MG (ref ?–30)

## 2022-07-07 PROCEDURE — 3061F NEG MICROALBUMINURIA REV: CPT | Performed by: FAMILY MEDICINE

## 2022-07-07 PROCEDURE — 82570 ASSAY OF URINE CREATININE: CPT

## 2022-07-07 PROCEDURE — 3074F SYST BP LT 130 MM HG: CPT | Performed by: FAMILY MEDICINE

## 2022-07-07 PROCEDURE — 3078F DIAST BP <80 MM HG: CPT | Performed by: FAMILY MEDICINE

## 2022-07-07 PROCEDURE — 3008F BODY MASS INDEX DOCD: CPT | Performed by: FAMILY MEDICINE

## 2022-07-07 PROCEDURE — 99214 OFFICE O/P EST MOD 30 MIN: CPT | Performed by: FAMILY MEDICINE

## 2022-07-07 PROCEDURE — 82043 UR ALBUMIN QUANTITATIVE: CPT

## 2022-07-07 RX ORDER — EMPAGLIFLOZIN, METFORMIN HYDROCHLORIDE 25; 1000 MG/1; MG/1
1 TABLET, EXTENDED RELEASE ORAL DAILY
COMMUNITY
Start: 2022-02-22 | End: 2022-07-07

## 2022-07-07 RX ORDER — INSULIN ASPART 100 [IU]/ML
6 INJECTION, SOLUTION INTRAVENOUS; SUBCUTANEOUS
Qty: 5 EACH | Refills: 3 | Status: SHIPPED | OUTPATIENT
Start: 2022-07-07

## 2022-07-07 RX ORDER — TAMSULOSIN HYDROCHLORIDE 0.4 MG/1
CAPSULE ORAL
Qty: 90 CAPSULE | Refills: 2 | Status: SHIPPED | OUTPATIENT
Start: 2022-07-07

## 2022-07-07 RX ORDER — DAPAGLIFLOZIN AND METFORMIN HYDROCHLORIDE 5; 1000 MG/1; MG/1
1 TABLET, FILM COATED, EXTENDED RELEASE ORAL 2 TIMES DAILY
Qty: 180 TABLET | Refills: 1 | Status: SHIPPED | OUTPATIENT
Start: 2022-07-07

## 2022-07-07 RX ORDER — DULAGLUTIDE 1.5 MG/.5ML
1.5 INJECTION, SOLUTION SUBCUTANEOUS WEEKLY
Qty: 12 EACH | Refills: 1 | Status: SHIPPED | OUTPATIENT
Start: 2022-07-07

## 2022-07-07 RX ORDER — INSULIN GLARGINE 100 [IU]/ML
INJECTION, SOLUTION SUBCUTANEOUS
Qty: 15 ML | Refills: 5 | Status: SHIPPED | OUTPATIENT
Start: 2022-07-07

## 2022-07-07 RX ORDER — ATORVASTATIN CALCIUM 40 MG/1
40 TABLET, FILM COATED ORAL NIGHTLY
Qty: 90 TABLET | Refills: 1 | Status: SHIPPED | OUTPATIENT
Start: 2022-07-07

## 2022-07-07 RX ORDER — METOPROLOL SUCCINATE 25 MG/1
25 TABLET, EXTENDED RELEASE ORAL DAILY
Qty: 90 TABLET | Refills: 1 | Status: SHIPPED | OUTPATIENT
Start: 2022-07-07

## 2022-07-07 RX ORDER — LOSARTAN POTASSIUM 25 MG/1
25 TABLET ORAL DAILY
Qty: 90 TABLET | Refills: 1 | Status: SHIPPED | OUTPATIENT
Start: 2022-07-07

## 2022-07-26 ENCOUNTER — TELEPHONE (OUTPATIENT)
Dept: FAMILY MEDICINE CLINIC | Facility: CLINIC | Age: 67
End: 2022-07-26

## 2022-07-26 NOTE — TELEPHONE ENCOUNTER
Patient has a placard form that he will be dropping off tomorrow at the [address] [place] [address] office . Please call when ready for  with  at [phone].

## 2022-08-01 NOTE — TELEPHONE ENCOUNTER
Pt calling to f/up on getting handicapp placard form and form to give clearance to drive completed. Pt wants to know when should he pick it up?

## 2022-09-03 ENCOUNTER — TELEPHONE (OUTPATIENT)
Dept: FAMILY MEDICINE CLINIC | Facility: CLINIC | Age: 67
End: 2022-09-03

## 2022-09-03 NOTE — TELEPHONE ENCOUNTER
Per pharmacy, PA needed for the following medication:    Novolog Flexpen 100 unit/ml    Key: [id number]  Last name: Arcadio Rowe  : 10/27/1955

## 2022-09-09 NOTE — TELEPHONE ENCOUNTER
Prior Auth denied for Aspart (Novolog)  Insulin due insurance preference and medication tier coverage. [employer] will cover Lispro (Humalog). Message routed  Dr. Lulú Fregoso for further instructions. 9/12/22  Called [facility] confirm rx was received and covered. Patient was informed of rx being sent and ready for this afternoon.

## 2022-09-10 RX ORDER — INSULIN LISPRO PROTAMIN/LISPRO 75-25/ML
VIAL (ML) SUBCUTANEOUS
Qty: 6 EACH | Refills: 0 | Status: SHIPPED | OUTPATIENT
Start: 2022-09-10

## 2022-09-12 ENCOUNTER — TELEPHONE (OUTPATIENT)
Dept: FAMILY MEDICINE CLINIC | Facility: CLINIC | Age: 67
End: 2022-09-12

## 2022-09-12 DIAGNOSIS — E11.65 UNCONTROLLED TYPE 2 DIABETES MELLITUS WITH HYPERGLYCEMIA (HCC): ICD-10-CM

## 2022-09-12 RX ORDER — BLOOD SUGAR DIAGNOSTIC
STRIP MISCELLANEOUS
Refills: 0 | OUTPATIENT
Start: 2022-09-12

## 2022-09-12 RX ORDER — LANCETS
1 EACH MISCELLANEOUS 2 TIMES DAILY
Qty: 200 EACH | Refills: 0 | Status: SHIPPED | OUTPATIENT
Start: 2022-09-12

## 2022-09-12 RX ORDER — BLOOD SUGAR DIAGNOSTIC
1 STRIP MISCELLANEOUS 2 TIMES DAILY
Qty: 100 STRIP | Refills: 0 | Status: SHIPPED | OUTPATIENT
Start: 2022-09-12

## 2022-09-12 NOTE — TELEPHONE ENCOUNTER
Called patient to introduce my service as the Diabetes Patient Navigator. Informed patient I will be available for any barriers he might encounter during his care. Patient was pleased and received my direct number for future needs. Asked if there was anything I can help with and he express the need for a refill on his glucose test supplies. Message and pend orders sent to Dr. Ayo Moreno.

## 2022-09-12 NOTE — TELEPHONE ENCOUNTER
Rx: [address] Message: A prior authorization for has been started for yo for the above medication. Here is all you need to do to submit the PA:    1. Open- CoverMyMeds using [email or web address] and enter your credentials to login. 2. Click- \"Enter a Key\"    3. Enter-   KEY: [id number]  PATIENT LAST NAME: Ethel Crouch YOB: 1955    4. Complete- The request and click \"Send to Plan\", call the plan, or mail the plan where appropriate.

## 2022-09-13 DIAGNOSIS — M54.16 LEFT LUMBAR RADICULITIS: ICD-10-CM

## 2022-09-14 RX ORDER — AMITRIPTYLINE HYDROCHLORIDE 75 MG/1
TABLET ORAL
Qty: 90 TABLET | Refills: 0 | OUTPATIENT
Start: 2022-09-14

## 2022-09-14 NOTE — TELEPHONE ENCOUNTER
Refill Request    Medication request: AMITRIPTYLINE 75 MG Oral Tab    LOV:3/21/22 [facility] Sara Ramirez DO   Due back to clinic per last office note:  Post injection  NOV: Visit date not found      [place]: 8/8/22 #90    Urine drug screen (if applicable): None  Pain contract: None    LOV plan (if weaning or changing medications): N/A        Medication denied patient recvd 90D supply on 8/8/22

## 2022-10-11 RX ORDER — PEN NEEDLE, DIABETIC 31 GX5/16"
NEEDLE, DISPOSABLE MISCELLANEOUS
Qty: 400 EACH | Refills: 3 | Status: SHIPPED | OUTPATIENT
Start: 2022-10-11

## 2022-10-11 NOTE — TELEPHONE ENCOUNTER
Refill passed per [facility] protocol.   Requested Prescriptions   Pending Prescriptions Disp Refills    Insulin Pen Needle (TECHLITE PEN NEEDLES) 31G X 8 MM Does not apply Misc 400 each 3     Sig: Use 4 times daily as directed        Diabetic Supplies Protocol Passed - 10/10/2022  4:58 PM        Passed - In person appointment or virtual visit in the past 12 mos or appointment in next 3 mos       Recent Outpatient Visits              3 months ago Type 2 diabetes mellitus without complication, with long-term current use of insulin [facility])    [place] [email or web address] [facility], [address], Wai Chavez MD    Office Visit    6 months ago Lumbar stenosis with neurogenic claudication    [facility] [place], DO    Office Visit    6 months ago Varicose veins of bilateral lower extremities with pain    Vascular - [address], MARICEL Martini    Office Visit    7 months ago Lumbar stenosis with neurogenic claudication    [facility] [place], DO    Office Visit    7 months ago Medicare annual wellness visit, subsequent    [address] Adonis Bowman MD    Office Visit                       Recent Outpatient Visits              3 months ago Type 2 diabetes mellitus without complication, with long-term current use of insulin [facility])    [place] [employer] [facility], [address], Wai Chavez MD    Office Visit    6 months ago Lumbar stenosis with neurogenic claudication    [facility] [place], DO    Office Visit    6 months ago Varicose veins of bilateral lower extremities with pain    Vascular - [address], MARICEL Martini    Office Visit    7 months ago Lumbar stenosis with neurogenic claudication    [facility] [place], DO    Office Visit    7 months ago Medicare annual wellness visit, subsequent    [facility] Americo Carrillo, Wai Joyce MD    Office Visit

## 2022-10-13 RX ORDER — INSULIN LISPRO 100 [IU]/ML
INJECTION, SUSPENSION SUBCUTANEOUS
Qty: 18 ML | Refills: 1 | Status: SHIPPED | OUTPATIENT
Start: 2022-10-13

## 2022-10-13 NOTE — TELEPHONE ENCOUNTER
Please review. Protocol failed/ No protocol.     Requested Prescriptions   Pending Prescriptions Disp Refills    HUMALOG MIX 75/25 KWIKPEN (75-25) 100 UNIT/ML Subcutaneous Suspension Pen-injector [Pharmacy Med Name: HUMALOG MIX 75/25 KWIKPEN INJ 3ML] 18 mL 0     Sig: TAKE 32 UNITS EVERY MORNING AND 28 UNITS EVERY EVENING        Diabetes Medication Protocol Failed - 10/12/2022 12:29 PM        Failed - Last A1C < 7.5 and within past 6 months     Lab Results   Component Value Date    A1C 7.2 (H) 02/15/2022               Passed - In person appointment or virtual visit in the past 6 mos or appointment in next 3 mos       Recent Outpatient Visits              3 months ago Type 2 diabetes mellitus without complication, with long-term current use of insulin [facility])    [place] [employer] [facility], Wai Smallwood MD    Office Visit    6 months ago Lumbar stenosis with neurogenic claudication    [facility] Hardik Albarado DO    Office Visit    7 months ago Varicose veins of bilateral lower extremities with pain    Vascular - [address], [place] MARICEL Morrow    Office Visit    7 months ago Lumbar stenosis with neurogenic claudication    [facility]    Office Visit    8 months ago Medicare annual wellness visit, subsequent    [address] Wai Bowman MD    Office Visit                 Passed - EGFRCR or GFRNAA > 50     GFR Evaluation  GFRNAA: 96 , resulted on 2/15/2022            Passed - GFR in the past 12 months              Recent Outpatient Visits              3 months ago Type 2 diabetes mellitus without complication, with long-term current use of insulin [facility])    [place], Wai Smallwood MD    Office Visit    6 months ago Lumbar stenosis with neurogenic claudication    [facility] Hardik Albarado DO    Office Visit    7 months ago Varicose veins of bilateral lower extremities with pain    Vascular - [address], [place]    Office Visit    7 months ago Lumbar stenosis with neurogenic claudication    [facility] Tere Cortes DO    Office Visit    8 months ago Medicare annual wellness visit, subsequent    Lynda Micheline, Angela Jacobo MD    Office Visit

## 2022-10-28 DIAGNOSIS — E11.65 UNCONTROLLED TYPE 2 DIABETES MELLITUS WITH HYPERGLYCEMIA (HCC): ICD-10-CM

## 2022-10-31 RX ORDER — BLOOD SUGAR DIAGNOSTIC
STRIP MISCELLANEOUS
Qty: 100 STRIP | Refills: 0 | Status: SHIPPED | OUTPATIENT
Start: 2022-10-31

## 2022-11-01 ENCOUNTER — OFFICE VISIT (OUTPATIENT)
Dept: FAMILY MEDICINE CLINIC | Facility: CLINIC | Age: 67
End: 2022-11-01
Payer: COMMERCIAL

## 2022-11-01 VITALS
BODY MASS INDEX: 41.66 KG/M2 | WEIGHT: 259.19 LBS | DIASTOLIC BLOOD PRESSURE: 72 MMHG | SYSTOLIC BLOOD PRESSURE: 118 MMHG | HEART RATE: 80 BPM | HEIGHT: 66 IN

## 2022-11-01 DIAGNOSIS — E11.9 TYPE 2 DIABETES MELLITUS WITHOUT COMPLICATION, WITH LONG-TERM CURRENT USE OF INSULIN (HCC): Primary | ICD-10-CM

## 2022-11-01 DIAGNOSIS — Z79.4 TYPE 2 DIABETES MELLITUS WITHOUT COMPLICATION, WITH LONG-TERM CURRENT USE OF INSULIN (HCC): Primary | ICD-10-CM

## 2022-11-01 DIAGNOSIS — I10 ESSENTIAL HYPERTENSION: ICD-10-CM

## 2022-11-01 LAB
CARTRIDGE LOT#: ABNORMAL NUMERIC
HEMOGLOBIN A1C: 8.1 % (ref 4.3–5.6)

## 2022-11-01 PROCEDURE — 3074F SYST BP LT 130 MM HG: CPT | Performed by: FAMILY MEDICINE

## 2022-11-01 PROCEDURE — 1126F AMNT PAIN NOTED NONE PRSNT: CPT | Performed by: FAMILY MEDICINE

## 2022-11-01 PROCEDURE — 3078F DIAST BP <80 MM HG: CPT | Performed by: FAMILY MEDICINE

## 2022-11-01 PROCEDURE — 3008F BODY MASS INDEX DOCD: CPT | Performed by: FAMILY MEDICINE

## 2022-11-01 PROCEDURE — 99213 OFFICE O/P EST LOW 20 MIN: CPT | Performed by: FAMILY MEDICINE

## 2022-11-01 PROCEDURE — 3052F HG A1C>EQUAL 8.0%<EQUAL 9.0%: CPT | Performed by: FAMILY MEDICINE

## 2022-11-01 PROCEDURE — 83036 HEMOGLOBIN GLYCOSYLATED A1C: CPT | Performed by: FAMILY MEDICINE

## 2022-11-01 RX ORDER — INSULIN LISPRO 100 [IU]/ML
INJECTION, SUSPENSION SUBCUTANEOUS
Qty: 18 ML | Refills: 1 | Status: SHIPPED | OUTPATIENT
Start: 2022-11-01

## 2022-11-01 RX ORDER — CELECOXIB 200 MG/1
200 CAPSULE ORAL DAILY
Qty: 90 CAPSULE | Refills: 1 | Status: SHIPPED | OUTPATIENT
Start: 2022-11-01

## 2022-11-03 ENCOUNTER — TELEPHONE (OUTPATIENT)
Dept: FAMILY MEDICINE CLINIC | Facility: CLINIC | Age: 67
End: 2022-11-03

## 2022-11-10 ENCOUNTER — TELEPHONE (OUTPATIENT)
Dept: NEUROLOGY | Facility: CLINIC | Age: 67
End: 2022-11-10

## 2022-11-10 ENCOUNTER — OFFICE VISIT (OUTPATIENT)
Dept: PHYSICAL MEDICINE AND REHAB | Facility: CLINIC | Age: 67
End: 2022-11-10
Payer: COMMERCIAL

## 2022-11-10 ENCOUNTER — LAB ENCOUNTER (OUTPATIENT)
Dept: LAB | Age: 67
End: 2022-11-10
Attending: UROLOGY
Payer: MEDICARE

## 2022-11-10 VITALS
BODY MASS INDEX: 41.17 KG/M2 | OXYGEN SATURATION: 99 % | SYSTOLIC BLOOD PRESSURE: 134 MMHG | HEART RATE: 88 BPM | HEIGHT: 66 IN | DIASTOLIC BLOOD PRESSURE: 62 MMHG | WEIGHT: 256.19 LBS

## 2022-11-10 DIAGNOSIS — N40.1 BENIGN LOCALIZED HYPERPLASIA OF PROSTATE WITH URINARY OBSTRUCTION: Primary | ICD-10-CM

## 2022-11-10 DIAGNOSIS — M48.062 LUMBAR STENOSIS WITH NEUROGENIC CLAUDICATION: Primary | ICD-10-CM

## 2022-11-10 DIAGNOSIS — N13.8 BENIGN LOCALIZED HYPERPLASIA OF PROSTATE WITH URINARY OBSTRUCTION: Primary | ICD-10-CM

## 2022-11-10 LAB
PSA FREE MFR SERPL: 32 %
PSA FREE SERPL-MCNC: 0.26 NG/ML
PSA SERPL-MCNC: 0.81 NG/ML (ref ?–4)

## 2022-11-10 PROCEDURE — 3075F SYST BP GE 130 - 139MM HG: CPT | Performed by: PHYSICAL MEDICINE & REHABILITATION

## 2022-11-10 PROCEDURE — 84154 ASSAY OF PSA FREE: CPT

## 2022-11-10 PROCEDURE — 3078F DIAST BP <80 MM HG: CPT | Performed by: PHYSICAL MEDICINE & REHABILITATION

## 2022-11-10 PROCEDURE — 99214 OFFICE O/P EST MOD 30 MIN: CPT | Performed by: PHYSICAL MEDICINE & REHABILITATION

## 2022-11-10 PROCEDURE — 3008F BODY MASS INDEX DOCD: CPT | Performed by: PHYSICAL MEDICINE & REHABILITATION

## 2022-11-10 PROCEDURE — 36415 COLL VENOUS BLD VENIPUNCTURE: CPT

## 2022-11-10 PROCEDURE — 84153 ASSAY OF PSA TOTAL: CPT

## 2022-11-10 RX ORDER — HYDROCODONE BITARTRATE AND ACETAMINOPHEN 5; 325 MG/1; MG/1
1 TABLET ORAL 2 TIMES DAILY PRN
Qty: 14 TABLET | Refills: 0 | Status: SHIPPED | OUTPATIENT
Start: 2022-11-10

## 2022-11-10 NOTE — TELEPHONE ENCOUNTER
This [employer] Advant[employer] members plan does not currently require a prior authorization for these services Bilateral L5 transforaminal epidural steroid injection under fluoroscopy cpt codes 68104-20, 77628 Decision ID #:[id number], Will inform Nursing.

## 2022-11-11 NOTE — TELEPHONE ENCOUNTER
Patient has been scheduled for Bilateral L5 transforaminal epidural steroid injection under fluoroscopy  on 12/5/22 at the [facility] with Dr. Evan Johnson. -Anesthesia type: Local.  -Patient informed to fast 12 hours prior to procedure with IVCS/MAC.   -Scheduling [facility] covid testing required for all procedures whether patient is vaccinated or not. -Patient was advised that if he/she does receive the covid vaccine it needs to be at least 2 weeks before or after the injection. -Medications and allergies reviewed. -Patient reminded to hold NSAIDs (Ibuprofen, ASA 81, Aleve, Naproxen, Mobic, Diclofenac, Etodolac, Celebrex etc.) for 3 days prior to Lumbar MBB/Facet if BMI is greater than 35. For Cervical injections only hold multivitamins, Vitamin E, Fish Oil, Phentermine/Lomaira for 7 days prior to injection and NSAIDS.  mg to be held for 7 days prior to injections.  -If patient is receiving MAC/IVCS Phentermine Berkley Cuna) will need to be held for 7 days prior to injection.  -If on blood thinner clearance has been received to hold this medication by provider.   -Patient informed he/she will need a  to and from procedure. [facility] is located in the [facility] [address] 1st floor,  may park in the yellow/purple parking lot. Patient verbalized understanding and agrees with plan.  -----> Scheduled in Epic: Yes  -----> Scheduled in Caset[employer]:  Yes

## 2022-12-05 ENCOUNTER — OFFICE VISIT (OUTPATIENT)
Dept: SURGERY | Facility: CLINIC | Age: 67
End: 2022-12-05
Payer: COMMERCIAL

## 2022-12-05 DIAGNOSIS — M48.062 LUMBAR STENOSIS WITH NEUROGENIC CLAUDICATION: Primary | ICD-10-CM

## 2022-12-05 DIAGNOSIS — M54.16 LUMBAR RADICULOPATHY: ICD-10-CM

## 2022-12-05 PROCEDURE — 64483 NJX AA&/STRD TFRM EPI L/S 1: CPT | Performed by: PHYSICAL MEDICINE & REHABILITATION

## 2022-12-05 RX ORDER — HYDROCODONE BITARTRATE AND ACETAMINOPHEN 5; 325 MG/1; MG/1
1 TABLET ORAL 2 TIMES DAILY PRN
Qty: 14 TABLET | Refills: 0 | Status: SHIPPED | OUTPATIENT
Start: 2022-12-05

## 2022-12-05 NOTE — PROCEDURES
[facility]    BILATERAL LUMBAR TRANSFORAMINAL   NAME:  Job Ness    MR #:    [id number] :  10/27/1955     PHYSICIAN:  Danica Tineo DO        Operative Report    DATE OF PROCEDURE: 2022   PREOPERATIVE DIAGNOSES: 1. Lumbar stenosis with neurogenic claudication    2. Lumbar radiculopathy        POSTOPERATIVE DIAGNOSES:   1. Lumbar stenosis with neurogenic claudication    2. Lumbar radiculopathy        PROCEDURES: Bilateral L5 transforaminal epidural steroid injections done under fluoroscopic guidance with contrast enhancement. SURGEON: Danica Tineo DO   ANESTHESIA: Local   INDICATIONS: Bilateral lower back and leg pain     OPERATIVE PROCEDURE:  Written consent was obtained from the patient. The patient was brought into the operating room and placed in the prone position on the fluoroscopy table with pillow underneath her abdomen. The patient's skin was cleaned and draped in a normal sterile fashion. Using AP fluoroscopy, all five lumbar vertebrae were identified. When the fifth vertebra was identified, fluoroscopy was left anterior obliqued opening up the right L5-S1 intervertebral foramen. At this point in time, the patient's skin was anesthetized with 1% PF lidocaine without epinephrine. Then, a 5 inch, 22-gauge spinal needle was inserted and directed towards the right L5-S1 intervertebral foramen. When it felt to be in good position, AP fluoroscopy was used to advance the needle to the 6 o'clock position on the right L5 pedicle. At this point in time, Omnipaque-240 contrast was used to obtain a good epidurogram indicating correct needle placement. Then, aspiration was performed. No blood, fluid, or air was aspirated. Then, the patient was injected with a 3 cc solution of 1.5 cc of 6 mg/cc of celestone and 1.5 cc of 1% PF lidocaine without epinephrine. After this, the needle was removed.   Then  fluoroscopy was right anterior obliqued opening up the left L5-S1 intervertebral foramen. At this point in time, the patient's skin was anesthetized with 1 to 2 cc of 1% PF lidocaine without epinephrine. Then, a 5 inch, 22-gauge spinal needle was inserted and directed towards the left L5-S1 intervertebral foramen. When it felt to be in good position, AP fluoroscopy was used to advance the needle to the 6 o'clock position on the left L5 pedicle. At this point in time, Omnipaque-240 contrast was used to obtain a good epidurogram indicating correct needle placement. Then, aspiration was performed. No blood, fluid, or air was aspirated. Then, the patient was injected with a 3 cc solution of 1.5 cc of 6 mg/cc of celestone and 1.5 cc of 1% PF lidocaine without epinephrine. After this, the needle was removed. The patient's skin was cleaned. Band-Aids were applied. The patient was transferred to the cart and into [facility]. The patient was given discharge instructions and will follow up in the clinic as scheduled. Throughout the whole procedure, the patient's pulse oximetry and vital signs were monitored and they remained completely stable. Also, throughout the whole procedure, prior to injection of any medication, aspiration was performed. No blood, fluid, or air was aspirated at anytime.

## 2022-12-09 DIAGNOSIS — M54.16 LEFT LUMBAR RADICULITIS: ICD-10-CM

## 2022-12-12 RX ORDER — AMITRIPTYLINE HYDROCHLORIDE 75 MG/1
75 TABLET, FILM COATED ORAL NIGHTLY
Qty: 90 TABLET | Refills: 0 | Status: SHIPPED | OUTPATIENT
Start: 2022-12-12

## 2022-12-14 RX ORDER — DULAGLUTIDE 1.5 MG/.5ML
INJECTION, SOLUTION SUBCUTANEOUS
Qty: 6 ML | Refills: 0 | Status: SHIPPED | OUTPATIENT
Start: 2022-12-14

## 2022-12-16 DIAGNOSIS — E11.65 UNCONTROLLED TYPE 2 DIABETES MELLITUS WITH HYPERGLYCEMIA (HCC): ICD-10-CM

## 2022-12-19 DIAGNOSIS — E11.65 UNCONTROLLED TYPE 2 DIABETES MELLITUS WITH HYPERGLYCEMIA (HCC): ICD-10-CM

## 2022-12-19 RX ORDER — BLOOD SUGAR DIAGNOSTIC
STRIP MISCELLANEOUS
Qty: 100 STRIP | Refills: 0 | Status: SHIPPED | OUTPATIENT
Start: 2022-12-19

## 2022-12-20 RX ORDER — LANCETS
EACH MISCELLANEOUS
Qty: 200 EACH | Refills: 3 | Status: SHIPPED | OUTPATIENT
Start: 2022-12-20

## 2022-12-20 RX ORDER — INSULIN LISPRO 100 [IU]/ML
INJECTION, SUSPENSION SUBCUTANEOUS
Qty: 18 ML | Refills: 1 | OUTPATIENT
Start: 2022-12-20

## 2022-12-20 NOTE — TELEPHONE ENCOUNTER
Refill passed per [facility] protocol.   Requested Prescriptions   Pending Prescriptions Disp Refills    ACCU-CHEK SOFTCLIX LANCETS Does not apply Misc [Pharmacy Med Name: SOFTCLIX LANCETS] 200 each 0     Sig: USE TWICE DAILY       There is no refill protocol information for this order       HUMALOG MIX 75/25 KWIKPEN (75-25) 100 UNIT/ML Subcutaneous Suspension Pen-injector [Pharmacy Med Name: HUMALOG MIX 75/25 KWIKPEN INJ 3ML] 18 mL 1     Sig: TAKE 36 UNITS EVERY MORNING AND 30 UNITS EVERY EVENING       There is no refill protocol information for this order

## 2022-12-30 RX ORDER — LOSARTAN POTASSIUM 25 MG/1
25 TABLET ORAL DAILY
Qty: 90 TABLET | Refills: 1 | Status: SHIPPED | OUTPATIENT
Start: 2022-12-30

## 2022-12-30 RX ORDER — ATORVASTATIN CALCIUM 40 MG/1
40 TABLET, FILM COATED ORAL NIGHTLY
Qty: 90 TABLET | Refills: 1 | Status: SHIPPED | OUTPATIENT
Start: 2022-12-30

## 2023-01-23 RX ORDER — INSULIN LISPRO 100 [IU]/ML
INJECTION, SUSPENSION SUBCUTANEOUS
Qty: 57 ML | Refills: 0 | OUTPATIENT
Start: 2023-01-23

## 2023-01-26 RX ORDER — INSULIN LISPRO 100 [IU]/ML
INJECTION, SUSPENSION SUBCUTANEOUS
Qty: 18 ML | Refills: 1 | Status: SHIPPED | OUTPATIENT
Start: 2023-01-26

## 2023-01-27 NOTE — TELEPHONE ENCOUNTER
Per OV note 11/1/22 =      Insulin Lispro Prot & Lispro (HUMALOG MIX 75/25 KWIKPEN) (75-25) 100 UNIT/ML SC SUPN 18 mL 1       Sig: TAKE 36 UNITS EVERY MORNING AND 30 UNITS EVERY EVENING         Refill passed per [facility], [facility] protocol.      Requested Prescriptions   Pending Prescriptions Disp Refills    Insulin Lispro Prot & Lispro (HUMALOG MIX 75/25 KWIKPEN) (75-25) 100 UNIT/ML SC SUPN 18 mL 1     Sig: TAKE 36 UNITS EVERY MORNING AND 30 UNITS EVERY EVENING       Diabetes Medication Protocol Failed - 1/23/2023  4:01 PM        Failed - Last A1C < 7.5 and within past 6 months     Lab Results   Component Value Date    A1C 8.1 (A) 11/01/2022             Passed - In person appointment or virtual visit in the past 6 mos or appointment in next 3 mos     Recent Outpatient Visits              1 month ago Lumbar stenosis with neurogenic claudication    [facility] Silver Magda, DO    Office Visit    2 months ago Lumbar stenosis with neurogenic claudication    [facility], [address] [facility],[address], Rahul Toth, DO    Office Visit    2 months ago Type 2 diabetes mellitus without complication, with long-term current use of insulin [facility])    [address], [place] Bocanegra, Lona Phoenix, MD    Office Visit    6 months ago Type 2 diabetes mellitus without complication, with long-term current use of insulin [facility])    [address], [place] Bocanegra, Lona Phoenix, MD    Office Visit    10 months ago Lumbar stenosis with neurogenic claudication    [facility] Silver Magda, DO    Office Visit          Future Appointments         Provider Department Appt Notes    In 2 weeks Julio C Watson MD [address] [facility], [facility] VISIT- f/u 3 months               Passed - EGFRCR or GFRNAA > 50     GFR Evaluation  GFRNAA: 96 , resulted on 2/15/2022          Passed - GFR in the past 12 months              Recent Outpatient Visits              1 month ago Lumbar stenosis with neurogenic claudication    [facility] Angelique Padron, DO    Office Visit    2 months ago Lumbar stenosis with neurogenic claudication    [facility], [address], [place], [place]    Office Visit    2 months ago Type 2 diabetes mellitus without complication, with long-term current use of insulin [facility])    [address], [place] Boc[place]ra, Catherine Delaney MD    Office Visit    6 months ago Type 2 diabetes mellitus without complication, with long-term current use of insulin [facility])    [address], [place] Boc[place], Catherine Delaney MD    Office Visit    10 months ago Lumbar stenosis with neurogenic claudication    [facility] Tobias Menard, DO    Office Visit             Future Appointments         Provider Department Appt Notes    In 2 weeks Marialuisa Hoffman MD [address] [facility], [facility] VISIT- f/u 3 months

## 2023-02-07 DIAGNOSIS — E11.65 UNCONTROLLED TYPE 2 DIABETES MELLITUS WITH HYPERGLYCEMIA (HCC): ICD-10-CM

## 2023-02-08 RX ORDER — BLOOD SUGAR DIAGNOSTIC
STRIP MISCELLANEOUS
Qty: 200 STRIP | Refills: 1 | Status: SHIPPED | OUTPATIENT
Start: 2023-02-08

## 2023-02-08 NOTE — TELEPHONE ENCOUNTER
Refill passed per [facility], [facility] protocol.      Requested Prescriptions   Pending Prescriptions Disp Refills    ACCU-CHEK DAXA PLUS In Vitro Strip [Pharmacy Med Name: [address]] 100 strip 0     Sig: TEST TWICE DAILY       There is no refill protocol information for this order           Recent Outpatient Visits              2 months ago Lumbar stenosis with neurogenic claudication    [facility] Kanchan Schmidt,     Office Visit    3 months ago Lumbar stenosis with neurogenic claudication    [facility], [address], Rahul Toth,     Office Visit    3 months ago Type 2 diabetes mellitus without complication, with long-term current use of insulin [facility])    David Jacobs, Roselyn Habermann, MD    Office Visit    7 months ago Type 2 diabetes mellitus without complication, with long-term current use of insulin [facility])    David Jacobs, Roselyn Habermann, MD    Office Visit    10 months ago Lumbar stenosis with neurogenic claudication    [facility] Kanchan Schmidt DO    Office Visit             Future Appointments         Provider Department Appt Notes    In 5 days Mario Boyd MD [address] [facility], [facility] VISIT- f/u 3 months

## 2023-02-13 ENCOUNTER — OFFICE VISIT (OUTPATIENT)
Dept: FAMILY MEDICINE CLINIC | Facility: CLINIC | Age: 68
End: 2023-02-13

## 2023-02-13 VITALS
HEART RATE: 67 BPM | HEIGHT: 66 IN | SYSTOLIC BLOOD PRESSURE: 134 MMHG | BODY MASS INDEX: 41 KG/M2 | DIASTOLIC BLOOD PRESSURE: 69 MMHG

## 2023-02-13 DIAGNOSIS — K59.01 SLOW TRANSIT CONSTIPATION: ICD-10-CM

## 2023-02-13 DIAGNOSIS — N40.1 BENIGN PROSTATIC HYPERPLASIA WITH NOCTURIA: ICD-10-CM

## 2023-02-13 DIAGNOSIS — E78.00 PURE HYPERCHOLESTEROLEMIA: ICD-10-CM

## 2023-02-13 DIAGNOSIS — E55.9 HYPOVITAMINOSIS D: ICD-10-CM

## 2023-02-13 DIAGNOSIS — Z00.00 MEDICARE ANNUAL WELLNESS VISIT, SUBSEQUENT: Primary | ICD-10-CM

## 2023-02-13 DIAGNOSIS — I10 ESSENTIAL HYPERTENSION: ICD-10-CM

## 2023-02-13 DIAGNOSIS — E11.9 TYPE 2 DIABETES MELLITUS WITHOUT COMPLICATION, WITH LONG-TERM CURRENT USE OF INSULIN (HCC): ICD-10-CM

## 2023-02-13 DIAGNOSIS — Z12.11 COLON CANCER SCREENING: ICD-10-CM

## 2023-02-13 DIAGNOSIS — R35.1 NOCTURIA: ICD-10-CM

## 2023-02-13 DIAGNOSIS — M54.16 LEFT LUMBAR RADICULITIS: ICD-10-CM

## 2023-02-13 DIAGNOSIS — M43.16 SPONDYLOLISTHESIS OF LUMBAR REGION: ICD-10-CM

## 2023-02-13 DIAGNOSIS — E66.01 SEVERE OBESITY (BMI 35.0-39.9) WITH COMORBIDITY (HCC): ICD-10-CM

## 2023-02-13 DIAGNOSIS — R35.1 BENIGN PROSTATIC HYPERPLASIA WITH NOCTURIA: ICD-10-CM

## 2023-02-13 DIAGNOSIS — Z91.81 RISK FOR FALLS: ICD-10-CM

## 2023-02-13 DIAGNOSIS — K21.9 GASTROESOPHAGEAL REFLUX DISEASE WITHOUT ESOPHAGITIS: ICD-10-CM

## 2023-02-13 DIAGNOSIS — M54.9 BILATERAL BACK PAIN, UNSPECIFIED BACK LOCATION, UNSPECIFIED CHRONICITY: ICD-10-CM

## 2023-02-13 DIAGNOSIS — Z79.4 TYPE 2 DIABETES MELLITUS WITHOUT COMPLICATION, WITH LONG-TERM CURRENT USE OF INSULIN (HCC): ICD-10-CM

## 2023-02-13 RX ORDER — ATORVASTATIN CALCIUM 40 MG/1
40 TABLET, FILM COATED ORAL NIGHTLY
Qty: 90 TABLET | Refills: 1 | Status: SHIPPED | OUTPATIENT
Start: 2023-02-13

## 2023-02-13 RX ORDER — METOPROLOL SUCCINATE 25 MG/1
25 TABLET, EXTENDED RELEASE ORAL DAILY
Qty: 90 TABLET | Refills: 1 | Status: SHIPPED | OUTPATIENT
Start: 2023-02-13

## 2023-02-13 RX ORDER — TIZANIDINE 4 MG/1
4 TABLET ORAL EVERY 6 HOURS PRN
Qty: 60 TABLET | Refills: 0 | Status: SHIPPED | OUTPATIENT
Start: 2023-02-13

## 2023-02-13 RX ORDER — AMITRIPTYLINE HYDROCHLORIDE 75 MG/1
75 TABLET, FILM COATED ORAL NIGHTLY
Qty: 90 TABLET | Refills: 0 | Status: SHIPPED | OUTPATIENT
Start: 2023-02-13

## 2023-02-13 RX ORDER — DAPAGLIFLOZIN AND METFORMIN HYDROCHLORIDE 5; 1000 MG/1; MG/1
1 TABLET, FILM COATED, EXTENDED RELEASE ORAL 2 TIMES DAILY
Qty: 180 TABLET | Refills: 1 | Status: SHIPPED | OUTPATIENT
Start: 2023-02-13

## 2023-02-13 RX ORDER — DULAGLUTIDE 1.5 MG/.5ML
1.5 INJECTION, SOLUTION SUBCUTANEOUS WEEKLY
Qty: 6 ML | Refills: 1 | Status: SHIPPED | OUTPATIENT
Start: 2023-02-13

## 2023-02-13 RX ORDER — INSULIN LISPRO 100 [IU]/ML
INJECTION, SUSPENSION SUBCUTANEOUS
Qty: 18 ML | Refills: 1 | Status: SHIPPED | OUTPATIENT
Start: 2023-02-13

## 2023-02-13 RX ORDER — LOSARTAN POTASSIUM 25 MG/1
25 TABLET ORAL DAILY
Qty: 90 TABLET | Refills: 1 | Status: SHIPPED | OUTPATIENT
Start: 2023-02-13

## 2023-02-13 RX ORDER — CELECOXIB 200 MG/1
200 CAPSULE ORAL DAILY
Qty: 90 CAPSULE | Refills: 1 | Status: SHIPPED | OUTPATIENT
Start: 2023-02-13

## 2023-02-13 RX ORDER — TAMSULOSIN HYDROCHLORIDE 0.4 MG/1
CAPSULE ORAL
Qty: 90 CAPSULE | Refills: 2 | Status: SHIPPED | OUTPATIENT
Start: 2023-02-13

## 2023-02-15 ENCOUNTER — EKG ENCOUNTER (OUTPATIENT)
Dept: LAB | Age: 68
End: 2023-02-15
Attending: FAMILY MEDICINE
Payer: MEDICARE

## 2023-02-15 ENCOUNTER — TELEPHONE (OUTPATIENT)
Dept: FAMILY MEDICINE CLINIC | Facility: CLINIC | Age: 68
End: 2023-02-15

## 2023-02-15 ENCOUNTER — LAB ENCOUNTER (OUTPATIENT)
Dept: LAB | Age: 68
End: 2023-02-15
Attending: FAMILY MEDICINE
Payer: MEDICARE

## 2023-02-15 DIAGNOSIS — E11.9 TYPE 2 DIABETES MELLITUS WITHOUT COMPLICATION, WITH LONG-TERM CURRENT USE OF INSULIN (HCC): ICD-10-CM

## 2023-02-15 DIAGNOSIS — R35.1 BENIGN PROSTATIC HYPERPLASIA WITH NOCTURIA: ICD-10-CM

## 2023-02-15 DIAGNOSIS — N40.1 BENIGN PROSTATIC HYPERPLASIA WITH NOCTURIA: ICD-10-CM

## 2023-02-15 DIAGNOSIS — I10 ESSENTIAL HYPERTENSION: ICD-10-CM

## 2023-02-15 DIAGNOSIS — E78.00 PURE HYPERCHOLESTEROLEMIA: ICD-10-CM

## 2023-02-15 DIAGNOSIS — E55.9 HYPOVITAMINOSIS D: ICD-10-CM

## 2023-02-15 DIAGNOSIS — Z79.4 TYPE 2 DIABETES MELLITUS WITHOUT COMPLICATION, WITH LONG-TERM CURRENT USE OF INSULIN (HCC): ICD-10-CM

## 2023-02-15 LAB
ALBUMIN SERPL-MCNC: 3.6 G/DL (ref 3.4–5)
ALBUMIN/GLOB SERPL: 0.9 {RATIO} (ref 1–2)
ALP LIVER SERPL-CCNC: 76 U/L
ALT SERPL-CCNC: 43 U/L
ANION GAP SERPL CALC-SCNC: 7 MMOL/L (ref 0–18)
AST SERPL-CCNC: 17 U/L (ref 15–37)
ATRIAL RATE: 65 BPM
BASOPHILS # BLD AUTO: 0.05 X10(3) UL (ref 0–0.2)
BASOPHILS NFR BLD AUTO: 0.6 %
BILIRUB SERPL-MCNC: 0.5 MG/DL (ref 0.1–2)
BILIRUB UR QL: NEGATIVE
BUN BLD-MCNC: 11 MG/DL (ref 7–18)
BUN/CREAT SERPL: 12.8 (ref 10–20)
CALCIUM BLD-MCNC: 9.2 MG/DL (ref 8.5–10.1)
CHLORIDE SERPL-SCNC: 109 MMOL/L (ref 98–112)
CHOLEST SERPL-MCNC: 138 MG/DL (ref ?–200)
CLARITY UR: CLEAR
CO2 SERPL-SCNC: 27 MMOL/L (ref 21–32)
COLOR UR: YELLOW
CREAT BLD-MCNC: 0.86 MG/DL
DEPRECATED RDW RBC AUTO: 47.8 FL (ref 35.1–46.3)
EOSINOPHIL # BLD AUTO: 0.1 X10(3) UL (ref 0–0.7)
EOSINOPHIL NFR BLD AUTO: 1.3 %
ERYTHROCYTE [DISTWIDTH] IN BLOOD BY AUTOMATED COUNT: 14 % (ref 11–15)
EST. AVERAGE GLUCOSE BLD GHB EST-MCNC: 197 MG/DL (ref 68–126)
FASTING PATIENT LIPID ANSWER: YES
FASTING STATUS PATIENT QL REPORTED: YES
GFR SERPLBLD BASED ON 1.73 SQ M-ARVRAT: 95 ML/MIN/1.73M2 (ref 60–?)
GLOBULIN PLAS-MCNC: 3.9 G/DL (ref 2.8–4.4)
GLUCOSE BLD-MCNC: 87 MG/DL (ref 70–99)
GLUCOSE UR-MCNC: >=500 MG/DL
HBA1C MFR BLD: 8.5 % (ref ?–5.7)
HCT VFR BLD AUTO: 43.8 %
HDLC SERPL-MCNC: 42 MG/DL (ref 40–59)
HGB BLD-MCNC: 14.4 G/DL
HGB UR QL STRIP.AUTO: NEGATIVE
IMM GRANULOCYTES # BLD AUTO: 0.08 X10(3) UL (ref 0–1)
IMM GRANULOCYTES NFR BLD: 1 %
KETONES UR-MCNC: NEGATIVE MG/DL
LDLC SERPL CALC-MCNC: 70 MG/DL (ref ?–100)
LEUKOCYTE ESTERASE UR QL STRIP.AUTO: NEGATIVE
LYMPHOCYTES # BLD AUTO: 3.1 X10(3) UL (ref 1–4)
LYMPHOCYTES NFR BLD AUTO: 39 %
MCH RBC QN AUTO: 30.4 PG (ref 26–34)
MCHC RBC AUTO-ENTMCNC: 32.9 G/DL (ref 31–37)
MCV RBC AUTO: 92.4 FL
MONOCYTES # BLD AUTO: 0.41 X10(3) UL (ref 0.1–1)
MONOCYTES NFR BLD AUTO: 5.2 %
NEUTROPHILS # BLD AUTO: 4.21 X10 (3) UL (ref 1.5–7.7)
NEUTROPHILS # BLD AUTO: 4.21 X10(3) UL (ref 1.5–7.7)
NEUTROPHILS NFR BLD AUTO: 52.9 %
NITRITE UR QL STRIP.AUTO: NEGATIVE
NONHDLC SERPL-MCNC: 96 MG/DL (ref ?–130)
OSMOLALITY SERPL CALC.SUM OF ELEC: 295 MOSM/KG (ref 275–295)
P AXIS: 9 DEGREES
P-R INTERVAL: 178 MS
PH UR: 6 [PH] (ref 5–8)
PLATELET # BLD AUTO: 248 10(3)UL (ref 150–450)
POTASSIUM SERPL-SCNC: 3.9 MMOL/L (ref 3.5–5.1)
PROT SERPL-MCNC: 7.5 G/DL (ref 6.4–8.2)
PROT UR-MCNC: NEGATIVE MG/DL
PSA SERPL-MCNC: 0.55 NG/ML (ref ?–4)
Q-T INTERVAL: 410 MS
QRS DURATION: 104 MS
QTC CALCULATION (BEZET): 426 MS
R AXIS: -31 DEGREES
RBC # BLD AUTO: 4.74 X10(6)UL
SODIUM SERPL-SCNC: 143 MMOL/L (ref 136–145)
SP GR UR STRIP: 1.01 (ref 1–1.03)
T AXIS: 53 DEGREES
TRIGL SERPL-MCNC: 149 MG/DL (ref 30–149)
TSI SER-ACNC: 1.92 MIU/ML (ref 0.36–3.74)
UROBILINOGEN UR STRIP-ACNC: <2
VENTRICULAR RATE: 65 BPM
VIT C UR-MCNC: NEGATIVE MG/DL
VIT D+METAB SERPL-MCNC: 37.9 NG/ML (ref 30–100)
VLDLC SERPL CALC-MCNC: 23 MG/DL (ref 0–30)
WBC # BLD AUTO: 8 X10(3) UL (ref 4–11)

## 2023-02-15 PROCEDURE — 3052F HG A1C>EQUAL 8.0%<EQUAL 9.0%: CPT | Performed by: FAMILY MEDICINE

## 2023-02-15 PROCEDURE — 36415 COLL VENOUS BLD VENIPUNCTURE: CPT

## 2023-02-15 PROCEDURE — 82306 VITAMIN D 25 HYDROXY: CPT

## 2023-02-15 PROCEDURE — 81003 URINALYSIS AUTO W/O SCOPE: CPT

## 2023-02-15 PROCEDURE — 80061 LIPID PANEL: CPT

## 2023-02-15 PROCEDURE — 93010 ELECTROCARDIOGRAM REPORT: CPT | Performed by: INTERNAL MEDICINE

## 2023-02-15 PROCEDURE — 80053 COMPREHEN METABOLIC PANEL: CPT

## 2023-02-15 PROCEDURE — 84153 ASSAY OF PSA TOTAL: CPT

## 2023-02-15 PROCEDURE — 93005 ELECTROCARDIOGRAM TRACING: CPT

## 2023-02-15 PROCEDURE — 85025 COMPLETE CBC W/AUTO DIFF WBC: CPT

## 2023-02-15 PROCEDURE — 83036 HEMOGLOBIN GLYCOSYLATED A1C: CPT

## 2023-02-15 PROCEDURE — 84443 ASSAY THYROID STIM HORMONE: CPT

## 2023-02-20 ENCOUNTER — OFFICE VISIT (OUTPATIENT)
Dept: FAMILY MEDICINE CLINIC | Facility: CLINIC | Age: 68
End: 2023-02-20

## 2023-02-20 VITALS
HEART RATE: 69 BPM | SYSTOLIC BLOOD PRESSURE: 122 MMHG | HEIGHT: 66 IN | WEIGHT: 252.63 LBS | BODY MASS INDEX: 40.6 KG/M2 | DIASTOLIC BLOOD PRESSURE: 75 MMHG

## 2023-02-20 DIAGNOSIS — E11.65 UNCONTROLLED TYPE 2 DIABETES MELLITUS WITH HYPERGLYCEMIA (HCC): Primary | ICD-10-CM

## 2023-02-20 PROCEDURE — 3078F DIAST BP <80 MM HG: CPT | Performed by: FAMILY MEDICINE

## 2023-02-20 PROCEDURE — 3008F BODY MASS INDEX DOCD: CPT | Performed by: FAMILY MEDICINE

## 2023-02-20 PROCEDURE — 3074F SYST BP LT 130 MM HG: CPT | Performed by: FAMILY MEDICINE

## 2023-02-20 PROCEDURE — 1126F AMNT PAIN NOTED NONE PRSNT: CPT | Performed by: FAMILY MEDICINE

## 2023-02-20 PROCEDURE — 99213 OFFICE O/P EST LOW 20 MIN: CPT | Performed by: FAMILY MEDICINE

## 2023-02-20 RX ORDER — INSULIN LISPRO 100 [IU]/ML
INJECTION, SUSPENSION SUBCUTANEOUS
Qty: 18 ML | Refills: 1 | Status: SHIPPED | OUTPATIENT
Start: 2023-02-20

## 2023-02-20 RX ORDER — ATORVASTATIN CALCIUM 40 MG/1
40 TABLET, FILM COATED ORAL NIGHTLY
Qty: 90 TABLET | Refills: 1 | Status: SHIPPED | OUTPATIENT
Start: 2023-02-20

## 2023-02-20 RX ORDER — INSULIN LISPRO 100 [IU]/ML
INJECTION, SUSPENSION SUBCUTANEOUS
Qty: 18 ML | Refills: 1 | Status: SHIPPED | OUTPATIENT
Start: 2023-02-20 | End: 2023-02-20

## 2023-02-20 RX ORDER — AMITRIPTYLINE HYDROCHLORIDE 50 MG/1
50 TABLET, FILM COATED ORAL NIGHTLY
Qty: 90 TABLET | Refills: 1 | Status: SHIPPED | OUTPATIENT
Start: 2023-02-20

## 2023-02-20 RX ORDER — TAMSULOSIN HYDROCHLORIDE 0.4 MG/1
CAPSULE ORAL
Qty: 90 CAPSULE | Refills: 2 | Status: SHIPPED | OUTPATIENT
Start: 2023-02-20

## 2023-04-04 ENCOUNTER — OFFICE VISIT (OUTPATIENT)
Dept: FAMILY MEDICINE CLINIC | Facility: CLINIC | Age: 68
End: 2023-04-04

## 2023-04-04 VITALS
HEIGHT: 66 IN | WEIGHT: 258 LBS | SYSTOLIC BLOOD PRESSURE: 134 MMHG | DIASTOLIC BLOOD PRESSURE: 81 MMHG | BODY MASS INDEX: 41.46 KG/M2 | HEART RATE: 73 BPM

## 2023-04-04 DIAGNOSIS — G47.31 PRIMARY CENTRAL SLEEP APNEA: Primary | ICD-10-CM

## 2023-04-04 PROCEDURE — 3075F SYST BP GE 130 - 139MM HG: CPT | Performed by: FAMILY MEDICINE

## 2023-04-04 PROCEDURE — 3008F BODY MASS INDEX DOCD: CPT | Performed by: FAMILY MEDICINE

## 2023-04-04 PROCEDURE — 3079F DIAST BP 80-89 MM HG: CPT | Performed by: FAMILY MEDICINE

## 2023-04-04 PROCEDURE — 99213 OFFICE O/P EST LOW 20 MIN: CPT | Performed by: FAMILY MEDICINE

## 2023-04-04 PROCEDURE — 1125F AMNT PAIN NOTED PAIN PRSNT: CPT | Performed by: FAMILY MEDICINE

## 2023-05-03 DIAGNOSIS — M54.9 BILATERAL BACK PAIN, UNSPECIFIED BACK LOCATION, UNSPECIFIED CHRONICITY: ICD-10-CM

## 2023-05-03 RX ORDER — TIZANIDINE 4 MG/1
4 TABLET ORAL EVERY 6 HOURS PRN
Qty: 60 TABLET | Refills: 0 | Status: SHIPPED | OUTPATIENT
Start: 2023-05-03

## 2023-05-03 NOTE — TELEPHONE ENCOUNTER
Please review refill protocol failed/ no protocol  Requested Prescriptions   Pending Prescriptions Disp Refills    TIZANIDINE 4 MG Oral Tab [Pharmacy Med Name: TIZANIDINE 4MG TABLETS] 60 tablet 0     Sig: TAKE 1 TABLET(4 MG) BY MOUTH EVERY 6 HOURS AS NEEDED       There is no refill protocol information for this order

## 2023-05-10 RX ORDER — DULAGLUTIDE 1.5 MG/.5ML
1.5 INJECTION, SOLUTION SUBCUTANEOUS WEEKLY
Qty: 18 ML | Refills: 1 | Status: SHIPPED | OUTPATIENT
Start: 2023-05-10

## 2023-05-10 NOTE — TELEPHONE ENCOUNTER
Please review. Protocol failed / No Protocol.     Requested Prescriptions   Pending Prescriptions Disp Refills    TRULICITY 1.5 WW/3.2NC Subcutaneous Solution Pen-injector [Pharmacy Med Name: Mariela Yvon 9.5XL/6.8AH SDP 0.5ML] 6 mL 1     Sig: ADMINISTER 1.5 MG UNDER THE SKIN 1 TIME A WEEK       Diabetes Medication Protocol Failed - 5/8/2023  9:52 AM        Failed - Last A1C < 7.5 and within past 6 months     Lab Results   Component Value Date    A1C 8.5 (H) 02/15/2023               Passed - In person appointment or virtual visit in the past 6 mos or appointment in next 3 mos     Recent Outpatient Visits              1 month ago Primary central sleep apnea    [address], [place] Boc[place]ra, Angela Jacobo MD    Office Visit    2 months ago Uncontrolled type 2 diabetes mellitus with hyperglycemia [facility])    [address], [place] Bocanegra, Angela Jacobo MD    Office Visit    2 months ago Medicare annual wellness visit, subsequent    [address] Gadiel, Angela Jacobo MD    Office Visit    5 months ago Lumbar stenosis with neurogenic claudication    [facility] Tere Cortes DO    Office Visit    6 months ago Lumbar stenosis with neurogenic claudication    [facility], [address] [facility],[address], [place], [place]    Office Visit          Future Appointments         Provider Department Appt Notes    In 1 week Diogenes Taylor MD [address] [place] Ramesh 3 month f/u    In 3 weeks [address] [facility] [id number] no pa req    In 2 months Diogenes Taylor MD [address] [place] Ramesh follow up               Passed [facility] or GFRNAA > 50     GFR Evaluation  EGFRCR: 95 , resulted on 2/15/2023            Passed - GFR in the past 12 months

## 2023-05-22 ENCOUNTER — OFFICE VISIT (OUTPATIENT)
Dept: FAMILY MEDICINE CLINIC | Facility: CLINIC | Age: 68
End: 2023-05-22

## 2023-05-22 ENCOUNTER — LAB ENCOUNTER (OUTPATIENT)
Dept: LAB | Age: 68
End: 2023-05-22
Attending: FAMILY MEDICINE
Payer: MEDICARE

## 2023-05-22 VITALS
SYSTOLIC BLOOD PRESSURE: 130 MMHG | DIASTOLIC BLOOD PRESSURE: 74 MMHG | WEIGHT: 255.81 LBS | BODY MASS INDEX: 41.11 KG/M2 | HEIGHT: 66 IN | HEART RATE: 75 BPM

## 2023-05-22 DIAGNOSIS — Z79.4 TYPE 2 DIABETES MELLITUS WITHOUT COMPLICATION, WITH LONG-TERM CURRENT USE OF INSULIN (HCC): Primary | ICD-10-CM

## 2023-05-22 DIAGNOSIS — Z79.4 TYPE 2 DIABETES MELLITUS WITHOUT COMPLICATION, WITH LONG-TERM CURRENT USE OF INSULIN (HCC): ICD-10-CM

## 2023-05-22 DIAGNOSIS — E11.9 TYPE 2 DIABETES MELLITUS WITHOUT COMPLICATION, WITH LONG-TERM CURRENT USE OF INSULIN (HCC): Primary | ICD-10-CM

## 2023-05-22 DIAGNOSIS — E11.42 TYPE 2 DIABETES MELLITUS WITH DIABETIC POLYNEUROPATHY, WITH LONG-TERM CURRENT USE OF INSULIN (HCC): ICD-10-CM

## 2023-05-22 DIAGNOSIS — Z79.4 TYPE 2 DIABETES MELLITUS WITH DIABETIC POLYNEUROPATHY, WITH LONG-TERM CURRENT USE OF INSULIN (HCC): ICD-10-CM

## 2023-05-22 DIAGNOSIS — E78.00 PURE HYPERCHOLESTEROLEMIA: ICD-10-CM

## 2023-05-22 DIAGNOSIS — I10 ESSENTIAL HYPERTENSION: ICD-10-CM

## 2023-05-22 DIAGNOSIS — E11.9 TYPE 2 DIABETES MELLITUS WITHOUT COMPLICATION, WITH LONG-TERM CURRENT USE OF INSULIN (HCC): ICD-10-CM

## 2023-05-22 LAB
CARTRIDGE LOT#: ABNORMAL NUMERIC
CREAT UR-SCNC: 91.1 MG/DL
HEMOGLOBIN A1C: 7.6 % (ref 4.3–5.6)
MICROALBUMIN UR-MCNC: <0.5 MG/DL

## 2023-05-22 PROCEDURE — 83036 HEMOGLOBIN GLYCOSYLATED A1C: CPT | Performed by: FAMILY MEDICINE

## 2023-05-22 PROCEDURE — 1125F AMNT PAIN NOTED PAIN PRSNT: CPT | Performed by: FAMILY MEDICINE

## 2023-05-22 PROCEDURE — 3051F HG A1C>EQUAL 7.0%<8.0%: CPT | Performed by: FAMILY MEDICINE

## 2023-05-22 PROCEDURE — 82570 ASSAY OF URINE CREATININE: CPT

## 2023-05-22 PROCEDURE — 3075F SYST BP GE 130 - 139MM HG: CPT | Performed by: FAMILY MEDICINE

## 2023-05-22 PROCEDURE — 3078F DIAST BP <80 MM HG: CPT | Performed by: FAMILY MEDICINE

## 2023-05-22 PROCEDURE — 3061F NEG MICROALBUMINURIA REV: CPT | Performed by: FAMILY MEDICINE

## 2023-05-22 PROCEDURE — 3008F BODY MASS INDEX DOCD: CPT | Performed by: FAMILY MEDICINE

## 2023-05-22 PROCEDURE — 1159F MED LIST DOCD IN RCRD: CPT | Performed by: FAMILY MEDICINE

## 2023-05-22 PROCEDURE — 82043 UR ALBUMIN QUANTITATIVE: CPT

## 2023-05-22 PROCEDURE — 99214 OFFICE O/P EST MOD 30 MIN: CPT | Performed by: FAMILY MEDICINE

## 2023-05-22 RX ORDER — L-METHYLFOLATE-ALGAE-VIT B12-B6 CAP 3-90.314-2-35 MG 3-90.314-2-35 MG
2 CAP ORAL DAILY
Qty: 180 CAPSULE | Refills: 1 | Status: SHIPPED | OUTPATIENT
Start: 2023-05-22 | End: 2023-06-21

## 2023-05-22 RX ORDER — AMITRIPTYLINE HYDROCHLORIDE 50 MG/1
TABLET, FILM COATED ORAL
Qty: 90 TABLET | Refills: 1 | Status: SHIPPED | OUTPATIENT
Start: 2023-05-22

## 2023-05-22 NOTE — TELEPHONE ENCOUNTER
Please review. Protocol failed / Has no protocol.      Requested Prescriptions   Pending Prescriptions Disp Refills    AMITRIPTYLINE 50 MG Oral Tab [Pharmacy Med Name: AMITRIPTYLINE 50MG TABLETS] 90 tablet 1     Sig: TAKE 1 TABLET(50 MG) BY MOUTH EVERY NIGHT       There is no refill protocol information for this order        Future Appointments         Provider Department Appt Notes    In 3 days DO Alie Pedro, [address], [place] f/up    In 2 weeks [address] [facility] [id number] no pa req    In 1 month Elizabeth Scott MD [address] [facility], [place] follow up           Recent Outpatient Visits              1 month ago Primary central sleep apnea    David Kingston, Sruthi Baron MD    Office Visit    3 months ago Uncontrolled type 2 diabetes mellitus with hyperglycemia [facility])    David Kingston, Sruthi Baron MD    Office Visit    3 months ago Medicare annual wellness visit, subsequent    David Kingston, Sruthi Baron MD    Office Visit    5 months ago Lumbar stenosis with neurogenic claudication    [facility] Daniella Proctor DO    Office Visit    6 months ago Lumbar stenosis with neurogenic claudication    [facility], [address], Hardik Roberts [place], [place]    Office Visit

## 2023-05-23 RX ORDER — INSULIN LISPRO 100 [IU]/ML
INJECTION, SUSPENSION SUBCUTANEOUS
Qty: 18 ML | Refills: 1 | Status: SHIPPED | OUTPATIENT
Start: 2023-05-23

## 2023-05-23 NOTE — TELEPHONE ENCOUNTER
Please review. Protocol failed / Has no protocol.      Requested Prescriptions   Pending Prescriptions Disp Refills    HUMALOG MIX 75/25 KWIKPEN (75-25) 100 UNIT/ML Subcutaneous Suspension Pen-injector [Pharmacy Med Name: [address] [place] [address] 3ML] 18 mL 1     Sig: INJECT 44 UNITS UNDER THE SKIN EVERY MORNING AND 42 UNITS UNDER THE SKIN EVERY EVENING       Diabetes Medication Protocol Failed - 5/22/2023 12:32 PM        Failed - Last A1C < 7.5 and within past 6 months     Lab Results   Component Value Date    A1C 7.6 (A) 05/22/2023               Passed - In person appointment or virtual visit in the past 6 mos or appointment in next 3 mos     Recent Outpatient Visits              Yesterday Type 2 diabetes mellitus without complication, with long-term current use of insulin ([facility]    [facility], [place] Boc[place]egra, Courtney Boast, MD    Office Visit    1 month ago Primary central sleep apnea    Freada Solomon, Courtney Boast, MD    Office Visit    3 months ago Uncontrolled type 2 diabetes mellitus with hyperglycemia [facility])    David Irizarry, Courtney Boast, MD    Office Visit    3 months ago Medicare annual wellness visit, subsequent    Freada Solomon, Courtney Boast, MD    Office Visit    5 months ago Lumbar stenosis with neurogenic claudication    [facility] Hardik Albarado DO    Office Visit          Future Appointments         Provider Department Appt Notes    In 2 days Pb Garza, [address] [facility],[address], [place] f/up    In 2 weeks [email or web address] no pa req    In 1 month MD Maria Ines Au Add[place] follow up               [facility] or GFRNAA > 50     GFR Evaluation  EGFRCR: 95 , resulted on 2/15/2023            Passed - GFR in the past 12 months Future Appointments         Provider Department Appt Notes    In 2 days [facility], [address], [place] f/up    In 2 weeks [address] [facility] [id number] no pa req    In 1 month MD Maris Wan [place] follow up           Recent Outpatient Visits              Yesterday Type 2 diabetes mellitus without complication, with long-term current use of insulin [facility])    [address], [place] Bocanegra, Fab Braswell MD    Office Visit    1 month ago Primary central sleep apnea    Fab Seaman MD    Office Visit    3 months ago Uncontrolled type 2 diabetes mellitus with hyperglycemia [facility])    [address], [address] 86, Fab Braswell MD    Office Visit    3 months ago Medicare annual wellness visit, subsequent    Fab Seaman MD    Office Visit    5 months ago Lumbar stenosis with neurogenic claudication    [facility] Racheal Jimenez DO    Office Visit

## 2023-05-25 ENCOUNTER — OFFICE VISIT (OUTPATIENT)
Dept: PHYSICAL MEDICINE AND REHAB | Facility: CLINIC | Age: 68
End: 2023-05-25
Payer: COMMERCIAL

## 2023-05-25 ENCOUNTER — TELEPHONE (OUTPATIENT)
Dept: FAMILY MEDICINE CLINIC | Facility: CLINIC | Age: 68
End: 2023-05-25

## 2023-05-25 VITALS
DIASTOLIC BLOOD PRESSURE: 72 MMHG | HEART RATE: 80 BPM | BODY MASS INDEX: 41 KG/M2 | SYSTOLIC BLOOD PRESSURE: 140 MMHG | OXYGEN SATURATION: 98 % | WEIGHT: 255 LBS

## 2023-05-25 DIAGNOSIS — G62.9 POLYNEUROPATHY: ICD-10-CM

## 2023-05-25 DIAGNOSIS — M48.062 LUMBAR STENOSIS WITH NEUROGENIC CLAUDICATION: Primary | ICD-10-CM

## 2023-05-25 PROCEDURE — 3078F DIAST BP <80 MM HG: CPT | Performed by: PHYSICAL MEDICINE & REHABILITATION

## 2023-05-25 PROCEDURE — 1160F RVW MEDS BY RX/DR IN RCRD: CPT | Performed by: PHYSICAL MEDICINE & REHABILITATION

## 2023-05-25 PROCEDURE — 1159F MED LIST DOCD IN RCRD: CPT | Performed by: PHYSICAL MEDICINE & REHABILITATION

## 2023-05-25 PROCEDURE — 99213 OFFICE O/P EST LOW 20 MIN: CPT | Performed by: PHYSICAL MEDICINE & REHABILITATION

## 2023-05-25 PROCEDURE — 3077F SYST BP >= 140 MM HG: CPT | Performed by: PHYSICAL MEDICINE & REHABILITATION

## 2023-05-25 PROCEDURE — 1125F AMNT PAIN NOTED PAIN PRSNT: CPT | Performed by: PHYSICAL MEDICINE & REHABILITATION

## 2023-05-25 RX ORDER — PREGABALIN 75 MG/1
CAPSULE ORAL
Qty: 60 CAPSULE | Refills: 0 | Status: SHIPPED | OUTPATIENT
Start: 2023-05-25

## 2023-05-25 NOTE — TELEPHONE ENCOUNTER
Spoke with Zafar Sosa at [address] [facility] who stated this medication is not covered by insurance and there is no alternative that would be covered. The oop cost for a 3 month supply is $709. Please advise.

## 2023-05-25 NOTE — TELEPHONE ENCOUNTER
Yes, please call patient and inform him of this= self pay or just to continue with Gabapentin which he is already taking.

## 2023-05-26 NOTE — TELEPHONE ENCOUNTER
Patient contacted and informed of Dr. Marry Man' note as stated below. Patient states for pain management he is currently on celbrex 200mg, tizanidine 4mg, and now was prescribed pregabalin 75mg by Dr. Soraya London yesterday. Patient states he is not on gabapentin. Has been off gabapentin since 2020. Patient asking if he is to be on gabapentin as well, to please send script. Please clarify, thank you.

## 2023-05-26 NOTE — TELEPHONE ENCOUNTER
Spoke with pt,  verified, pt was offered a  but he declined. Pt stated he is returning our call. Pt  was informed of MD recommendation, pt stated understanding.          MICAELA

## 2023-06-20 DIAGNOSIS — M54.9 BILATERAL BACK PAIN, UNSPECIFIED BACK LOCATION, UNSPECIFIED CHRONICITY: ICD-10-CM

## 2023-06-20 RX ORDER — TIZANIDINE 4 MG/1
4 TABLET ORAL EVERY 6 HOURS PRN
Qty: 60 TABLET | Refills: 0 | Status: SHIPPED | OUTPATIENT
Start: 2023-06-20

## 2023-06-20 NOTE — TELEPHONE ENCOUNTER
Please review;  No Protocol  Rx Pended, authorize if appropriate    Requested Prescriptions   Pending Prescriptions Disp Refills    TIZANIDINE 4 MG Oral Tab [Pharmacy Med Name: TIZANIDINE 4MG TABLETS] 60 tablet 0     Sig: TAKE 1 TABLET(4 MG) BY MOUTH EVERY 6 HOURS AS NEEDED       There is no refill protocol information for this order          Recent Outpatient Visits              3 weeks ago Lumbar stenosis with neurogenic claudication    [facility], [address], [place], [place]    Office Visit    4 weeks ago Type 2 diabetes mellitus without complication, with long-term current use of insulin [facility])    [address], [place] Boc[place]egra, Prosper Montoya MD    Office Visit    2 months ago Primary central sleep apnea    [address] [facility], Prosper Montoya MD    Office Visit    4 months ago Uncontrolled type 2 diabetes mellitus with hyperglycemia [facility])    [address], [place] Bocanegra, Prosper Montoya MD    Office Visit    4 months ago Medicare annual wellness visit, subsequent    [address], [place] Boc[place], Prosper Montoya MD    Office Visit          Future Appointments         Provider Department Appt Notes    In 4 weeks Shahrzad Shearer MD [address] [facility], [place] follow up    In 1 month [address] [facility] [address] rhea pereira
Denies

## 2023-06-26 ENCOUNTER — TELEPHONE (OUTPATIENT)
Dept: FAMILY MEDICINE CLINIC | Facility: CLINIC | Age: 68
End: 2023-06-26

## 2023-06-26 RX ORDER — ATORVASTATIN CALCIUM 40 MG/1
40 TABLET, FILM COATED ORAL NIGHTLY
Qty: 90 TABLET | Refills: 3 | Status: SHIPPED | OUTPATIENT
Start: 2023-06-26

## 2023-06-26 RX ORDER — LOSARTAN POTASSIUM 25 MG/1
25 TABLET ORAL DAILY
Qty: 90 TABLET | Refills: 3 | Status: SHIPPED | OUTPATIENT
Start: 2023-06-26

## 2023-06-26 NOTE — TELEPHONE ENCOUNTER
Pharmacy requesting refill      tamsulosin 0.4 MG Oral Cap TAKE 1 CAPSULE BY MOUTH EVERY DAY 90 capsule 2

## 2023-06-26 NOTE — TELEPHONE ENCOUNTER
Please review. Protocol failed / Has no protocol. Requested Prescriptions   Pending Prescriptions Disp Refills    losartan 25 MG Oral Tab [Pharmacy Med Name: LOSARTAN 25MG TABLETS] 90 tablet 1     Sig: Take 1 tablet (25 mg total) by mouth daily.        Hypertensive Medications Protocol Failed - 6/25/2023  9:08 AM        Failed - Last BP reading less than 140/90     BP Readings from Last 1 Encounters:  05/25/23 : 140/72              Passed - In person appointment in the past 12 or next 3 months     Recent Outpatient Visits              1 month ago Lumbar stenosis with neurogenic claudication    [facility], [address], [place], [place]    Office Visit    1 month ago Type 2 diabetes mellitus without complication, with long-term current use of insulin [facility])    David Irizarry, Courtney Boast, MD    Office Visit    2 months ago Primary central sleep apnea    [facility] [address], Courtney Boast, MD    Office Visit    4 months ago Uncontrolled type 2 diabetes mellitus with hyperglycemia [facility])    David Irizarry, Courtney Boast, MD    Office Visit    4 months ago Medicare annual wellness visit, subsequent    [facility] [address], Courtney Boast, MD    Office Visit          Future Appointments         Provider Department Appt Notes    In 3 weeks Stew Keane MD [facility] [address] [facility], [place] follow up    In 1 month [address] [facility] [address] no pa req               Passed - CMP or BMP in past 6 months     Recent Results (from the past 4392 hour(s))   COMP METABOLIC PANEL (14)    Collection Time: 02/15/23  8:52 AM   Result Value Ref Range    Glucose 87 70 - 99 mg/dL    Sodium 143 136 - 145 mmol/L    Potassium 3.9 3.5 - 5.1 mmol/L    Chloride 109 98 - 112 mmol/L    CO2 27.0 21.0 - 32.0 mmol/L Anion Gap 7 0 - 18 mmol/L    BUN 11 7 - 18 mg/dL    Creatinine 0.86 0.70 - 1.30 mg/dL    BUN/CREA Ratio 12.8 10.0 - 20.0    Calcium, Total 9.2 8.5 - 10.1 mg/dL    Calculated Osmolality 295 275 - 295 mOsm/kg    eGFR-Cr 95 >=60 mL/min/1.73m2    ALT 43 16 - 61 U/L    AST 17 15 - 37 U/L    Alkaline Phosphatase 76 45 - 117 U/L    Bilirubin, Total 0.5 0.1 - 2.0 mg/dL    Total Protein 7.5 6.4 - 8.2 g/dL    Albumin 3.6 3.4 - 5.0 g/dL    Globulin  3.9 2.8 - 4.4 g/dL    A/G Ratio 0.9 (L) 1.0 - 2.0    Patient Fasting for CMP? Yes      *Note: Due to a large number of results and/or encounters for the requested time period, some results have not been displayed. A complete set of results can be found in Results Review.                Passed - In person appointment or virtual visit in the past 6 months     Recent Outpatient Visits              1 month ago Lumbar stenosis with neurogenic claudication    [facility], [address] [facility],[address], [place], [place]    Office Visit    1 month ago Type 2 diabetes mellitus without complication, with long-term current use of insulin [facility])    [address], [place] Boc[place]egra, Ginny Franks MD    Office Visit    2 months ago Primary central sleep apnea    Ginny Padilla MD    Office Visit    4 months ago Uncontrolled type 2 diabetes mellitus with hyperglycemia [facility])    [address], [place] Bocanegra, Ginny Franks MD    Office Visit    4 months ago Medicare annual wellness visit, subsequent    [address], [place] Bocanegra, Ginny Franks MD    Office Visit          Future Appointments         Provider Department Appt Notes    In 3 weeks MD Sandi Connelly Addison follow up    In 1 month [address] [facility] [id number] no pa req               Passed - EGFRCR or GFRNAA > 50     GFR Evaluation  EGFRCR: 95 , resulted on 2/15/2023           Signed Prescriptions Disp Refills    atorvastatin 40 MG Oral Tab 90 tablet 3     Sig: Take 1 tablet (40 mg total) by mouth nightly.        Cholesterol Medication Protocol Passed - 6/25/2023  9:08 AM        Passed - ALT in past 12 months        Passed - LDL in past 12 months        Passed - Last ALT < 80     Lab Results   Component Value Date    ALT 43 02/15/2023             Passed - Last LDL < 130     Lab Results   Component Value Date    LDL 70 02/15/2023             Passed - In person appointment or virtual visit in the past 12 mos or appointment in next 3 mos     Recent Outpatient Visits              1 month ago Lumbar stenosis with neurogenic claudication    [facility], [address], [place], [place]    Office Visit    1 month ago Type 2 diabetes mellitus without complication, with long-term current use of insulin [facility])    David Muse, Richard Green MD    Office Visit    2 months ago Primary central sleep apnea    Richard Lynch MD    Office Visit    4 months ago Uncontrolled type 2 diabetes mellitus with hyperglycemia [facility])    David Muse, Richard Green MD    Office Visit    4 months ago Medicare annual wellness visit, subsequent    David Muse Venancio Curls, MD    Office Visit          Future Appointments         Provider Department Appt Notes    In 3 weeks MD Bettina Brunson [place] follow up    In 1 month [address] no pa req                  Future Appointments         Provider Department Appt Notes    In 3 weeks MD Bettina Brunson [place] follow up    In 1 month [address] [facility] [address] no pa req           Recent Outpatient Visits              1 month ago Lumbar stenosis with neurogenic claudication    [facility], [address], [place], [place]    Office Visit    1 month ago Type 2 diabetes mellitus without complication, with long-term current use of insulin [facility])    [address], [address], Chris Wood MD    Office Visit    2 months ago Primary central sleep apnea    Tere Leo MD    Office Visit    4 months ago Uncontrolled type 2 diabetes mellitus with hyperglycemia [facility])    [address], [place] [address], Chris Wood MD    Office Visit    4 months ago Medicare annual wellness visit, subsequent    [address] [facility], Chris Wood MD    Office Visit

## 2023-06-26 NOTE — TELEPHONE ENCOUNTER
Refill passed per [place], [facility] protocol.     Requested Prescriptions   Pending Prescriptions Disp Refills    ATORVASTATIN 40 MG Oral Tab [Pharmacy Med Name: ATORVASTATIN 40MG TABLETS] 90 tablet 1     Sig: TAKE 1 TABLET(40 MG) BY MOUTH EVERY NIGHT       Cholesterol Medication Protocol Passed - 6/25/2023  9:08 AM        Passed - ALT in past 12 months        Passed - LDL in past 12 months        Passed - Last ALT < 80     Lab Results   Component Value Date    ALT 43 02/15/2023             Passed - Last LDL < 130     Lab Results   Component Value Date    LDL 70 02/15/2023             Passed - In person appointment or virtual visit in the past 12 mos or appointment in next 3 mos     Recent Outpatient Visits              1 month ago Lumbar stenosis with neurogenic claudication    [facility], [address], [place], [place]    Office Visit    1 month ago Type 2 diabetes mellitus without complication, with long-term current use of insulin [facility])    [address], [place], Rosa Alegria MD    Office Visit    2 months ago Primary central sleep apnea    [address] [facility], Rosa Alegria MD    Office Visit    4 months ago Uncontrolled type 2 diabetes mellitus with hyperglycemia [facility])    [address], [place], Rosa Alegria MD    Office Visit    4 months ago Medicare annual wellness visit, subsequent    [address], [place], Rosa Alegria MD    Office Visit          Future Appointments         Provider Department Appt Notes    In 3 weeks Lisa Simon MD [address] [facility], [place] follow up    In 1 month [address] [facility] [id number] no pa req                 LOSARTAN 25 MG Oral Tab [Pharmacy Med Name: LOSARTAN 25MG TABLETS] 90 tablet 1     Sig: TAKE 1 TABLET(25 MG) BY MOUTH DAILY       Hypertensive Medications Protocol Failed - 6/25/2023  9:08 AM        Failed - Last BP reading less than 140/90     BP Readings from Last 1 Encounters:  05/25/23 : 140/72              Passed - In person appointment in the past 12 or next 3 months     Recent Outpatient Visits              1 month ago Lumbar stenosis with neurogenic claudication    [facility], [address], [place], [place]    Office Visit    1 month ago Type 2 diabetes mellitus without complication, with long-term current use of insulin [facility])    [address], [address], Velia Meyer MD    Office Visit    2 months ago Primary central sleep apnea    [address] [facility], Velia Meyer MD    Office Visit    4 months ago Uncontrolled type 2 diabetes mellitus with hyperglycemia [facility])    [address], [place], Velia Meyer MD    Office Visit    4 months ago Medicare annual wellness visit, subsequent    [address], [place], Velia Meyer MD    Office Visit          Future Appointments         Provider Department Appt Notes    In 3 weeks Helio Shankar MD [address] [facility], [place] follow up    In 1 month [address] [facility] [id number] no pa req               Passed - CMP or BMP in past 6 months     Recent Results (from the past 4392 hour(s))   COMP METABOLIC PANEL (14)    Collection Time: 02/15/23  8:52 AM   Result Value Ref Range    Glucose 87 70 - 99 mg/dL    Sodium 143 136 - 145 mmol/L    Potassium 3.9 3.5 - 5.1 mmol/L    Chloride 109 98 - 112 mmol/L    CO2 27.0 21.0 - 32.0 mmol/L    Anion Gap 7 0 - 18 mmol/L    BUN 11 7 - 18 mg/dL    Creatinine 0.86 0.70 - 1.30 mg/dL    BUN/CREA Ratio 12.8 10.0 - 20.0    Calcium, Total 9.2 8.5 - 10.1 mg/dL    Calculated Osmolality 295 275 - 295 mOsm/kg    eGFR-Cr 95 >=60 mL/min/1.73m2    ALT 43 16 - 61 U/L    AST 17 15 - 37 U/L Alkaline Phosphatase 76 45 - 117 U/L    Bilirubin, Total 0.5 0.1 - 2.0 mg/dL    Total Protein 7.5 6.4 - 8.2 g/dL    Albumin 3.6 3.4 - 5.0 g/dL    Globulin  3.9 2.8 - 4.4 g/dL    A/G Ratio 0.9 (L) 1.0 - 2.0    Patient Fasting for CMP? Yes      *Note: Due to a large number of results and/or encounters for the requested time period, some results have not been displayed. A complete set of results can be found in Results Review.                Passed - In person appointment or virtual visit in the past 6 months     Recent Outpatient Visits              1 month ago Lumbar stenosis with neurogenic claudication    [facility], [address], [place], [place]    Office Visit    1 month ago Type 2 diabetes mellitus without complication, with long-term current use of insulin [facility])    [place] Shepherd Driscilla Salvo, MD    Office Visit    2 months ago Primary central sleep apnea    [address] [place], [place] [address]Betty MD    Office Visit    4 months ago Uncontrolled type 2 diabetes mellitus with hyperglycemia [facility])    [place] Driscilla Salvo, MD    Office Visit    4 months ago Medicare annual wellness visit, subsequent    [place] Shepherd Driscilla Salvo, MD    Office Visit          Future Appointments         Provider Department Appt Notes    In 3 weeks Olesya Houston MD [address] [place] [address] W[place] Martinez follow up    In 1 month [address] [facility] [id number] no pa req               Passed - [facility] or GFRNAA > 50     GFR Evaluation  EGFRCR: 95 , resulted on 2/15/2023             Future Appointments         Provider Department Appt Notes    In 3 weeks Olesya Houston MD [address] [place], [place] [address]W[place] follow up    In 1 month [address] [facility] [id number] no pa req Recent Outpatient Visits              1 month ago Lumbar stenosis with neurogenic claudication    [facility], [address], [place], [place]    Office Visit    1 month ago Type 2 diabetes mellitus without complication, with long-term current use of insulin [facility])    [address], [address], Federica Mcdonnell MD    Office Visit    2 months ago Primary central sleep apnea    Ruiz Monson MD    Office Visit    4 months ago Uncontrolled type 2 diabetes mellitus with hyperglycemia [facility])    [address], [address], Federica Mcdonnell MD    Office Visit    4 months ago Medicare annual wellness visit, subsequent    Federica Beach MD    Office Visit

## 2023-07-17 NOTE — TELEPHONE ENCOUNTER
Pt is requesting refill for the following medication        AMITRIPTYLINE 50 MG Oral Tab, TAKE 1 TABLET(50 MG) BY MOUTH EVERY NIGHT, Disp: 90 tablet, Rfl: 1

## 2023-07-18 RX ORDER — AMITRIPTYLINE HYDROCHLORIDE 50 MG/1
50 TABLET, FILM COATED ORAL NIGHTLY
Qty: 90 TABLET | Refills: 1 | Status: SHIPPED | OUTPATIENT
Start: 2023-07-18

## 2023-07-18 NOTE — TELEPHONE ENCOUNTER
Review pended refill request as it does not fall under a protocol. Last Rx:5/22/23    Requested Prescriptions   Pending Prescriptions Disp Refills    amitriptyline 50 MG Oral Tab 90 tablet 3     Sig: Take 1 tablet (50 mg total) by mouth nightly.        There is no refill protocol information for this order

## 2023-07-26 DIAGNOSIS — G62.9 POLYNEUROPATHY: ICD-10-CM

## 2023-07-26 DIAGNOSIS — M48.062 LUMBAR STENOSIS WITH NEUROGENIC CLAUDICATION: ICD-10-CM

## 2023-07-26 NOTE — TELEPHONE ENCOUNTER
Refill Request    Medication request: pregabalin 75 MG Oral Cap   1 cap PO at bedtime x1 week, then 1 cap PO BID     LOV:5/25/2023 Kanchan Schmidt, DO   Due back to clinic per last office note: \"He should follow-up with me in 2 months for reassessment. \"  NOV: Visit date not found      [place]: 5/25/2023 #60    Urine drug screen (if applicable): None  Pain contract: None    LOV plan (if weaning or changing medications): Per Dr. Oneyda Marquez notes: \"I recommend adding on pregabalin 75 mg nightly for 1 week, then 75 mg twice daily if no side effects and no benefit.  \"

## 2023-07-28 RX ORDER — PREGABALIN 75 MG/1
CAPSULE ORAL
Qty: 60 CAPSULE | Refills: 0 | Status: SHIPPED | OUTPATIENT
Start: 2023-07-28

## 2023-07-31 ENCOUNTER — OFFICE VISIT (OUTPATIENT)
Dept: FAMILY MEDICINE CLINIC | Facility: CLINIC | Age: 68
End: 2023-07-31

## 2023-07-31 VITALS
HEART RATE: 87 BPM | WEIGHT: 248 LBS | BODY MASS INDEX: 39.86 KG/M2 | DIASTOLIC BLOOD PRESSURE: 66 MMHG | SYSTOLIC BLOOD PRESSURE: 104 MMHG | HEIGHT: 66 IN

## 2023-07-31 DIAGNOSIS — I73.9 INTERMITTENT CLAUDICATION (HCC): Primary | ICD-10-CM

## 2023-07-31 DIAGNOSIS — Z79.4 TYPE 2 DIABETES MELLITUS WITHOUT COMPLICATION, WITH LONG-TERM CURRENT USE OF INSULIN (HCC): ICD-10-CM

## 2023-07-31 DIAGNOSIS — E11.9 TYPE 2 DIABETES MELLITUS WITHOUT COMPLICATION, WITH LONG-TERM CURRENT USE OF INSULIN (HCC): ICD-10-CM

## 2023-07-31 PROCEDURE — 1125F AMNT PAIN NOTED PAIN PRSNT: CPT | Performed by: FAMILY MEDICINE

## 2023-07-31 PROCEDURE — 3008F BODY MASS INDEX DOCD: CPT | Performed by: FAMILY MEDICINE

## 2023-07-31 PROCEDURE — 3078F DIAST BP <80 MM HG: CPT | Performed by: FAMILY MEDICINE

## 2023-07-31 PROCEDURE — 3074F SYST BP LT 130 MM HG: CPT | Performed by: FAMILY MEDICINE

## 2023-07-31 PROCEDURE — 99213 OFFICE O/P EST LOW 20 MIN: CPT | Performed by: FAMILY MEDICINE

## 2023-07-31 RX ORDER — INSULIN LISPRO 100 [IU]/ML
INJECTION, SUSPENSION SUBCUTANEOUS
Qty: 8 EACH | Refills: 2 | Status: SHIPPED | OUTPATIENT
Start: 2023-07-31

## 2023-08-08 DIAGNOSIS — E11.65 UNCONTROLLED TYPE 2 DIABETES MELLITUS WITH HYPERGLYCEMIA (HCC): ICD-10-CM

## 2023-08-09 RX ORDER — BLOOD SUGAR DIAGNOSTIC
STRIP MISCELLANEOUS
Qty: 200 STRIP | Refills: 3 | Status: SHIPPED | OUTPATIENT
Start: 2023-08-09

## 2023-08-09 NOTE — TELEPHONE ENCOUNTER
Refill passed per [address] [facility] Sharl[place] protocol    Requested Prescriptions   Pending Prescriptions Disp Refills    Glucose Blood (ACCU-CHEK DAXA PLUS) In Vitro Strip [place] Med Name: [address] [facility] [address] [facility] [address]] 200 strip 1     Sig: TEST TWICE DAILY       Diabetic Supplies Protocol Passed - 8/9/2023  2:56 PM        Passed - In person appointment or virtual visit in the past 12 mos or appointment in next 3 mos     Recent Outpatient Visits              1 week ago Intermittent claudication [facility])    [address], [place], Fab Braswell MD    Office Visit    2 months ago Lumbar stenosis with neurogenic claudication    [facility], [address], [place], [place]    Office Visit    2 months ago Type 2 diabetes mellitus without complication, with long-term current use of insulin [facility])    [address], [place], Fab Braswell MD    Office Visit    4 months ago Primary central sleep apnea    [address] [facility], Fab Braswell MD    Office Visit    5 months ago Uncontrolled type 2 diabetes mellitus with hyperglycemia [facility])    [address], [place], Fab Braswell MD    Office Visit          Future Appointments         Provider Department Appt Notes    In 3 weeks Stephane Marquez MD [facility] [place] Dr. Marry Man referral

## 2023-09-22 DIAGNOSIS — G62.9 POLYNEUROPATHY: ICD-10-CM

## 2023-09-22 DIAGNOSIS — M48.062 LUMBAR STENOSIS WITH NEUROGENIC CLAUDICATION: ICD-10-CM

## 2023-09-23 NOTE — TELEPHONE ENCOUNTER
Medication request: Pregabalin 75 mg oral cap. Take 1 capsule by mouth at bedtime for 1 week then take 1 capsule by mouth twice daily. #60. No refills. LOV: 5/25/2023  Due back to office: He should follow-up with me in 2 months for reassessment. NOV: None Left message to call office to schedule a follow up appointment. [place]: 7/28/2023 #60    LOV plan;  I recommend adding on pregabalin 75 mg nightly for 1 week, then 75 mg twice daily if no side effects and no benefit.

## 2023-09-25 RX ORDER — PREGABALIN 75 MG/1
75 CAPSULE ORAL 2 TIMES DAILY
Qty: 60 CAPSULE | Refills: 0 | Status: SHIPPED | OUTPATIENT
Start: 2023-09-25

## 2023-10-12 ENCOUNTER — OFFICE VISIT (OUTPATIENT)
Dept: PHYSICAL MEDICINE AND REHAB | Facility: CLINIC | Age: 68
End: 2023-10-12
Payer: COMMERCIAL

## 2023-10-12 DIAGNOSIS — M48.062 LUMBAR STENOSIS WITH NEUROGENIC CLAUDICATION: Primary | ICD-10-CM

## 2023-10-12 DIAGNOSIS — G62.9 POLYNEUROPATHY: ICD-10-CM

## 2023-10-12 PROCEDURE — 99214 OFFICE O/P EST MOD 30 MIN: CPT | Performed by: PHYSICAL MEDICINE & REHABILITATION

## 2023-10-12 PROCEDURE — 1159F MED LIST DOCD IN RCRD: CPT | Performed by: PHYSICAL MEDICINE & REHABILITATION

## 2023-10-12 PROCEDURE — 1160F RVW MEDS BY RX/DR IN RCRD: CPT | Performed by: PHYSICAL MEDICINE & REHABILITATION

## 2023-10-12 RX ORDER — PREGABALIN 75 MG/1
75 CAPSULE ORAL 3 TIMES DAILY
Qty: 90 CAPSULE | Refills: 0 | Status: SHIPPED | OUTPATIENT
Start: 2023-10-12

## 2023-10-15 NOTE — PROGRESS NOTES
Progress note    C/C: Patient presents with:  Leg Pain: LOV: 5/25/2023 pt comes in with b/l low back aching pain that radiates into b/l lateral legs. Admits T/N in b/l legs. Admits weakness. Rates pain 8/10. Takes lyrica BID, amitriptyline 50 MG nightly. HPI: 42-year-old male presents for follow-up. He has had worsening bilateral lower back and leg pain, leg pains worse than back pain. Leg pain is constant. Back is intermittent. Back and leg pain report worsening with standing and walking. Has been taking Lyrica 75 mg twice daily, amitriptyline 50 mg at nighttime. Pertinent allergies: No Known Allergies     Physical exam:    Lumbar spine exam:    No skin rash lumbar/upper sacral region  No pain with lumbar flexion. No pain with lumbar extension. 5/5 LE strength b/l  SILT b/l LE  2/4 quad, gastrocs reflexes b/l  SLR negative b/l    Imaging: No new imaging to review    Assessment and plan  Lumbar stenosis with neurogenic claudication, walking and pain worsening. Strength and sensation preserved. H/o DM    We discussed treatment options, including increasing the pregabalin, repeating bilateral L5 TFESI under fluoroscopy, neurosurgical referral. Has done PT, no improvement. Defers neurosurgical referral. He elected to increase the pregabalin; he will take 75mg TID. Continue current dose of amitriptyline. F/u in 3-6 months, or earlier PRN.      Rafita Ramey DO  Physical Medicine and [address] [place] [address]

## 2023-10-16 ENCOUNTER — MED REC SCAN ONLY (OUTPATIENT)
Dept: FAMILY MEDICINE CLINIC | Facility: CLINIC | Age: 68
End: 2023-10-16

## 2023-10-19 RX ORDER — CELECOXIB 200 MG/1
200 CAPSULE ORAL DAILY
Qty: 90 CAPSULE | Refills: 1 | Status: SHIPPED | OUTPATIENT
Start: 2023-10-19 | End: 2023-10-25

## 2023-10-19 NOTE — TELEPHONE ENCOUNTER
Current Outpatient Medications:     celecoxib (CELEBREX) 200 MG Oral Cap, Take 1 capsule (200 mg total) by mouth daily., Disp: 90 capsule, Rfl: 1

## 2023-10-19 NOTE — TELEPHONE ENCOUNTER
Refill pass per protocol    Requested Prescriptions   Pending Prescriptions Disp Refills    celecoxib (CELEBREX) 200 MG Oral Cap 90 capsule 1     Sig: Take 1 capsule (200 mg total) by mouth daily.       Non-Narcotic Pain Medication Protocol Passed - 10/19/2023 11:50 AM        Passed - In person appointment or virtual visit in the past 6 mos or appointment in next 3 mos     Recent Outpatient Visits              1 week ago Lumbar stenosis with neurogenic claudication    [facility]    Office Visit    2 months ago Intermittent claudication (HCC)    [facility] [address]    Office Visit    4 months ago Lumbar stenosis with neurogenic claudication    [facility]    Office Visit    5 months ago Type 2 diabetes mellitus without complication, with long-term current use of insulin ([facility])    [facility]    Office Visit    6 months ago Primary central sleep apnea    [facility] [facility]    Office Visit          Future Appointments         Provider Department Appt Notes    In 6 days [facility] [place] 3 month follow up on diabetes policy informed to pt

## 2023-10-23 ENCOUNTER — MED REC SCAN ONLY (OUTPATIENT)
Dept: FAMILY MEDICINE CLINIC | Facility: CLINIC | Age: 68
End: 2023-10-23

## 2023-10-25 ENCOUNTER — HOSPITAL ENCOUNTER (OUTPATIENT)
Dept: GENERAL RADIOLOGY | Age: 68
Discharge: HOME | End: 2023-10-25
Attending: FAMILY MEDICINE

## 2023-10-25 ENCOUNTER — OFFICE VISIT (OUTPATIENT)
Dept: FAMILY MEDICINE CLINIC | Facility: CLINIC | Age: 68
End: 2023-10-25

## 2023-10-25 VITALS
HEIGHT: 66 IN | BODY MASS INDEX: 38.89 KG/M2 | DIASTOLIC BLOOD PRESSURE: 81 MMHG | WEIGHT: 242 LBS | HEART RATE: 67 BPM | SYSTOLIC BLOOD PRESSURE: 133 MMHG

## 2023-10-25 DIAGNOSIS — M25.561 CHRONIC PAIN OF RIGHT KNEE: Primary | ICD-10-CM

## 2023-10-25 DIAGNOSIS — M25.561 CHRONIC PAIN OF RIGHT KNEE: ICD-10-CM

## 2023-10-25 DIAGNOSIS — G89.29 CHRONIC PAIN OF RIGHT KNEE: ICD-10-CM

## 2023-10-25 DIAGNOSIS — G89.29 CHRONIC PAIN OF RIGHT KNEE: Primary | ICD-10-CM

## 2023-10-25 DIAGNOSIS — L72.3 SEBACEOUS CYST: ICD-10-CM

## 2023-10-25 PROCEDURE — 1159F MED LIST DOCD IN RCRD: CPT | Performed by: FAMILY MEDICINE

## 2023-10-25 PROCEDURE — 73562 X-RAY EXAM OF KNEE 3: CPT | Performed by: FAMILY MEDICINE

## 2023-10-25 PROCEDURE — 3079F DIAST BP 80-89 MM HG: CPT | Performed by: FAMILY MEDICINE

## 2023-10-25 PROCEDURE — 3008F BODY MASS INDEX DOCD: CPT | Performed by: FAMILY MEDICINE

## 2023-10-25 PROCEDURE — 99213 OFFICE O/P EST LOW 20 MIN: CPT | Performed by: FAMILY MEDICINE

## 2023-10-25 PROCEDURE — 1125F AMNT PAIN NOTED PAIN PRSNT: CPT | Performed by: FAMILY MEDICINE

## 2023-10-25 PROCEDURE — 3075F SYST BP GE 130 - 139MM HG: CPT | Performed by: FAMILY MEDICINE

## 2023-10-25 RX ORDER — METOPROLOL SUCCINATE 25 MG/1
25 TABLET, EXTENDED RELEASE ORAL DAILY
Qty: 90 TABLET | Refills: 1 | Status: SHIPPED | OUTPATIENT
Start: 2023-10-25

## 2023-10-25 RX ORDER — CELECOXIB 200 MG/1
200 CAPSULE ORAL DAILY
Qty: 90 CAPSULE | Refills: 1 | Status: SHIPPED | OUTPATIENT
Start: 2023-10-25

## 2023-10-25 RX ORDER — AMITRIPTYLINE HYDROCHLORIDE 50 MG/1
50 TABLET, FILM COATED ORAL NIGHTLY
Qty: 90 TABLET | Refills: 1 | Status: SHIPPED | OUTPATIENT
Start: 2023-10-25

## 2023-10-25 RX ORDER — LOSARTAN POTASSIUM 25 MG/1
25 TABLET ORAL DAILY
Qty: 90 TABLET | Refills: 3 | Status: SHIPPED | OUTPATIENT
Start: 2023-10-25

## 2023-10-25 RX ORDER — DAPAGLIFLOZIN AND METFORMIN HYDROCHLORIDE 5; 1000 MG/1; MG/1
1 TABLET, FILM COATED, EXTENDED RELEASE ORAL 2 TIMES DAILY
Qty: 180 TABLET | Refills: 1 | Status: SHIPPED | OUTPATIENT
Start: 2023-10-25

## 2023-10-25 RX ORDER — DULAGLUTIDE 1.5 MG/.5ML
1.5 INJECTION, SOLUTION SUBCUTANEOUS WEEKLY
Qty: 18 ML | Refills: 1 | Status: SHIPPED | OUTPATIENT
Start: 2023-10-25

## 2023-10-25 RX ORDER — TAMSULOSIN HYDROCHLORIDE 0.4 MG/1
CAPSULE ORAL
Qty: 90 CAPSULE | Refills: 2 | Status: SHIPPED | OUTPATIENT
Start: 2023-10-25

## 2023-10-25 RX ORDER — ATORVASTATIN CALCIUM 40 MG/1
40 TABLET, FILM COATED ORAL NIGHTLY
Qty: 90 TABLET | Refills: 3 | Status: SHIPPED | OUTPATIENT
Start: 2023-10-25

## 2023-10-25 RX ORDER — INSULIN LISPRO 100 [IU]/ML
INJECTION, SUSPENSION SUBCUTANEOUS
Qty: 8 EACH | Refills: 2 | Status: SHIPPED | OUTPATIENT
Start: 2023-10-25

## 2023-10-30 ENCOUNTER — OFFICE VISIT (OUTPATIENT)
Dept: FAMILY MEDICINE CLINIC | Facility: CLINIC | Age: 68
End: 2023-10-30

## 2023-10-30 VITALS
WEIGHT: 242 LBS | BODY MASS INDEX: 38.89 KG/M2 | DIASTOLIC BLOOD PRESSURE: 72 MMHG | SYSTOLIC BLOOD PRESSURE: 139 MMHG | HEART RATE: 81 BPM | HEIGHT: 66 IN

## 2023-10-30 DIAGNOSIS — M17.11 PRIMARY OSTEOARTHRITIS OF RIGHT KNEE: Primary | ICD-10-CM

## 2023-10-30 RX ORDER — METHYLPREDNISOLONE ACETATE 40 MG/ML
80 INJECTION, SUSPENSION INTRA-ARTICULAR; INTRALESIONAL; INTRAMUSCULAR; SOFT TISSUE ONCE
Status: COMPLETED | OUTPATIENT
Start: 2023-10-30 | End: 2023-10-30

## 2023-10-30 RX ORDER — METHYLPREDNISOLONE ACETATE 80 MG/ML
80 INJECTION, SUSPENSION INTRA-ARTICULAR; INTRALESIONAL; INTRAMUSCULAR; SOFT TISSUE ONCE
Status: DISCONTINUED | OUTPATIENT
Start: 2023-10-30 | End: 2023-10-30

## 2023-10-30 RX ADMIN — METHYLPREDNISOLONE ACETATE 80 MG: 40 INJECTION, SUSPENSION INTRA-ARTICULAR; INTRALESIONAL; INTRAMUSCULAR; SOFT TISSUE at 10:05:00

## 2023-12-08 NOTE — TELEPHONE ENCOUNTER
Drug change request      Insulin Pen Needle (TECHLITE PEN NEEDLES) 31G X 8 MM Does not apply Misc Use 4 times daily as directed 400 each 3     Per pharmacy- PT REQUEST 32G 6MM

## 2023-12-09 RX ORDER — PEN NEEDLE, DIABETIC 31 G X1/4"
NEEDLE, DISPOSABLE MISCELLANEOUS
Qty: 400 EACH | Refills: 3 | Status: SHIPPED | OUTPATIENT
Start: 2023-12-09

## 2023-12-09 NOTE — TELEPHONE ENCOUNTER
Refill pass per protocol    Requested Prescriptions   Pending Prescriptions Disp Refills    Insulin Pen Needle (TECHLITE PEN NEEDLES) 32G X 6 MM Does not apply Misc 400 each 3     Sig: Use as directed 4 times daily       Diabetic Supplies Protocol Passed - 12/9/2023 12:45 PM        Passed - In person appointment or virtual visit in the past 12 mos or appointment in next 3 mos     Recent Outpatient Visits              1 month ago Primary osteoarthritis of right knee    [facility]    Office Visit    1 month ago Chronic pain of right knee    [facility]    Office Visit    1 month ago Lumbar stenosis with neurogenic claudication    [facility]    Office Visit    4 months ago Intermittent claudication (HCC)    [facility]    Office Visit    6 months ago Lumbar stenosis with neurogenic claudication    [facility]    Office Visit

## 2024-01-17 DIAGNOSIS — E11.65 UNCONTROLLED TYPE 2 DIABETES MELLITUS WITH HYPERGLYCEMIA (HCC): ICD-10-CM

## 2024-01-17 RX ORDER — PEN NEEDLE, DIABETIC 31 GX5/16"
NEEDLE, DISPOSABLE MISCELLANEOUS
Qty: 400 EACH | Refills: 3 | OUTPATIENT
Start: 2024-01-17

## 2024-01-17 RX ORDER — LANCETS
1 EACH MISCELLANEOUS 2 TIMES DAILY
Qty: 200 EACH | Refills: 3 | Status: SHIPPED | OUTPATIENT
Start: 2024-01-17

## 2024-01-17 NOTE — TELEPHONE ENCOUNTER
[facility]  Requested Prescriptions   Pending Prescriptions Disp Refills    TECHLITE PEN NEEDLES 31G X 8 MM Does not apply Misc [Pharmacy Med Name: TECHLITE PEN NEEDLES [id number]] 400 each 3     Sig: USE FOUR TIMES DAILY AS DIRECTED       Diabetic Supplies Protocol Passed - 1/17/2024  5:48 AM        Passed - In person appointment or virtual visit in the past 12 mos or appointment in next 3 mos     Recent Outpatient Visits              2 months ago Primary osteoarthritis of right knee    [facility], Austin Mariano MD    Office Visit    2 months ago Chronic pain of right knee    [facility], Austin Mariano MD    Office Visit    3 months ago Lumbar stenosis with neurogenic claudication    [facility]    Office Visit    5 months ago Intermittent claudication (HCC)    [facility]    Office Visit    7 months ago Lumbar stenosis with neurogenic claudication    [facility]    [facility] Visit                        ACCU-CHEK SOFTCLIX LANCETS Does not apply Misc [Pharmacy Med Name: SOFTCLIX LANCETS] 200 each 3     Sig: USE TWICE DAILY TO CHECK BLOOD GLUCOSE       Diabetic Supplies Protocol Passed - 1/17/2024  5:48 AM        Passed - In person appointment or virtual visit in the past 12 mos or appointment in next 3 mos     Recent Outpatient Visits              2 months ago Primary osteoarthritis of right knee    [facility], Austin Mariano MD    Office Visit    2 months ago Chronic pain of right knee    [facility]    Office Visit    3 months ago Lumbar stenosis with neurogenic claudication    [facility], Rojelio Hawk DO    Office Visit    5 months ago Intermittent claudication (HCC)    [facility], [address], Austin Mariano MD    Office Visit    7 months ago Lumbar stenosis with neurogenic claudication    [facility], [facility], Rojelio Hawk DO    Office Visit                           Recent Outpatient Visits              2 months ago Primary osteoarthritis of right knee    [facility], [address], Austin Mariano MD    Office Visit    2 months ago Chronic pain of right knee    [facility], [address], Austin Mariano MD    Office Visit    3 months ago Lumbar stenosis with neurogenic claudication    [facility], [facility], Rojelio Hawk DO    Office Visit    5 months ago Intermittent claudication (HCC)    [facility], [address], Austin Mariano MD    Office Visit    7 months ago Lumbar stenosis with neurogenic claudication    [facility], [facility], Rojelio Hawk DO    Office Visit

## 2024-01-29 RX ORDER — INSULIN LISPRO 100 [IU]/ML
INJECTION, SUSPENSION SUBCUTANEOUS
Qty: 24 ML | Refills: 0 | Status: SHIPPED | OUTPATIENT
Start: 2024-01-29

## 2024-01-29 NOTE — TELEPHONE ENCOUNTER
Please review; protocol failed/ No protocol     Requested Prescriptions   Pending Prescriptions Disp Refills    HUMALOG MIX 75/25 KWIKPEN (75-25) 100 UNIT/ML Subcutaneous Suspension Pen-injector [Pharmacy Med Name: HUMALOG MIX 75/25 KWIKPEN INJ 3ML] 24 mL 0     Sig: INJECT 44 UNITS UNDER THE SKIN EVERY MORNING AND 42 UNITS EVERY EVENING       Diabetes Medication Protocol Failed - 1/27/2024  1:31 PM        Failed - Last A1C < 7.5 and within past 6 months     Lab Results   Component Value Date    A1C 7.6 (A) 05/22/2023             Passed - In person appointment or virtual visit in the past 6 mos or appointment in next 3 mos     Recent Outpatient Visits              3 months ago Primary osteoarthritis of right knee    [facility], Austin Mariano MD    Office Visit    3 months ago Chronic pain of right knee    [facility], Austin Mariano MD    Office Visit    3 months ago Lumbar stenosis with neurogenic claudication    [facility]    Office Visit    6 months ago Intermittent claudication (HCC)    [facility], Autsin Mariano MD    Office Visit    8 months ago Lumbar stenosis with neurogenic claudication    [facility]    [facility] Visit                      Passed - EGFRCR or GFRNAA > 50     GFR Evaluation  EGFRCR: 95 , resulted on 2/15/2023          Passed - GFR in the past 12 months             Recent Outpatient Visits              3 months ago Primary osteoarthritis of right knee    [facility], Austin Mariano MD    Office Visit    3 months ago Chronic pain of right knee    [facility], Austin Mariano MD    Office Visit    3 months ago Lumbar stenosis with neurogenic claudication    [facility], Rojelio Hawk DO    Office Visit    6 months ago Intermittent claudication (HCC)    [facility] [address], Austin Mariano MD    Office Visit    8 months ago Lumbar stenosis with neurogenic claudication    [facility], Rojeloi Hawk DO    Office Visit

## 2024-02-26 RX ORDER — INSULIN LISPRO 100 [IU]/ML
INJECTION, SUSPENSION SUBCUTANEOUS
Qty: 24 ML | Refills: 3 | Status: SHIPPED | OUTPATIENT
Start: 2024-02-26

## 2024-02-26 NOTE — TELEPHONE ENCOUNTER
Please review. Protocol Failed or has No Protocol.    Requested Prescriptions   Pending Prescriptions Disp Refills    HUMALOG MIX 75/25 KWIKPEN (75-25) 100 UNIT/ML Subcutaneous Suspension Pen-injector [Pharmacy Med Name: HUMALOG MIX 75/25 KWIKPEN INJ 3ML] 24 mL 0     Sig: INJECT 44 UNITS UNDER THE SKIN EVERY MORNING AND 42 UNITS EVERY EVENING       Diabetes Medication Protocol Failed - 2/24/2024  1:37 PM        Failed - Last A1C < 7.5 and within past 6 months     Lab Results   Component Value Date    A1C 7.6 (A) 05/22/2023             Failed - EGFRCR or GFRNAA > 50     GFR Evaluation            Failed - GFR in the past 12 months        Passed - In person appointment or virtual visit in the past 6 mos or appointment in next 3 mos     Recent Outpatient Visits              3 months ago Primary osteoarthritis of right knee    [facility], Austin Mariano MD    Office Visit    4 months ago Chronic pain of right knee    [facility], Austin Mariano MD    Office Visit    4 months ago Lumbar stenosis with neurogenic claudication    [facility]    Office Visit    7 months ago Intermittent claudication (HCC)    [facility], Austin Mariano MD    Office Visit    9 months ago Lumbar stenosis with neurogenic claudication    [facility]    [facility]          Future Appointments         Provider Department Appt Notes    In 1 week Austin Clarke MD [facility] [place] Follow up due for AWV               Passed - Microalbumin procedure in past 12 months or taking ACE/ARB             Recent Outpatient Visits              3 months ago Primary osteoarthritis of right knee    [facility], Austin Mariano MD     Office Visit    4 months ago Chronic pain of right knee    [facility], Austin Mariano MD    Office Visit    4 months ago Lumbar stenosis with neurogenic claudication    [facility], Rojelio Hawk DO    Office Visit    7 months ago Intermittent claudication (HCC)    [facility]    Office Visit    9 months ago Lumbar stenosis with neurogenic claudication    [facility], Rojelio Hawk DO    Office Visit            Future Appointments         Provider Department Appt Notes    In 1 week Austin Clarke MD [facility], W[place] Follow up due for AWV

## 2024-03-01 DIAGNOSIS — G62.9 POLYNEUROPATHY: ICD-10-CM

## 2024-03-01 DIAGNOSIS — M48.062 LUMBAR STENOSIS WITH NEUROGENIC CLAUDICATION: ICD-10-CM

## 2024-03-04 ENCOUNTER — OFFICE VISIT (OUTPATIENT)
Dept: FAMILY MEDICINE CLINIC | Facility: CLINIC | Age: 69
End: 2024-03-04

## 2024-03-04 VITALS
SYSTOLIC BLOOD PRESSURE: 125 MMHG | DIASTOLIC BLOOD PRESSURE: 70 MMHG | WEIGHT: 245.38 LBS | BODY MASS INDEX: 39.43 KG/M2 | HEART RATE: 65 BPM | HEIGHT: 66 IN

## 2024-03-04 DIAGNOSIS — G62.9 POLYNEUROPATHY: ICD-10-CM

## 2024-03-04 DIAGNOSIS — G47.31 PRIMARY CENTRAL SLEEP APNEA: ICD-10-CM

## 2024-03-04 DIAGNOSIS — I10 ESSENTIAL HYPERTENSION: ICD-10-CM

## 2024-03-04 DIAGNOSIS — R35.1 NOCTURIA: ICD-10-CM

## 2024-03-04 DIAGNOSIS — E11.42 TYPE 2 DIABETES MELLITUS WITH DIABETIC POLYNEUROPATHY, WITH LONG-TERM CURRENT USE OF INSULIN (HCC): ICD-10-CM

## 2024-03-04 DIAGNOSIS — I73.9 INTERMITTENT CLAUDICATION (HCC): ICD-10-CM

## 2024-03-04 DIAGNOSIS — M43.16 SPONDYLOLISTHESIS OF LUMBAR REGION: ICD-10-CM

## 2024-03-04 DIAGNOSIS — E66.01 SEVERE OBESITY (BMI 35.0-39.9) WITH COMORBIDITY (HCC): ICD-10-CM

## 2024-03-04 DIAGNOSIS — K21.9 GASTROESOPHAGEAL REFLUX DISEASE WITHOUT ESOPHAGITIS: ICD-10-CM

## 2024-03-04 DIAGNOSIS — Z91.81 RISK FOR FALLS: ICD-10-CM

## 2024-03-04 DIAGNOSIS — N40.1 BENIGN PROSTATIC HYPERPLASIA WITH NOCTURIA: ICD-10-CM

## 2024-03-04 DIAGNOSIS — Z00.00 MEDICARE ANNUAL WELLNESS VISIT, SUBSEQUENT: Primary | ICD-10-CM

## 2024-03-04 DIAGNOSIS — E55.9 HYPOVITAMINOSIS D: ICD-10-CM

## 2024-03-04 DIAGNOSIS — Z79.4 TYPE 2 DIABETES MELLITUS WITH DIABETIC POLYNEUROPATHY, WITH LONG-TERM CURRENT USE OF INSULIN (HCC): ICD-10-CM

## 2024-03-04 DIAGNOSIS — R35.1 BENIGN PROSTATIC HYPERPLASIA WITH NOCTURIA: ICD-10-CM

## 2024-03-04 DIAGNOSIS — E78.00 PURE HYPERCHOLESTEROLEMIA: ICD-10-CM

## 2024-03-04 DIAGNOSIS — Z12.11 COLON CANCER SCREENING: ICD-10-CM

## 2024-03-04 DIAGNOSIS — M48.062 LUMBAR STENOSIS WITH NEUROGENIC CLAUDICATION: ICD-10-CM

## 2024-03-04 DIAGNOSIS — M85.88 OSTEOPENIA OF LUMBAR SPINE: ICD-10-CM

## 2024-03-04 DIAGNOSIS — K59.01 SLOW TRANSIT CONSTIPATION: ICD-10-CM

## 2024-03-04 LAB
CARTRIDGE LOT#: ABNORMAL NUMERIC
HEMOGLOBIN A1C: 9 % (ref 4.3–5.6)

## 2024-03-04 RX ORDER — INSULIN LISPRO 100 [IU]/ML
INJECTION, SUSPENSION SUBCUTANEOUS
Qty: 24 ML | Refills: 3 | Status: SHIPPED | OUTPATIENT
Start: 2024-03-04

## 2024-03-04 RX ORDER — PREGABALIN 75 MG/1
75 CAPSULE ORAL 3 TIMES DAILY
Qty: 90 CAPSULE | Refills: 0 | Status: SHIPPED | OUTPATIENT
Start: 2024-03-04

## 2024-03-04 RX ORDER — DAPAGLIFLOZIN AND METFORMIN HYDROCHLORIDE 5; 1000 MG/1; MG/1
1 TABLET, FILM COATED, EXTENDED RELEASE ORAL 2 TIMES DAILY
Qty: 180 TABLET | Refills: 1 | Status: SHIPPED | OUTPATIENT
Start: 2024-03-04

## 2024-03-04 RX ORDER — LOSARTAN POTASSIUM 25 MG/1
25 TABLET ORAL DAILY
Qty: 90 TABLET | Refills: 3 | Status: SHIPPED | OUTPATIENT
Start: 2024-03-04

## 2024-03-04 RX ORDER — PREGABALIN 75 MG/1
75 CAPSULE ORAL 3 TIMES DAILY
Qty: 90 CAPSULE | Refills: 0 | OUTPATIENT
Start: 2024-03-04

## 2024-03-04 RX ORDER — DULAGLUTIDE 1.5 MG/.5ML
1.5 INJECTION, SOLUTION SUBCUTANEOUS WEEKLY
Qty: 18 ML | Refills: 1 | Status: SHIPPED | OUTPATIENT
Start: 2024-03-04

## 2024-03-04 RX ORDER — METOPROLOL SUCCINATE 25 MG/1
25 TABLET, EXTENDED RELEASE ORAL DAILY
Qty: 90 TABLET | Refills: 1 | Status: SHIPPED | OUTPATIENT
Start: 2024-03-04

## 2024-03-04 RX ORDER — ATORVASTATIN CALCIUM 40 MG/1
40 TABLET, FILM COATED ORAL NIGHTLY
Qty: 90 TABLET | Refills: 3 | Status: SHIPPED | OUTPATIENT
Start: 2024-03-04

## 2024-03-04 NOTE — PROGRESS NOTES
Subjective:   Beto Andrea is a 68 year old male who presents for a Medicare Subsequent Annual Wellness visit (Pt already had Initial Annual Wellness) and scheduled follow up of multiple significant but stable problems.     Beto Andrea is a 68 year old male is here for routine periodic Medicare health screening and examination.  His last physical exam was last year.  His last ECG was many years ago and was normal. His last diabetes screening test was 1 years ago and was abnormal: Hx diabetes.   His last cholesterol test was 1 year ago and was abnormal: Hx hyperlipidemia.  Last dentist visit was 12 months ago. He is current with his Td immunization. He also received his COVID vaccines. Patient last colonoscopy was 4 year ago, normal. None smoker.     Patient Beto Andrea is a 68 year old male is here to be evaluated for type 2 diabetes.  Specifically, male has type 2, insulin none requiring diabetes. Compliance with treatment has been good.  Patient's diabetes was first diagnosed 15 years ago.  Patient follows a 2000 calorie ADA diet.  Patient report experiencing the following diabetes related symptoms; Positive for polyuria, Positive for polydipsia, Positive for blurred vision.  Depression symptoms include none.  Tobacco screen: none  smoker.  Current meds include :  oral hypoglycemic include: Xigduo Xr 5-1000mg, Trulicity 1.5 weekly  insulin/injectable : Trulciity weekly, increased Humalog 75/25 44u Q am, 42u Q pm  Hypoglycemia severity is not applicable.he reports home blood glucose readings have been 128 (#s) and believes  having fair glucose control.  Most recent lab results include glycohemoglobin 8.5%, microalbuminuria have been ?.  In regard to preventative care, his last ophthalmology exam was in >2 months ago.  Opthalmic evaluation have shown no pathology.  Concurrent relative health problems include HTN, hyperlipidemia.       History/Other:   Fall Risk Assessment:   He has been screened for  Falls and is High Risk. Fall Prevention information provided to patient in After Visit Summary.    Have you fallen in the last 12 months?: 0-No  Fall/Risk Scorin  Do you feel unsteady when standing or walking?: Yes  Do you worry about falling?: Yes  Have you fallen in the past year?: No     Cognitive Assessment:   He had a completely normal cognitive assessment - see flowsheet entries     Functional Ability/Status:   Beto Andrea has a completely normal functional assessment. See flowsheet for details.      Depression Screening (PHQ-2/PHQ-9): PHQ-2 SCORE: 0  , done 3/4/2024             Advanced Directives:   He does NOT have a Living Will. [Do you have a living will?: No]  He does NOT have a Power of  for Health Care. [Do you have a healthcare power of ?: No]  Discussed Advance Care Planning with patient (and family/surrogate if present). Standard forms made available to patient in After Visit Summary.      Patient Active Problem List   Diagnosis    Uncontrolled type 2 diabetes mellitus with hyperglycemia (HCC)    Severe obesity (BMI 35.0-39.9) with comorbidity (HCC)    Smokers' cough (HCC)    Bilateral back pain    Sciatica associated with disorder of lumbar spine    Numbness and tingling in right hand     Allergies:  He has No Known Allergies.    Current Medications:  Outpatient Medications Marked as Taking for the 3/4/24 encounter (Office Visit) with Austin Clarke MD   Medication Sig    Insulin Lispro Prot & Lispro (HUMALOG MIX 75/25 KWIKPEN) (75-25) 100 UNIT/ML SC SUPN INJECT 44 UNITS UNDER THE SKIN EVERY MORNING AND 42 UNITS EVERY EVENING    Accu-Chek Softclix Lancets Does not apply Misc 1 Lancet by Finger stick route in the morning and 1 Lancet before bedtime.    Insulin Pen Needle (TECHLITE PEN NEEDLES) 32G X 6 MM Does not apply Misc Use as directed 4 times daily    tamsulosin 0.4 MG Oral Cap TAKE 1 CAPSULE BY MOUTH EVERY DAY    losartan 25 MG Oral Tab Take 1 tablet (25 mg total)  by mouth daily.    amitriptyline 50 MG Oral Tab Take 1 tablet (50 mg total) by mouth nightly.    atorvastatin 40 MG Oral Tab Take 1 tablet (40 mg total) by mouth nightly.    Dapagliflozin Pro-metFORMIN ER (XIGDUO XR) 5-1000 MG Oral Tablet 24 Hr Take 1 tablet by mouth 2 (two) times daily.    metoprolol succinate ER 25 MG Oral Tablet 24 Hr Take 1 tablet (25 mg total) by mouth daily.    TRULICITY 1.5 MG/0.5ML Subcutaneous Solution Pen-injector Inject 1.5 mg into the skin once a week.    celecoxib (CELEBREX) 200 MG Oral Cap Take 1 capsule (200 mg total) by mouth daily.    pregabalin 75 MG Oral Cap Take 1 capsule (75 mg total) by mouth 3 (three) times daily.    Glucose Blood (ACCU-CHEK DAXA PLUS) In Vitro Strip TEST TWICE DAILY    tiZANidine 4 MG Oral Tab Take 1 tablet (4 mg total) by mouth every 6 (six) hours as needed.    Alcohol Swabs (ALCOHOL PADS) 70 % Does not apply Pads For use prior to checking sugar or giving insulin    aspirin EC 81 MG Oral Tab EC Take 1 tablet (81 mg total) by mouth daily.       Medical History:  He  has a past medical history of Cataracts, bilateral, Diabetes (HCC), Essential hypertension, High blood pressure, and Hyperlipidemia.  Surgical History:  He  has a past surgical history that includes laminectomy (); ligmt revision,knee,intra-artic (Left); and colonoscopy (N/A, 2020).   Family History:  His family history includes Diabetes in his mother and sister.  Social History:  He  reports that he quit smoking about 30 years ago. His smoking use included cigarettes. He has never used smokeless tobacco. He reports current alcohol use. He reports that he does not use drugs.    Tobacco:  He smoked tobacco in the past but quit greater than 12 months ago.  Social History    Tobacco Use      Smoking status: Former        Years: 20        Types: Cigarettes        Quit date: 6/10/1993        Years since quittin.7      Smokeless tobacco: Never       CAGE Alcohol Screen:   CAGE screening  score of 0 on 3/4/2024, showing low risk of alcohol abuse.      Patient Care Team:  Austin Clarke MD as PCP - General (Family Medicine)  Rojelio Mercedes DO (Physical Medicine)  Anselmo Ghosh, Eldon Baez MD (UROLOGY)    Review of Systems   Constitutional:  Negative for appetite change, fatigue and fever.   HENT:  Negative for hearing loss and nosebleeds.    Eyes:  Negative for pain and visual disturbance.   Respiratory:  Negative for apnea and shortness of breath.    Cardiovascular:  Negative for chest pain, palpitations and leg swelling.   Gastrointestinal:  Negative for abdominal pain, blood in stool, constipation, diarrhea, nausea and vomiting.   Endocrine: Negative for polydipsia and polyuria.   Genitourinary:  Negative for decreased urine volume, frequency and hematuria.        No nocturia   Musculoskeletal:  Positive for back pain. Negative for arthralgias.   Skin:  Negative for rash.   Neurological:  Negative for dizziness, syncope and headaches.   Psychiatric/Behavioral:  Negative for dysphoric mood and sleep disturbance.           Objective:   Physical Exam  Vitals reviewed.   Constitutional:       Appearance: Normal appearance.      Comments:      HENT:      Head: Normocephalic.      Right Ear: Hearing, tympanic membrane and ear canal normal.      Left Ear: Hearing, tympanic membrane and ear canal normal.      Nose: Nose normal. No rhinorrhea.      Mouth/Throat:      Mouth: Mucous membranes are moist.      Pharynx: Oropharynx is clear.   Eyes:      Extraocular Movements: Extraocular movements intact.      Conjunctiva/sclera: Conjunctivae normal.      Pupils: Pupils are equal, round, and reactive to light.   Neck:      Thyroid: No thyroid mass.      Vascular: No carotid bruit.   Cardiovascular:      Rate and Rhythm: Normal rate and regular rhythm.      Heart sounds: Normal heart sounds, S1 normal and S2 normal. No murmur heard.  Pulmonary:      Effort: Pulmonary effort is normal.      Breath sounds:  Normal breath sounds.   Abdominal:      General: Abdomen is flat. Bowel sounds are normal.      Palpations: Abdomen is soft. There is no hepatomegaly, splenomegaly or mass.      Tenderness: There is no abdominal tenderness.      Hernia: No hernia is present.   Musculoskeletal:      Cervical back: Neck supple.      Comments: Spinal exam without scoliosis.      Feet:      Right foot:      Protective Sensation: 10 sites tested.  10 sites sensed.      Left foot:      Protective Sensation: 10 sites tested.  10 sites sensed.   Lymphadenopathy:      Cervical: No cervical adenopathy.   Skin:     General: Skin is warm.      Findings: No rash.   Neurological:      General: No focal deficit present.      Mental Status: He is alert.      Deep Tendon Reflexes:      Reflex Scores:       Patellar reflexes are 2+ on the right side and 2+ on the left side.  Psychiatric:         Attention and Perception: Attention normal.         Mood and Affect: Mood and affect normal.          /70   Pulse 65   Ht 5' 6\" (1.676 m)   Wt 245 lb 6.4 oz (111.3 kg)   BMI 39.61 kg/m²  Estimated body mass index is 39.61 kg/m² as calculated from the following:    Height as of this encounter: 5' 6\" (1.676 m).    Weight as of this encounter: 245 lb 6.4 oz (111.3 kg).    Medicare Hearing Assessment:   Hearing Screening    Screening Method: Questionnaire  I have a problem hearing over the telephone: No I have trouble following the conversations when two or more people are talking at the same time: No   I have trouble understanding things on the TV: No I have to strain to understand conversations: No   I have to worry about missing the telephone ring or doorbell: No I have trouble hearing conversations in a noisy background such as a crowded room or restaurant: No   I get confused about where sounds come from: No I misunderstand some words in a sentence and need to ask people to repeat themselves: No   I especially have trouble understanding the speech  of women and children: No I have trouble understanding the speaker in a large room such as at a meeting or place of Islam: No   Many people I talk to seem to mumble (or don't speak clearly): No People get annoyed because I misunderstand what they say: No   I misunderstand what others are saying and make inappropriate responses: No I avoid social activities because I cannot hear well and fear I will reply improperly: No   Family members and friends have told me they think I may have hearing loss: No             Visual Acuity:   Right Eye Visual Acuity: Uncorrected Right Eye Chart Acuity: 20/25   Left Eye Visual Acuity: Uncorrected Left Eye Chart Acuity: 20/25   Both Eyes Visual Acuity: Uncorrected Both Eyes Chart Acuity: 20/25   Able To Tolerate Visual Acuity: Yes        Assessment & Plan:   Beto Andrea is a 68 year old male who presents for a Medicare Assessment.     There are no diagnoses linked to this encounter.    1. Medicare annual wellness visit, subsequent      CPE PLAN:    LABS / TEST & ORDERS for today's visit :Blood test(s) ordered today for send out to [facility] lab:  Orders Placed This Encounter   Procedures    CBC With Differential With Platelet    Comp Metabolic Panel (14)    Lipid Panel    PSA, Total W Reflex To Free    TSH W Reflex To Free T4    Vitamin D    Urinalysis with Culture Reflex    POC Glycohemoglobin [71673]   Referrals: EKG 12-LEAD  XR DEXA BONE DENSITOMETRY (CPT=77080)  In-House; Urine dip.  Test/Procedures done today include: EKG.    IMMUNIZATIONS: none given today.    RECOMMENDATIONS given include: ANTICIPATORY GUIDANCE  topics covered today include: safety (i.e. seat belts, helmets, sunscreen, protective sports gear ), nutrition (i.e. healthy meals and snacks (i.e. avoid junk food and high-carbohydrate foods); athletic conditioning, fluids; low fat milk, limit to less than 20 oz. a day; dental care ), and Healthy habits& Social competence & Responsibilities:  Recommendations on physical activity; exercise daily or at least 3 times a week for 30-60 minutes doing cardiovascular exercise. Encourage to maintain the best physical and dental hygiene possible.      FOLLOW-UP: Schedule a follow-up visit in 12 months.     2. Type 2 diabetes mellitus with diabetic polyneuropathy, with long-term current use of insulin (HCC)    DIABETES A&P    LABORATORY & ORDERS: Blood test(s) ordered today ;   Orders Placed This Encounter   Procedures    CBC With Differential With Platelet    Comp Metabolic Panel (14)    Lipid Panel    PSA, Total W Reflex To Free    TSH W Reflex To Free T4    Vitamin D    Urinalysis with Culture Reflex    POC Glycohemoglobin [[id number]]     Additional orders include:   MEDICATIONS:    atorvastatin 40 MG Oral Tab, Take 1 tablet (40 mg total) by mouth nightly., Disp: 90 tablet, Rfl: 3    Dapagliflozin Pro-metFORMIN ER (XIGDUO XR) 5-1000 MG Oral Tablet 24 Hr, Take 1 tablet by mouth 2 (two) times daily., Disp: 180 tablet, Rfl: 1    Insulin Lispro Prot & Lispro (HUMALOG MIX 75/25 KWIKPEN) (75-25) 100 UNIT/ML SC SUPN, INJECT 48 UNITS UNDER THE SKIN EVERY MORNING AND 42 UNITS EVERY EVENING, Disp: 24 mL, Rfl: 3    losartan 25 MG Oral Tab, Take 1 tablet (25 mg total) by mouth daily., Disp: 90 tablet, Rfl: 3    metoprolol succinate ER 25 MG Oral Tablet 24 Hr, Take 1 tablet (25 mg total) by mouth daily., Disp: 90 tablet, Rfl: 1    TRULICITY 1.5 MG/0.5ML Subcutaneous Solution Pen-injector, Inject 1.5 mg into the skin once a week., Disp: 18 mL, Rfl: 1    pregabalin 75 MG Oral Cap, Take 1 capsule (75 mg total) by mouth 3 (three) times daily., Disp: 90 capsule, Rfl: 0    Accu-Chek Softclix Lancets Does not apply Misc, 1 Lancet by Finger stick route in the morning and 1 Lancet before bedtime., Disp: 200 each, Rfl: 3    Insulin Pen Needle (TECHLITE PEN NEEDLES) 32G X 6 MM Does not apply Misc, Use as directed 4 times daily, Disp: 400 each, Rfl: 3    tamsulosin 0.4 MG Oral Cap, TAKE 1  CAPSULE BY MOUTH EVERY DAY, Disp: 90 capsule, Rfl: 2    amitriptyline 50 MG Oral Tab, Take 1 tablet (50 mg total) by mouth nightly., Disp: 90 tablet, Rfl: 1    celecoxib (CELEBREX) 200 MG Oral Cap, Take 1 capsule (200 mg total) by mouth daily., Disp: 90 capsule, Rfl: 1    Glucose Blood (ACCU-CHEK DAXA PLUS) In Vitro Strip, TEST TWICE DAILY, Disp: 200 strip, Rfl: 3    tiZANidine 4 MG Oral Tab, Take 1 tablet (4 mg total) by mouth every 6 (six) hours as needed., Disp: 60 tablet, Rfl: 0    Alcohol Swabs (ALCOHOL PADS) 70 % Does not apply Pads, For use prior to checking sugar or giving insulin, Disp: , Rfl:     aspirin EC 81 MG Oral Tab EC, Take 1 tablet (81 mg total) by mouth daily., Disp: 100 tablet, Rfl: 2.  Requested Prescriptions     Signed Prescriptions Disp Refills    atorvastatin 40 MG Oral Tab 90 tablet 3     Sig: Take 1 tablet (40 mg total) by mouth nightly.    Dapagliflozin Pro-metFORMIN ER (XIGDUO XR) 5-1000 MG Oral Tablet 24 Hr 180 tablet 1     Sig: Take 1 tablet by mouth 2 (two) times daily.    Insulin Lispro Prot & Lispro (HUMALOG MIX 75/25 KWIKPEN) (75-25) 100 UNIT/ML SC SUPN 24 mL 3     Sig: INJECT 48 UNITS UNDER THE SKIN EVERY MORNING AND 42 UNITS EVERY EVENING    losartan 25 MG Oral Tab 90 tablet 3     Sig: Take 1 tablet (25 mg total) by mouth daily.    metoprolol succinate ER 25 MG Oral Tablet 24 Hr 90 tablet 1     Sig: Take 1 tablet (25 mg total) by mouth daily.    TRULICITY 1.5 MG/0.5ML Subcutaneous Solution Pen-injector 18 mL 1     Sig: Inject 1.5 mg into the skin once a week.    pregabalin 75 MG Oral Cap 90 capsule 0     Sig: Take 1 capsule (75 mg total) by mouth 3 (three) times daily.      REFERRALS:       Procedures        Comp Metabolic Panel (14)    Lipid Panel    PSA, Total W Reflex To Free    TSH W Reflex To Free T4            POC Glycohemoglobin [[id number]]   RECOMMENDATIONS: instructed in use of glucometer ( check fasting glucose multiple times a day), return for training in administering  insulin injections, adherence to an 1800 calorie ADA diet,  10 pound weight loss, a graduated exercise program, HgbA1C level checked quarterly, daily foot self-inspection, need for yearly flu shots, and avoid all sodas, juices, candy, chocolates, cakes, ice cream, etc.      FOLLOW-UP: Schedule a follow-up visit in 6 weeks.       COUNSELING: The patient was counseled concerning the relationship between diabetes control and macrovascular disease including cardiovascular, cerebrovascular and peripheral vascular disease. The patient was counseled concerning the relationship between diabetes control and retinopathy, nephropathy, and neuropathy. Advised as to the targets of pre-meal glucoses ( mg/dl) and post meal glucoses (<140-160 mg/dl) Home glucose testing discussed. The A1c target of <7% according to ADA and <6.5% according to AACE were discussed.          3. Essential hypertension  Stable  CPM  Follow up [facility]  Lab:   - CBC With Differential With Platelet; Future  - Comp Metabolic Panel (14); Future  - EKG 12 Lead; Future    4. Pure hypercholesterolemia  Stable  CPM  Follow up [facility]  Lab:   - Lipid Panel; Future    5. Severe obesity (BMI 35.0-39.9) with comorbidity (HCC)  Weight loss plan    6. Intermittent claudication (HCC)  Resolved    7. Primary central sleep apnea  CPM    8. Benign prostatic hyperplasia with nocturia  Stable  CPM  Follow up [facility]  Lab:   - PSA, Total W Reflex To Free; Future    9. Spondylolisthesis of lumbar region  Doing well  CPM    10. Colon cancer screening  S/p colonoscopy    11. Gastroesophageal reflux disease without esophagitis  12. Slow transit constipation  Stable  CPM  Follow up [facility]    13. Nocturia  CPM    14. Hypovitaminosis D  Lab:  Vitamin D; Future    15. Risk for falls  Moderate risk, precautions given    16. Osteopenia of lumbar spine  Referral:   - XR DEXA BONE DENSITOMETRY (CPT=77080); Future    17. Lumbar stenosis with neurogenic claudication  Dong better  Refills:   -  pregabalin 75 MG Oral Cap; Take 1 capsule (75 mg total) by mouth 3 (three) times daily.  Dispense: 90 capsule; Refill: 0    18. Polyneuropathy  Refills:   - pregabalin 75 MG Oral Cap; Take 1 capsule (75 mg total) by mouth 3 (three) times daily.  Dispense: 90 capsule; Refill: 0     The patient indicates understanding of these issues and agrees to the plan.  Further testing ordered.  Imaging studies ordered.  Lab work ordered.  Reinforced healthy diet, lifestyle, and exercise.      No follow-ups on file.     RADHA THORNTON MD, 3/4/2024     Supplementary Documentation:   General Health:  In the past six months, have you lost more than 10 pounds without trying?: 2 - No  Has your appetite been poor?: No  Type of Diet: Balanced  How does the patient maintain a good energy level?: Stretching  How would you describe your daily physical activity?: Light  How would you describe your current health state?: Good  How do you maintain positive mental well-being?: Social Interaction  On a scale of 0 to 10, with 0 being no pain and 10 being severe pain, what is your pain level?: 0 - (None)  In the past six months, have you experienced urine leakage?: 0-No  At any time do you feel concerned for the safety/well-being of yourself and/or your children, in your home or elsewhere?: No  Have you had any immunizations at another office such as Influenza, Hepatitis B, Tetanus, or Pneumococcal?: No        Beto Andrea's SCREENING SCHEDULE   Tests on this list are recommended by your physician but may not be covered, or covered at this frequency, by your insurer.   Please check with your insurance carrier before scheduling to verify coverage.   PREVENTATIVE SERVICES FREQUENCY &  COVERAGE DETAILS LAST COMPLETION DATE   Diabetes Screening    Fasting Blood Sugar / Glucose    One screening every 12 months if never tested or if previously tested but not diagnosed with pre-diabetes   One screening every 6 months if diagnosed with  pre-diabetes Lab Results   Component Value Date    GLU 87 02/15/2023        Cardiovascular Disease Screening    Lipid Panel  Cholesterol  Lipoprotein (HDL)  Triglycerides Covered every 5 years for all Medicare beneficiaries without apparent signs or symptoms of cardiovascular disease Lab Results   Component Value Date    CHOLEST 138 02/15/2023    HDL 42 02/15/2023    LDL 70 02/15/2023    TRIG 149 02/15/2023         Electrocardiogram (EKG)   Covered if needed at Welcome to Medicare, and non-screening if indicated for medical reasons 02/15/2023      Ultrasound Screening for Abdominal Aortic Aneurysm (AAA) Covered once in a lifetime for one of the following risk factors    Men who are 65-75 years old and have ever smoked    Anyone with a family history -     Colorectal Cancer Screening  Covered for ages 50-85; only need ONE of the following:    Colonoscopy   Covered every 10 years    Covered every 2 years if patient is at high risk or previous colonoscopy was abnormal 09/08/2020    Health Maintenance   Topic Date Due    Colorectal Cancer Screening  09/08/2030       Flexible Sigmoidoscopy   Covered every 4 years -    Fecal Occult Blood Test Covered annually -   Prostate Cancer Screening    Prostate-Specific Antigen (PSA) Annually Lab Results   Component Value Date    PSA 0.55 02/15/2023     Health Maintenance   Topic Date Due    PSA  02/15/2025      Immunizations    Influenza Covered once per flu season  Please get every year -  Influenza Vaccine(1) due on 10/01/2023    Pneumococcal Each vaccine ([id number] & [id number]) covered once after 65 Prevnar 13: -    [id number]: 05/15/2020     Pneumococcal Vaccination(2 of 2 - PCV) due on 05/15/2021    Hepatitis B One screening covered for patients with certain risk factors   11/07/2015  No recommendations at this time    Tetanus Toxoid Not covered by Medicare Part B unless medically necessary (cut with metal); may be covered with your pharmacy prescription benefits -     Tetanus, Diptheria and Pertusis TD and TDaP Not covered by Medicare Part B -  No recommendations at this time    Zoster Not covered by Medicare Part B; may be covered with your pharmacy  prescription benefits 11/08/2017  Zoster Vaccines(2 of 3) due on 01/03/2018     Diabetes      Hemoglobin A1C Annually; if last result is elevated, may be asked to retest more frequently.    Medicare covers every 3 months Lab Results   Component Value Date     (H) 02/15/2023    A1C 7.6 (A) 05/22/2023       Hemoglobin A1C Test due on 11/22/2023    Creat/alb ratio Annually Lab Results   Component Value Date    MICROALBCREA  05/22/2023      Comment:      Unable to calculate due to Urine Microalbumin <0.5 mg/dL         LDL Annually Lab Results   Component Value Date    LDL 70 02/15/2023       Dilated Eye Exam Annually Last Diabetic Eye Exam:  Last Dilated Eye Exam: 10/06/23  Eye Exam shows Diabetic Retinopathy?: No       Annual Monitoring of Persistent Medications (ACE/ARB, digoxin diuretics, anticonvulsants)    Potassium Annually Lab Results   Component Value Date    K 3.9 02/15/2023         Creatinine   Annually Lab Results   Component Value Date    CREATSERUM 0.86 02/15/2023         BUN Annually Lab Results   Component Value Date    BUN 11 02/15/2023       Drug Serum Conc Annually No results found for: \"DIGOXIN\", \"DIG\", \"VALP\"           Chronic Obstructive Pulmonary Disease (COPD)    Spirometry Annually Spirometry date:

## 2024-03-04 NOTE — TELEPHONE ENCOUNTER
LVMTCB. Last refill was 10/12/23 and patient is supposed to be taking TID.     Refill Request    Medication request: pregabalin 75 MG Oral Cap    Take 1 capsule (75 mg total) by mouth 3 (three) times daily.     LOV:10/12/2023 Rojelio Mercedes DO   Due back to clinic per last office note:  3-6 months  NOV: 3/19/2024 Rojelio Mercedes DO      [place]/Last refill: 10/13/23 #90    Urine drug screen (if applicable): n/a  Pain contract: n/a    LOV plan (if weaning or changing medications):  He elected to increase the pregabalin; he will take 75mg TID.

## 2024-03-06 ENCOUNTER — LAB ENCOUNTER (OUTPATIENT)
Dept: LAB | Facility: HOSPITAL | Age: 69
End: 2024-03-06
Attending: FAMILY MEDICINE
Payer: MEDICARE

## 2024-03-06 ENCOUNTER — HOSPITAL ENCOUNTER (OUTPATIENT)
Dept: BONE DENSITY | Facility: HOSPITAL | Age: 69
Discharge: HOME | End: 2024-03-06
Attending: FAMILY MEDICINE
Payer: MEDICARE

## 2024-03-06 DIAGNOSIS — R35.1 BENIGN PROSTATIC HYPERPLASIA WITH NOCTURIA: ICD-10-CM

## 2024-03-06 DIAGNOSIS — E11.42 TYPE 2 DIABETES MELLITUS WITH DIABETIC POLYNEUROPATHY, WITH LONG-TERM CURRENT USE OF INSULIN (HCC): ICD-10-CM

## 2024-03-06 DIAGNOSIS — N40.1 BENIGN PROSTATIC HYPERPLASIA WITH NOCTURIA: ICD-10-CM

## 2024-03-06 DIAGNOSIS — Z79.4 TYPE 2 DIABETES MELLITUS WITH DIABETIC POLYNEUROPATHY, WITH LONG-TERM CURRENT USE OF INSULIN (HCC): ICD-10-CM

## 2024-03-06 DIAGNOSIS — M85.88 OSTEOPENIA OF LUMBAR SPINE: ICD-10-CM

## 2024-03-06 DIAGNOSIS — I10 ESSENTIAL HYPERTENSION: ICD-10-CM

## 2024-03-06 DIAGNOSIS — E78.00 PURE HYPERCHOLESTEROLEMIA: ICD-10-CM

## 2024-03-06 DIAGNOSIS — E55.9 HYPOVITAMINOSIS D: ICD-10-CM

## 2024-03-06 LAB
ALBUMIN SERPL-MCNC: 4.6 G/DL (ref 3.2–4.8)
ALBUMIN/GLOB SERPL: 1.6 {RATIO} (ref 1–2)
ALP LIVER SERPL-CCNC: 83 U/L
ALT SERPL-CCNC: 19 U/L
ANION GAP SERPL CALC-SCNC: 5 MMOL/L (ref 0–18)
AST SERPL-CCNC: 15 U/L (ref ?–34)
BASOPHILS # BLD AUTO: 0.03 X10(3) UL (ref 0–0.2)
BASOPHILS NFR BLD AUTO: 0.4 %
BILIRUB SERPL-MCNC: 0.8 MG/DL (ref 0.2–1.1)
BILIRUB UR QL: NEGATIVE
BUN BLD-MCNC: 10 MG/DL (ref 9–23)
BUN/CREAT SERPL: 12 (ref 10–20)
CALCIUM BLD-MCNC: 10 MG/DL (ref 8.7–10.4)
CHLORIDE SERPL-SCNC: 109 MMOL/L (ref 98–112)
CHOLEST SERPL-MCNC: 219 MG/DL (ref ?–200)
CLARITY UR: CLEAR
CO2 SERPL-SCNC: 24 MMOL/L (ref 21–32)
CREAT BLD-MCNC: 0.83 MG/DL
DEPRECATED RDW RBC AUTO: 44.5 FL (ref 35.1–46.3)
EGFRCR SERPLBLD CKD-EPI 2021: 95 ML/MIN/1.73M2 (ref 60–?)
EOSINOPHIL # BLD AUTO: 0.21 X10(3) UL (ref 0–0.7)
EOSINOPHIL NFR BLD AUTO: 2.8 %
ERYTHROCYTE [DISTWIDTH] IN BLOOD BY AUTOMATED COUNT: 13.6 % (ref 11–15)
FASTING PATIENT LIPID ANSWER: YES
FASTING STATUS PATIENT QL REPORTED: YES
GLOBULIN PLAS-MCNC: 2.9 G/DL (ref 2.8–4.4)
GLUCOSE BLD-MCNC: 143 MG/DL (ref 70–99)
GLUCOSE UR-MCNC: >1000 MG/DL
HCT VFR BLD AUTO: 43.5 %
HDLC SERPL-MCNC: 43 MG/DL (ref 40–59)
HGB BLD-MCNC: 14.9 G/DL
HGB UR QL STRIP.AUTO: NEGATIVE
IMM GRANULOCYTES # BLD AUTO: 0.03 X10(3) UL (ref 0–1)
IMM GRANULOCYTES NFR BLD: 0.4 %
KETONES UR-MCNC: NEGATIVE MG/DL
LDLC SERPL CALC-MCNC: 137 MG/DL (ref ?–100)
LEUKOCYTE ESTERASE UR QL STRIP.AUTO: NEGATIVE
LYMPHOCYTES # BLD AUTO: 2.8 X10(3) UL (ref 1–4)
LYMPHOCYTES NFR BLD AUTO: 37.7 %
MCH RBC QN AUTO: 30.6 PG (ref 26–34)
MCHC RBC AUTO-ENTMCNC: 34.3 G/DL (ref 31–37)
MCV RBC AUTO: 89.3 FL
MONOCYTES # BLD AUTO: 0.42 X10(3) UL (ref 0.1–1)
MONOCYTES NFR BLD AUTO: 5.7 %
NEUTROPHILS # BLD AUTO: 3.93 X10 (3) UL (ref 1.5–7.7)
NEUTROPHILS # BLD AUTO: 3.93 X10(3) UL (ref 1.5–7.7)
NEUTROPHILS NFR BLD AUTO: 53 %
NITRITE UR QL STRIP.AUTO: NEGATIVE
NONHDLC SERPL-MCNC: 176 MG/DL (ref ?–130)
OSMOLALITY SERPL CALC.SUM OF ELEC: 288 MOSM/KG (ref 275–295)
PH UR: 6.5 [PH] (ref 5–8)
PLATELET # BLD AUTO: 178 10(3)UL (ref 150–450)
POTASSIUM SERPL-SCNC: 3.9 MMOL/L (ref 3.5–5.1)
PROT SERPL-MCNC: 7.5 G/DL (ref 5.7–8.2)
PROT UR-MCNC: NEGATIVE MG/DL
PSA SERPL-MCNC: 0.7 NG/ML (ref ?–4)
RBC # BLD AUTO: 4.87 X10(6)UL
SODIUM SERPL-SCNC: 138 MMOL/L (ref 136–145)
SP GR UR STRIP: 1.03 (ref 1–1.03)
TRIGL SERPL-MCNC: 217 MG/DL (ref 30–149)
TSI SER-ACNC: 3 MIU/ML (ref 0.55–4.78)
UROBILINOGEN UR STRIP-ACNC: NORMAL
VIT D+METAB SERPL-MCNC: 17.9 NG/ML (ref 30–100)
VLDLC SERPL CALC-MCNC: 40 MG/DL (ref 0–30)
WBC # BLD AUTO: 7.4 X10(3) UL (ref 4–11)

## 2024-03-06 PROCEDURE — 80053 COMPREHEN METABOLIC PANEL: CPT

## 2024-03-06 PROCEDURE — 84153 ASSAY OF PSA TOTAL: CPT

## 2024-03-06 PROCEDURE — 82306 VITAMIN D 25 HYDROXY: CPT

## 2024-03-06 PROCEDURE — 85025 COMPLETE CBC W/AUTO DIFF WBC: CPT

## 2024-03-06 PROCEDURE — 93010 ELECTROCARDIOGRAM REPORT: CPT | Performed by: STUDENT IN AN ORGANIZED HEALTH CARE EDUCATION/TRAINING PROGRAM

## 2024-03-06 PROCEDURE — 36415 COLL VENOUS BLD VENIPUNCTURE: CPT

## 2024-03-06 PROCEDURE — 84443 ASSAY THYROID STIM HORMONE: CPT

## 2024-03-06 PROCEDURE — 77080 DXA BONE DENSITY AXIAL: CPT | Performed by: FAMILY MEDICINE

## 2024-03-06 PROCEDURE — 93005 ELECTROCARDIOGRAM TRACING: CPT

## 2024-03-06 PROCEDURE — 80061 LIPID PANEL: CPT

## 2024-03-06 PROCEDURE — 81003 URINALYSIS AUTO W/O SCOPE: CPT

## 2024-03-06 RX ORDER — ERGOCALCIFEROL 1.25 MG/1
50000 CAPSULE ORAL WEEKLY
Qty: 12 CAPSULE | Refills: 3 | Status: SHIPPED | OUTPATIENT
Start: 2024-03-06 | End: 2025-03-06

## 2024-03-06 NOTE — PROGRESS NOTES
Please notify patient that his/her blood test showed low levels of vitamin D. A prescription was sent to his pharmacy to take one capsule or tablet, once a week. This Rx is good for one year. We'll recheck levels in one year.    Normal CBC, CMP, Lipids, TSH

## 2024-03-07 LAB
ATRIAL RATE: 63 BPM
P AXIS: -9 DEGREES
P-R INTERVAL: 172 MS
Q-T INTERVAL: 408 MS
QRS DURATION: 92 MS
QTC CALCULATION (BEZET): 417 MS
R AXIS: -38 DEGREES
T AXIS: 30 DEGREES
VENTRICULAR RATE: 63 BPM

## 2024-03-08 ENCOUNTER — TELEPHONE (OUTPATIENT)
Dept: FAMILY MEDICINE CLINIC | Facility: CLINIC | Age: 69
End: 2024-03-08

## 2024-03-08 ENCOUNTER — LAB ENCOUNTER (OUTPATIENT)
Dept: LAB | Age: 69
End: 2024-03-08
Attending: UROLOGY
Payer: MEDICARE

## 2024-03-08 DIAGNOSIS — N13.8 BENIGN LOCALIZED HYPERPLASIA OF PROSTATE WITH URINARY OBSTRUCTION: Primary | ICD-10-CM

## 2024-03-08 DIAGNOSIS — N40.1 BENIGN LOCALIZED HYPERPLASIA OF PROSTATE WITH URINARY OBSTRUCTION: Primary | ICD-10-CM

## 2024-03-08 LAB — PSA SERPL-MCNC: 0.61 NG/ML (ref ?–4)

## 2024-03-08 PROCEDURE — 84153 ASSAY OF PSA TOTAL: CPT

## 2024-03-08 PROCEDURE — 36415 COLL VENOUS BLD VENIPUNCTURE: CPT

## 2024-03-19 ENCOUNTER — OFFICE VISIT (OUTPATIENT)
Dept: PHYSICAL MEDICINE AND REHAB | Facility: CLINIC | Age: 69
End: 2024-03-19
Payer: COMMERCIAL

## 2024-03-19 VITALS — BODY MASS INDEX: 40 KG/M2 | WEIGHT: 245 LBS | HEART RATE: 71 BPM | OXYGEN SATURATION: 95 %

## 2024-03-19 DIAGNOSIS — M48.062 LUMBAR STENOSIS WITH NEUROGENIC CLAUDICATION: Primary | ICD-10-CM

## 2024-03-19 DIAGNOSIS — M65.352 TRIGGER LITTLE FINGER OF LEFT HAND: ICD-10-CM

## 2024-03-19 DIAGNOSIS — G62.9 POLYNEUROPATHY: ICD-10-CM

## 2024-03-19 PROCEDURE — 1160F RVW MEDS BY RX/DR IN RCRD: CPT | Performed by: PHYSICAL MEDICINE & REHABILITATION

## 2024-03-19 PROCEDURE — 99214 OFFICE O/P EST MOD 30 MIN: CPT | Performed by: PHYSICAL MEDICINE & REHABILITATION

## 2024-03-19 PROCEDURE — 1125F AMNT PAIN NOTED PAIN PRSNT: CPT | Performed by: PHYSICAL MEDICINE & REHABILITATION

## 2024-03-19 PROCEDURE — 1159F MED LIST DOCD IN RCRD: CPT | Performed by: PHYSICAL MEDICINE & REHABILITATION

## 2024-03-19 RX ORDER — AMITRIPTYLINE HYDROCHLORIDE 50 MG/1
50 TABLET, FILM COATED ORAL NIGHTLY
Qty: 90 TABLET | Refills: 2 | Status: SHIPPED | OUTPATIENT
Start: 2024-03-19

## 2024-03-19 RX ORDER — PREGABALIN 150 MG/1
150 CAPSULE ORAL EVERY EVENING
Qty: 90 CAPSULE | Refills: 0 | Status: SHIPPED | OUTPATIENT
Start: 2024-03-19

## 2024-03-19 NOTE — PATIENT INSTRUCTIONS
Recommend you apply voltaren gel over the palm of the hand along the middle, ring and little fingers only twice a day for 2 weeks. If you forget where to put the gel put it along the crease closest to the fingers.     If no better consider trigger finger steroid injection once.

## 2024-03-21 NOTE — PROGRESS NOTES
Progress note    C/C:   Chief Complaint   Patient presents with    Follow - Up     10/12/23 LOV. States his pain is the same. T/n in b/l feet. Pain 7/10. Taking pregabalin 1 daily and 2 pm. Amitriptyline nightly.       HPI: 68-year-old male with history of diabetes presents for follow-up.  Since the last time I saw him he has had some difficulty making a closed fist with both hands, left worse than right.  He has also developed appreciable triggering of the left little finger that has been present for about 2 to 4 weeks.  He has no previous history of trigger finger.  The ring and middle fingers of both hands feel as though they are going to trigger, but he has not noticed any discrete triggering.    He also continues to have back pain, right worse than left, with numbness in both legs.  Back pain worsens with prolonged walking. Amitriptyline and pregabalin help to some degree with the back and leg symptoms.    He also had a recent DEXA scan that reports osteopenia.    Pertinent allergies: No Known Allergies     Physical exam:  Pulse 71   Wt 245 lb (111.1 kg)   SpO2 95%   BMI 39.54 kg/m²      Hypertrophy of the DIP, PIP, MCP joints of both hands  Appreciable reproducible triggering of the left middle finger, along with pain on palpation of the area of the A1 pulley.  He has no appreciable swelling or nodules over the A1 pulleys of the other fingers.  Sensation intact light touch median, radial, ulnar distributions of the hand bilaterally    Lumbar spine exam:    No skin rash lumbar/upper sacral region  No pain with lumbar flexion. mild pain with lumbar extension.  5/5 LE strength b/l in HF, KE, ADF, ankle eversion, ankle inversion bilaterally  SILT b/l LE  2/4 quad, gastrocs reflexes b/l  Facet loading + b/l  SLR negative b/l    Imaging: No new imaging to review    Assessment and plan  Acquired left fifth finger trigger finger  Diabetes  Lumbar stenosis with neurogenic claudication  Polyneuropathy, attributed to  diabetes  Osteopenia    He will apply Voltaren gel in the area of the A1 pulleys of the 3-5 fingers of both hands twice daily for the next 2 weeks and see if that helps.  If the triggering of the left little finger continues to consider trigger finger steroid injection under ultrasound guidance, though I did instruct him that if we do a trigger finger injection and he has recurrence it is probably best for him to see a hand surgeon for release rather than to repeat steroid injections given the history of newly diagnosed osteopenia.  Similarly we will continue with amitriptyline and pregabalin as prescribed, and epidural steroid injection should be done judiciously if he becomes significantly limited in his ability to tolerate standing or walking.    Rojelio Mercedes DO  Physical Medicine and Rehabilitation  [facility]

## 2024-03-26 ENCOUNTER — OFFICE VISIT (OUTPATIENT)
Dept: FAMILY MEDICINE CLINIC | Facility: CLINIC | Age: 69
End: 2024-03-26

## 2024-03-26 VITALS
SYSTOLIC BLOOD PRESSURE: 120 MMHG | HEIGHT: 66 IN | HEART RATE: 71 BPM | WEIGHT: 254.38 LBS | BODY MASS INDEX: 40.88 KG/M2 | DIASTOLIC BLOOD PRESSURE: 70 MMHG

## 2024-03-26 DIAGNOSIS — E11.65 UNCONTROLLED TYPE 2 DIABETES MELLITUS WITH HYPERGLYCEMIA (HCC): Primary | ICD-10-CM

## 2024-03-26 PROCEDURE — 3008F BODY MASS INDEX DOCD: CPT | Performed by: FAMILY MEDICINE

## 2024-03-26 PROCEDURE — 1126F AMNT PAIN NOTED NONE PRSNT: CPT | Performed by: FAMILY MEDICINE

## 2024-03-26 PROCEDURE — 3074F SYST BP LT 130 MM HG: CPT | Performed by: FAMILY MEDICINE

## 2024-03-26 PROCEDURE — 1159F MED LIST DOCD IN RCRD: CPT | Performed by: FAMILY MEDICINE

## 2024-03-26 PROCEDURE — 3078F DIAST BP <80 MM HG: CPT | Performed by: FAMILY MEDICINE

## 2024-03-26 PROCEDURE — 99214 OFFICE O/P EST MOD 30 MIN: CPT | Performed by: FAMILY MEDICINE

## 2024-03-26 RX ORDER — LOSARTAN POTASSIUM 25 MG/1
25 TABLET ORAL DAILY
Qty: 90 TABLET | Refills: 3 | Status: SHIPPED | OUTPATIENT
Start: 2024-03-26

## 2024-03-26 RX ORDER — ATORVASTATIN CALCIUM 40 MG/1
40 TABLET, FILM COATED ORAL NIGHTLY
Qty: 90 TABLET | Refills: 3 | Status: SHIPPED | OUTPATIENT
Start: 2024-03-26

## 2024-03-26 RX ORDER — DULAGLUTIDE 1.5 MG/.5ML
1.5 INJECTION, SOLUTION SUBCUTANEOUS WEEKLY
Qty: 18 ML | Refills: 1 | Status: SHIPPED | OUTPATIENT
Start: 2024-03-26 | End: 2024-03-26

## 2024-03-26 RX ORDER — INSULIN LISPRO 100 [IU]/ML
INJECTION, SUSPENSION SUBCUTANEOUS
Qty: 24 ML | Refills: 3 | Status: SHIPPED | OUTPATIENT
Start: 2024-03-26

## 2024-03-26 RX ORDER — DAPAGLIFLOZIN AND METFORMIN HYDROCHLORIDE 5; 1000 MG/1; MG/1
1 TABLET, FILM COATED, EXTENDED RELEASE ORAL 2 TIMES DAILY
Qty: 180 TABLET | Refills: 1 | Status: SHIPPED | OUTPATIENT
Start: 2024-03-26

## 2024-03-26 RX ORDER — METOPROLOL SUCCINATE 25 MG/1
25 TABLET, EXTENDED RELEASE ORAL DAILY
Qty: 90 TABLET | Refills: 1 | Status: SHIPPED | OUTPATIENT
Start: 2024-03-26

## 2024-03-26 RX ORDER — INSULIN LISPRO 100 [IU]/ML
INJECTION, SUSPENSION SUBCUTANEOUS
Qty: 24 ML | Refills: 3 | Status: SHIPPED | OUTPATIENT
Start: 2024-03-26 | End: 2024-03-26

## 2024-03-26 RX ORDER — DULAGLUTIDE 1.5 MG/.5ML
1.5 INJECTION, SOLUTION SUBCUTANEOUS WEEKLY
Qty: 18 ML | Refills: 1 | Status: SHIPPED | OUTPATIENT
Start: 2024-03-26

## 2024-03-26 NOTE — PROGRESS NOTES
3/26/2024  1:49 PM    Beto Andrea is a 68 year old male.    Chief complaint(s):   Chief Complaint   Patient presents with    Test Results     Discuss results and refills on medication      HPI:     Beto Andrea primary complaint is regarding uncontrol diabetes.     Patient Beto Andrea is a 68 year old male is here to be evaluated for type 2 diabetes.  Specifically, male has type 2, insulin none requiring diabetes. Compliance with treatment has been POOR.  Patient's diabetes was first diagnosed >15 years ago.  Patient follows a 2000 calorie ADA diet.  Patient report experiencing the following diabetes related symptoms; Positive for polyuria, Positive for polydipsia, Positive for blurred vision.  Depression symptoms include none.  Tobacco screen: none  smoker.  Current meds include :   oral hypoglycemic include: Xigduo Xr 5-1000mg, Trulicity 1.5 weekly  insulin/injectable : Trulciity 1.5 weekly, Humalog 75/25 increased to 50u Q am, 45u Q pm  Hypoglycemia severity is not applicable.he reports home blood glucose readings have been 128 (#s) and believes  having fair glucose control.  Most recent lab results include glycohemoglobin 9.0%, microalbuminuria have been ?.  In regard to preventative care, his last ophthalmology exam was in >2 months ago.  Opthalmic evaluation have shown no pathology.  Concurrent relative health problems include HTN, hyperlipidemia.      HISTORY:  Past Medical History:   Diagnosis Date    Cataracts, bilateral     Diabetes (HCC)     Essential hypertension     High blood pressure     Hyperlipidemia       Past Surgical History:   Procedure Laterality Date    COLONOSCOPY N/A 9/8/2020    Procedure: COLONOSCOPY;  Surgeon: Zbigniew Hooker MD;  Location: [facility] ENDOSCOPY    LAMINECTOMY  1991    LIGMT REVISION,KNEE,INTRA-ARTIC Left       Family History   Problem Relation Age of Onset    Diabetes Mother     Diabetes Sister       Social History:   Social History     Socioeconomic  History    Marital status:    Tobacco Use    Smoking status: Former     Years: 20     Types: Cigarettes     Quit date: 6/10/1993     Years since quittin.8    Smokeless tobacco: Never   Vaping Use    Vaping Use: Never used   Substance and Sexual Activity    Alcohol use: Yes    Drug use: No   Other Topics Concern    Caffeine Concern Yes     Comment: daily 2 cups    Exercise Yes     Comment: walking 20 min daily   Social History Narrative    The patient does not use an assistive device..      The patient does live in a home with stairs.        Immunizations:   Immunization History   Administered Date(s) Administered    Covid-19 Vaccine Pfizer 30 mcg/0.3 ml 2021    Covid-19 Vaccine Pfizer Bivalent 30mcg/0.3mL 2022    FLU VAC High Dose 65 YRS & Older PRSV Free (24413) 2021    FLULAVAL 6 months & older 0.5 ml Prefilled syringe (28154) 2020    FLUZONE 6 months and older PFS 0.5 ml (43651) 2017, 2018    Fluvirin, 3 Years & >, Im 2013, 10/21/2014    HEP A/HEP B Combined 2016    HEP B 2015, 2015    Hep B, Unspecified Formulation 2015, 2015    Influenza 2015, 2016    Pneumovax 23 05/15/2020    TDAP 2015, 2019    Zoster Vaccine Live (Zostavax) 2014, 2017       Medications (Active prior to today's visit):  Current Outpatient Medications   Medication Sig Dispense Refill    atorvastatin 40 MG Oral Tab Take 1 tablet (40 mg total) by mouth nightly. 90 tablet 3    Dapagliflozin Pro-metFORMIN ER (XIGDUO XR) 5-1000 MG Oral Tablet 24 Hr Take 1 tablet by mouth 2 (two) times daily. 180 tablet 1    losartan 25 MG Oral Tab Take 1 tablet (25 mg total) by mouth daily. 90 tablet 3    metoprolol succinate ER 25 MG Oral Tablet 24 Hr Take 1 tablet (25 mg total) by mouth daily. 90 tablet 1    Insulin Lispro Prot & Lispro (HUMALOG MIX 75/25 KWIKPEN) (75-25) 100 UNIT/ML SC SUPN INJECT 50 UNITS UNDER THE SKIN EVERY MORNING AND 45  UNITS EVERY EVENING 24 mL 3    Insulin Lispro Prot & Lispro (HUMALOG MIX 75/25 KWIKPEN) (75-25) 100 UNIT/ML SC SUPN INJECT 48 UNITS UNDER THE SKIN EVERY MORNING AND 42 UNITS EVERY EVENING 24 mL 3    TRULICITY 1.5 MG/0.5ML Subcutaneous Solution Pen-injector Inject 1.5 mg into the skin once a week. 18 mL 1    pregabalin 150 MG Oral Cap Take 1 capsule (150 mg total) by mouth every evening. 90 capsule 0    amitriptyline 50 MG Oral Tab Take 1 tablet (50 mg total) by mouth nightly. 90 tablet 2    ergocalciferol 1.25 MG (60669 UT) Oral Cap Take 1 capsule (50,000 Units total) by mouth once a week. 12 capsule 3    Accu-Chek Softclix Lancets Does not apply Misc 1 Lancet by Finger stick route in the morning and 1 Lancet before bedtime. 200 each 3    Insulin Pen Needle (TECHLITE PEN NEEDLES) 32G X 6 MM Does not apply Misc Use as directed 4 times daily 400 each 3    tamsulosin 0.4 MG Oral Cap TAKE 1 CAPSULE BY MOUTH EVERY DAY 90 capsule 2    Glucose Blood (ACCU-CHEK DAXA PLUS) In Vitro Strip TEST TWICE DAILY 200 strip 3    tiZANidine 4 MG Oral Tab Take 1 tablet (4 mg total) by mouth every 6 (six) hours as needed. 60 tablet 0    Alcohol Swabs (ALCOHOL PADS) 70 % Does not apply Pads For use prior to checking sugar or giving insulin      aspirin EC 81 MG Oral Tab EC Take 1 tablet (81 mg total) by mouth daily. 100 tablet 2    celecoxib (CELEBREX) 200 MG Oral Cap Take 1 capsule (200 mg total) by mouth daily. 90 capsule 1       Allergies:  No Known Allergies      ROS:   Review of Systems   Constitutional:  Negative for appetite change, fatigue and fever.   Respiratory:  Negative for shortness of breath.    Cardiovascular:  Negative for chest pain.   Gastrointestinal:  Negative for abdominal pain.   Musculoskeletal:  Negative for myalgias.   Skin:  Negative for rash.   Neurological:  Negative for dizziness, weakness and headaches.       PHYSICAL EXAM:   VS: /70   Pulse 71   Ht 5' 6\" (1.676 m)   Wt 254 lb 6.4 oz (115.4 kg)    BMI 41.06 kg/m²     Physical Exam  Vitals reviewed.   Constitutional:       Appearance: Normal appearance. He is well-developed.   HENT:      Head: Normocephalic.   Eyes:      General: No scleral icterus.     Conjunctiva/sclera: Conjunctivae normal.   Cardiovascular:      Rate and Rhythm: Normal rate.   Pulmonary:      Effort: Pulmonary effort is normal.   Musculoskeletal:      Cervical back: Neck supple.   Skin:     Findings: No rash.   Psychiatric:         Mood and Affect: Mood normal.         LABORATORY RESULTS:         EKG / Spirometry : -     Radiology:   ASSESSMENT/PLAN:   Assessment   Encounter Diagnosis   Name Primary?    Uncontrolled type 2 diabetes mellitus with hyperglycemia (HCC) Yes       DIABETES A&P    LABORATORY & ORDERS: Blood test(s) ordered today ; No orders of the defined types were placed in this encounter.    Additional orders include:   MEDICATIONS:    atorvastatin 40 MG Oral Tab, Take 1 tablet (40 mg total) by mouth nightly., Disp: 90 tablet, Rfl: 3    Dapagliflozin Pro-metFORMIN ER (XIGDUO XR) 5-1000 MG Oral Tablet 24 Hr, Take 1 tablet by mouth 2 (two) times daily., Disp: 180 tablet, Rfl: 1    losartan 25 MG Oral Tab, Take 1 tablet (25 mg total) by mouth daily., Disp: 90 tablet, Rfl: 3    metoprolol succinate ER 25 MG Oral Tablet 24 Hr, Take 1 tablet (25 mg total) by mouth daily., Disp: 90 tablet, Rfl: 1    Insulin Lispro Prot & Lispro (HUMALOG MIX 75/25 KWIKPEN) (75-25) 100 UNIT/ML SC SUPN, INJECT 50 UNITS UNDER THE SKIN EVERY MORNING AND 45 UNITS EVERY EVENING, Disp: 24 mL, Rfl: 3    Insulin Lispro Prot & Lispro (HUMALOG MIX 75/25 KWIKPEN) (75-25) 100 UNIT/ML SC SUPN, INJECT 48 UNITS UNDER THE SKIN EVERY MORNING AND 42 UNITS EVERY EVENING, Disp: 24 mL, Rfl: 3    TRULICITY 1.5 MG/0.5ML Subcutaneous Solution Pen-injector, Inject 1.5 mg into the skin once a week., Disp: 18 mL, Rfl: 1    pregabalin 150 MG Oral Cap, Take 1 capsule (150 mg total) by mouth every evening., Disp: 90 capsule, Rfl:  0    amitriptyline 50 MG Oral Tab, Take 1 tablet (50 mg total) by mouth nightly., Disp: 90 tablet, Rfl: 2    ergocalciferol 1.25 MG (75234 UT) Oral Cap, Take 1 capsule (50,000 Units total) by mouth once a week., Disp: 12 capsule, Rfl: 3    Accu-Chek Softclix Lancets Does not apply Misc, 1 Lancet by Finger stick route in the morning and 1 Lancet before bedtime., Disp: 200 each, Rfl: 3    Insulin Pen Needle (TECHLITE PEN NEEDLES) 32G X 6 MM Does not apply Misc, Use as directed 4 times daily, Disp: 400 each, Rfl: 3    tamsulosin 0.4 MG Oral Cap, TAKE 1 CAPSULE BY MOUTH EVERY DAY, Disp: 90 capsule, Rfl: 2    Glucose Blood (ACCU-CHEK DAXA PLUS) In Vitro Strip, TEST TWICE DAILY, Disp: 200 strip, Rfl: 3    tiZANidine 4 MG Oral Tab, Take 1 tablet (4 mg total) by mouth every 6 (six) hours as needed., Disp: 60 tablet, Rfl: 0    Alcohol Swabs (ALCOHOL PADS) 70 % Does not apply Pads, For use prior to checking sugar or giving insulin, Disp: , Rfl:     aspirin EC 81 MG Oral Tab EC, Take 1 tablet (81 mg total) by mouth daily., Disp: 100 tablet, Rfl: 2    celecoxib (CELEBREX) 200 MG Oral Cap, Take 1 capsule (200 mg total) by mouth daily., Disp: 90 capsule, Rfl: 1.  Requested Prescriptions     Signed Prescriptions Disp Refills    atorvastatin 40 MG Oral Tab 90 tablet 3     Sig: Take 1 tablet (40 mg total) by mouth nightly.    Dapagliflozin Pro-metFORMIN ER (XIGDUO XR) 5-1000 MG Oral Tablet 24 Hr 180 tablet 1     Sig: Take 1 tablet by mouth 2 (two) times daily.    losartan 25 MG Oral Tab 90 tablet 3     Sig: Take 1 tablet (25 mg total) by mouth daily.    metoprolol succinate ER 25 MG Oral Tablet 24 Hr 90 tablet 1     Sig: Take 1 tablet (25 mg total) by mouth daily.    Insulin Lispro Prot & Lispro (HUMALOG MIX 75/25 KWIKPEN) (75-25) 100 UNIT/ML SC SUPN 24 mL 3     Sig: INJECT 50 UNITS UNDER THE SKIN EVERY MORNING AND 45 UNITS EVERY EVENING    Insulin Lispro Prot & Lispro (HUMALOG MIX 75/25 KWIKPEN) (75-25) 100 UNIT/ML SC SUPN 24 mL 3      Sig: INJECT 48 UNITS UNDER THE SKIN EVERY MORNING AND 42 UNITS EVERY EVENING    TRULICITY 1.5 MG/0.5ML Subcutaneous Solution Pen-injector 18 mL 1     Sig: Inject 1.5 mg into the skin once a week.      RECOMMENDATIONS: instructed in use of glucometer ( check fasting glucose multiple times a day), return for training in administering insulin injections, adherence to an 1800 calorie ADA diet,  10 pound weight loss, a graduated exercise program, HgbA1C level checked quarterly, daily foot self-inspection, need for yearly flu shots, and avoid all sodas, juices, candy, chocolates, cakes, ice cream, etc.      FOLLOW-UP: Schedule a follow-up visit in 3 months.       COUNSELING: The patient was counseled concerning the relationship between diabetes control and macrovascular disease including cardiovascular, cerebrovascular and peripheral vascular disease. The patient was counseled concerning the relationship between diabetes control and retinopathy, nephropathy, and neuropathy. Advised as to the targets of pre-meal glucoses ( mg/dl) and post meal glucoses (<140-160 mg/dl) Home glucose testing discussed. The A1c target of <7% according to ADA and <6.5% according to AACE were discussed.                Orders This Visit:  No orders of the defined types were placed in this encounter.      Meds This Visit:  Requested Prescriptions     Signed Prescriptions Disp Refills    atorvastatin 40 MG Oral Tab 90 tablet 3     Sig: Take 1 tablet (40 mg total) by mouth nightly.    Dapagliflozin Pro-metFORMIN ER (XIGDUO XR) 5-1000 MG Oral Tablet 24 Hr 180 tablet 1     Sig: Take 1 tablet by mouth 2 (two) times daily.    losartan 25 MG Oral Tab 90 tablet 3     Sig: Take 1 tablet (25 mg total) by mouth daily.    metoprolol succinate ER 25 MG Oral Tablet 24 Hr 90 tablet 1     Sig: Take 1 tablet (25 mg total) by mouth daily.    Insulin Lispro Prot & Lispro (HUMALOG MIX 75/25 KWIKPEN) (75-25) 100 UNIT/ML SC SUPN 24 mL 3     Sig: INJECT 50  UNITS UNDER THE SKIN EVERY MORNING AND 45 UNITS EVERY EVENING    Insulin Lispro Prot & Lispro (HUMALOG MIX 75/25 KWIKPEN) (75-25) 100 UNIT/ML SC SUPN 24 mL 3     Sig: INJECT 48 UNITS UNDER THE SKIN EVERY MORNING AND 42 UNITS EVERY EVENING    TRULICITY 1.5 MG/0.5ML Subcutaneous Solution Pen-injector 18 mL 1     Sig: Inject 1.5 mg into the skin once a week.       Imaging & Referrals:  None         RADHA THORNTON MD

## 2024-04-16 ENCOUNTER — MED REC SCAN ONLY (OUTPATIENT)
Dept: FAMILY MEDICINE CLINIC | Facility: CLINIC | Age: 69
End: 2024-04-16

## 2024-05-21 RX ORDER — INSULIN LISPRO 100 [IU]/ML
INJECTION, SUSPENSION SUBCUTANEOUS
Qty: 60 ML | Refills: 4 | Status: SHIPPED | OUTPATIENT
Start: 2024-05-21

## 2024-05-21 NOTE — TELEPHONE ENCOUNTER
Please review. Protocol Failed; No Protocol    Requested Prescriptions   Pending Prescriptions Disp Refills    HUMALOG MIX 75/25 KWIKPEN (75-25) 100 UNIT/ML Subcutaneous Suspension Pen-injector [Pharmacy Med Name: HumaLOG Mix 75/25 KwikPen (75-25) 100 UNIT/ML Subcutaneous Suspension Pen-injector] 60 mL 4     Sig: INJECT 50 UNITS SUBCUTANEOUSLY  EVERY MORNING AND 45 UNITS EVERY EVENING       Diabetes Medication Protocol Failed - 5/17/2024  9:16 PM        Failed - Last A1C < 7.5 and within past 6 months     Lab Results   Component Value Date    A1C 9.0 (A) 03/04/2024             Passed - In person appointment or virtual visit in the past 6 mos or appointment in next 3 mos     Recent Outpatient Visits              1 month ago Uncontrolled type 2 diabetes mellitus with hyperglycemia (HCC)    [facility] [address], Austin Mariano MD    Office Visit    2 months ago Lumbar stenosis with neurogenic claudication    [facility]    [facility]    2 months ago Medicare annual wellness visit, subsequent    [facility] [address] [facility]    Office Visit    6 months ago Primary osteoarthritis of right knee    [facility] [address] [facility]    Office Visit    6 months ago Chronic pain of right knee    [facility] [address] [facility]    Office Visit                      Passed - Microalbumin procedure in past 12 months or taking ACE/ARB        Passed - EGFRCR or GFRNAA > 50     GFR Evaluation  EGFRCR: 95 , resulted on 3/6/2024          Passed - GFR in the past 12 months                 Recent Outpatient Visits              1 month ago Uncontrolled type 2 diabetes mellitus with hyperglycemia (HCC)    [facility] [address] [facility]    Office Visit    2 months ago Lumbar  stenosis with neurogenic claudication    [facility]    [facility]    2 months ago Medicare annual wellness visit, subsequent    [facility] [address] Ricardo, MD    Office Visit    6 months ago Primary osteoarthritis of right knee    [facility]    Office Visit    6 months ago Chronic pain of right knee    [facility] [address] Ricardo, [place]    Office Visit

## 2024-05-29 RX ORDER — DAPAGLIFLOZIN AND METFORMIN HYDROCHLORIDE 5; 1000 MG/1; MG/1
1 TABLET, FILM COATED, EXTENDED RELEASE ORAL 2 TIMES DAILY
Qty: 200 TABLET | Refills: 3 | Status: SHIPPED | OUTPATIENT
Start: 2024-05-29

## 2024-05-29 RX ORDER — METOPROLOL SUCCINATE 25 MG/1
25 TABLET, EXTENDED RELEASE ORAL DAILY
Qty: 100 TABLET | Refills: 3 | Status: SHIPPED | OUTPATIENT
Start: 2024-05-29

## 2024-05-29 NOTE — TELEPHONE ENCOUNTER
Please review; protocol failed/ has no protocol    No active /future labs noted     Requested Prescriptions   Pending Prescriptions Disp Refills    Dapagliflozin Pro-metFORMIN ER (XIGDUO XR) 5-1000 MG Oral Tablet 24 Hr 200 tablet 2     Sig: Take 1 tablet by mouth 2 (two) times daily.       Diabetes Medication Protocol Failed - 5/25/2024 10:52 PM        Failed - Last A1C < 7.5 and within past 6 months     Lab Results   Component Value Date    A1C 9.0 (A) 03/04/2024             Passed - In person appointment or virtual visit in the past 6 mos or appointment in next 3 mos     Recent Outpatient Visits              2 months ago Uncontrolled type 2 diabetes mellitus with hyperglycemia (HCC)    [facility], Austin Mariano MD    Office Visit    2 months ago Lumbar stenosis with neurogenic claudication    [facility], Rojelio Hawk DO    Office Visit    2 months ago Medicare annual wellness visit, subsequent    [facility], Austin Mariano MD    Office Visit    7 months ago Primary osteoarthritis of right knee    [facility], Austin Mariano MD    Office Visit    7 months ago Chronic pain of right knee    [facility], Austin Mariano MD    Office Visit                      Passed - Microalbumin procedure in past 12 months or taking ACE/ARB        Passed - EGFRCR or GFRNAA > 50     GFR Evaluation  EGFRCR: 95 , resulted on 3/6/2024          Passed - GFR in the past 12 months         Signed Prescriptions Disp Refills    metoprolol succinate ER 25 MG Oral Tablet 24 Hr 100 tablet 3     Sig: Take 1 tablet (25 mg total) by mouth daily.       Hypertension Medications Protocol Passed - 5/25/2024 10:52 PM        Passed - CMP or BMP in past 12 months        Passed - Last BP reading less than 140/90     BP Readings from Last 1  Encounters:   03/26/24 120/70               Passed - In person appointment or virtual visit in the past 12 mos or appointment in next 3 mos     Recent Outpatient Visits              2 months ago Uncontrolled type 2 diabetes mellitus with hyperglycemia (HCC)    [facility] [address] Ricardo, MD    Office Visit    2 months ago Lumbar stenosis with neurogenic claudication    [facility]    [facility] Visit    2 months ago Medicare annual wellness visit, subsequent    [facility] [address] [place] Ricardo, MD    Office Visit    7 months ago Primary osteoarthritis of right knee    [facility] [address] [facility]    Office Visit    7 months ago Chronic pain of right knee    [facility]    [facility]                      Passed - EGFRCR or GFRNAA > 50     GFR Evaluation  EGFRCR: 95 , resulted on 3/6/2024             Recent Outpatient Visits              2 months ago Uncontrolled type 2 diabetes mellitus with hyperglycemia (HCC)    [facility] [address] [place] Ricardo, MD    Office Visit    2 months ago Lumbar stenosis with neurogenic claudication    [facility]    [facility] Visit    2 months ago Medicare annual wellness visit, subsequent    [facility] [address] Ricardo, MD    Office Visit    7 months ago Primary osteoarthritis of right knee    [facility] [facility]    Office Visit    7 months ago Chronic pain of right knee    [facility] [address] [place] Ricardo, MD    Office Visit

## 2024-05-29 NOTE — TELEPHONE ENCOUNTER
Refill passed per [facility] protocol.  Requested Prescriptions   Pending Prescriptions Disp Refills    XIGDUO XR 5-1000 MG Oral Tablet 24 Hr [Pharmacy Med Name: XIGDUO  5MG 1000MG  TAB  XR] 200 tablet 2     Sig: TAKE 1 TABLET BY MOUTH TWICE  DAILY       Diabetes Medication Protocol Failed - 5/25/2024 10:52 PM        Failed - Last A1C < 7.5 and within past 6 months     Lab Results   Component Value Date    A1C 9.0 (A) 03/04/2024             Passed - In person appointment or virtual visit in the past 6 mos or appointment in next 3 mos     Recent Outpatient Visits              2 months ago Uncontrolled type 2 diabetes mellitus with hyperglycemia (HCC)    [facility], Austin Mariano MD    Office Visit    2 months ago Lumbar stenosis with neurogenic claudication    [facility] Fang [facility]    Office Visit    2 months ago Medicare annual wellness visit, subsequent    [facility], Austin Mariano MD    Office Visit    7 months ago [facility] oste[facility] right knee    [facility]    [facility] Visit    7 months ago Chronic pain of right knee    [facility], Austin Mariano MD    Office Visit                      Passed - Microalbumin procedure in past 12 months or taking ACE/ARB        Passed - EGFRCR or GFRNAA > 50     GFR Evaluation  EGFRCR: 95 , resulted on 3/6/2024          Passed - GFR in the past 12 months          METOPROLOL SUCCINATE ER 25 MG Oral Tablet 24 Hr [Pharmacy Med Name: Metoprolol Succinate ER 25 MG Oral Tablet Extended Release 24 Hour] 100 tablet 2     Sig: TAKE 1 TABLET BY MOUTH DAILY       Hypertension Medications Protocol Passed - 5/25/2024 10:52 PM        Passed - CMP or BMP in past 12 months        Passed - Last BP reading less than 140/90     BP Readings from  Last 1 Encounters:   03/26/24 120/70               Passed - In person appointment or virtual visit in the past 12 mos or appointment in next 3 mos     Recent Outpatient Visits              2 months ago Uncontrolled type 2 diabetes mellitus with hyperglycemia (HCC)    [facility]    Office Visit    2 months ago Lumbar stenosis [facility] neurogenic claud[facility]    [facility]    2 months ago Medicare annual wellness visit, subsequent    [facility]    Office Visit    7 months ago Primary osteoarthritis of right knee    [facility]    Office Visit    7 months ago Chronic pain of right knee    [facility] Ricardo, MD    Office Visit                      Passed - EGFRCR or GFRNAA > 50     GFR Evaluation  EGFRCR: 95 , resulted on 3/6/2024             Recent Outpatient Visits              2 months ago Uncontrolled type 2 diabetes mellitus with hyperglycemia (HCC)    [facility]    Office Visit    2 months ago Lumbar stenosis with neurogenic claudication    [facility]    [facility] Visit    2 months ago Medicare annual wellness visit, subsequent    [facility]    Office Visit    7 months ago Primary osteoarthritis of right knee    [facility]    Office Visit    7 months ago Chronic pain of right knee    [facility] Ricardo, MD    Office Visit

## 2024-07-08 ENCOUNTER — OFFICE VISIT (OUTPATIENT)
Dept: FAMILY MEDICINE CLINIC | Facility: CLINIC | Age: 69
End: 2024-07-08

## 2024-07-08 VITALS
HEIGHT: 66 IN | HEART RATE: 75 BPM | SYSTOLIC BLOOD PRESSURE: 122 MMHG | BODY MASS INDEX: 41.27 KG/M2 | DIASTOLIC BLOOD PRESSURE: 74 MMHG | WEIGHT: 256.81 LBS

## 2024-07-08 DIAGNOSIS — E11.65 UNCONTROLLED TYPE 2 DIABETES MELLITUS WITH HYPERGLYCEMIA (HCC): Primary | ICD-10-CM

## 2024-07-08 DIAGNOSIS — K64.4 EXTERNAL HEMORRHOIDS: ICD-10-CM

## 2024-07-08 PROCEDURE — 1126F AMNT PAIN NOTED NONE PRSNT: CPT | Performed by: FAMILY MEDICINE

## 2024-07-08 PROCEDURE — 99213 OFFICE O/P EST LOW 20 MIN: CPT | Performed by: FAMILY MEDICINE

## 2024-07-08 PROCEDURE — 3078F DIAST BP <80 MM HG: CPT | Performed by: FAMILY MEDICINE

## 2024-07-08 PROCEDURE — 3008F BODY MASS INDEX DOCD: CPT | Performed by: FAMILY MEDICINE

## 2024-07-08 PROCEDURE — 1159F MED LIST DOCD IN RCRD: CPT | Performed by: FAMILY MEDICINE

## 2024-07-08 PROCEDURE — 3074F SYST BP LT 130 MM HG: CPT | Performed by: FAMILY MEDICINE

## 2024-07-08 RX ORDER — HYDROCORTISONE 25 MG/G
1 CREAM TOPICAL 2 TIMES DAILY
Qty: 28 G | Refills: 1 | Status: SHIPPED | OUTPATIENT
Start: 2024-07-08

## 2024-07-08 RX ORDER — ATORVASTATIN CALCIUM 40 MG/1
40 TABLET, FILM COATED ORAL NIGHTLY
Qty: 90 TABLET | Refills: 3 | Status: SHIPPED | OUTPATIENT
Start: 2024-07-08

## 2024-07-08 RX ORDER — INSULIN LISPRO 100 [IU]/ML
INJECTION, SUSPENSION SUBCUTANEOUS
Qty: 60 ML | Refills: 4 | Status: SHIPPED | OUTPATIENT
Start: 2024-07-08

## 2024-07-08 RX ORDER — LOSARTAN POTASSIUM 25 MG/1
25 TABLET ORAL DAILY
Qty: 90 TABLET | Refills: 3 | Status: SHIPPED | OUTPATIENT
Start: 2024-07-08

## 2024-07-08 RX ORDER — DULAGLUTIDE 1.5 MG/.5ML
1.5 INJECTION, SOLUTION SUBCUTANEOUS WEEKLY
Qty: 18 ML | Refills: 1 | Status: SHIPPED | OUTPATIENT
Start: 2024-07-08

## 2024-07-08 RX ORDER — PEN NEEDLE, DIABETIC 31 G X1/4"
NEEDLE, DISPOSABLE MISCELLANEOUS
Qty: 400 EACH | Refills: 3 | Status: SHIPPED | OUTPATIENT
Start: 2024-07-08

## 2024-07-08 RX ORDER — TAMSULOSIN HYDROCHLORIDE 0.4 MG/1
CAPSULE ORAL
Qty: 90 CAPSULE | Refills: 2 | Status: SHIPPED | OUTPATIENT
Start: 2024-07-08

## 2024-07-08 RX ORDER — DAPAGLIFLOZIN AND METFORMIN HYDROCHLORIDE 5; 1000 MG/1; MG/1
1 TABLET, FILM COATED, EXTENDED RELEASE ORAL 2 TIMES DAILY
Qty: 200 TABLET | Refills: 3 | Status: SHIPPED | OUTPATIENT
Start: 2024-07-08

## 2024-07-08 RX ORDER — METOPROLOL SUCCINATE 25 MG/1
25 TABLET, EXTENDED RELEASE ORAL DAILY
Qty: 100 TABLET | Refills: 3 | Status: SHIPPED | OUTPATIENT
Start: 2024-07-08

## 2024-07-08 NOTE — PROGRESS NOTES
7/8/2024  11:55 AM    Beto Andrea is a 68 year old male.    Chief complaint(s):   Chief Complaint   Patient presents with    Diabetes     F/u pt states going to [place] needs refills for 1 month     Hemorrhoids     HPI:     Beto Andrea primary complaint is regarding diabetes.     Patient Beto Andrea is a 68 year old male is here to be evaluated for type 2 diabetes.  Specifically, male has type 2, insulin none requiring diabetes. Compliance with treatment has been POOR.  Patient's diabetes was first diagnosed >15 years ago.  Patient follows a 2000 calorie ADA diet.  Patient report experiencing the following diabetes related symptoms; Positive for polyuria, Positive for polydipsia, Positive for blurred vision.  Depression symptoms include none.  Tobacco screen: none  smoker.  Current meds include : Not taking his mediations nor did he bring him accucheck #s  oral hypoglycemic include: Xigduo Xr 5-1000mg, Trulicity 1.5 weekly  insulin/injectable : Trulciity 1.5 weekly, Humalog 75/25 increased to 50u Q am, 45u Q pm  Hypoglycemia severity is not applicable.he reports home blood glucose readings have been 128 (#s) and believes  having fair glucose control.  Most recent lab results include glycohemoglobin 9.0%, microalbuminuria have been ?.  In regard to preventative care, his last ophthalmology exam was in >2 months ago.  Opthalmic evaluation have shown no pathology.  Concurrent relative health problems include HTN, hyperlipidemia.     In addition patient has a history of external hemorrhoids.  Recently starting to cause some itchiness and burning sensation as well as light bleeding.  Patient had a colonoscopy 3 years ago was essentially normal except for polyps and hemorrhoids.  He was not due for another colonoscopy until 7 years.    HISTORY:  Past Medical History:    Cataracts, bilateral    Diabetes (HCC)    Essential hypertension    High blood pressure    Hyperlipidemia      Past Surgical History:    Procedure Laterality Date    Colonoscopy N/A 2020    Procedure: COLONOSCOPY;  Surgeon: Zbigniew Hooker MD;  Location: [facility] ENDOSCOPY    Laminectomy      Ligmt revision,knee,intra-artic Left       Family History   Problem Relation Age of Onset    Diabetes Mother     Diabetes Sister       Social History:   Social History     Socioeconomic History    Marital status:    Tobacco Use    Smoking status: Former     Current packs/day: 0.00     Types: Cigarettes     Start date: 6/10/1973     Quit date: 6/10/1993     Years since quittin.0    Smokeless tobacco: Never   Vaping Use    Vaping status: Never Used   Substance and Sexual Activity    Alcohol use: Yes    Drug use: No   Other Topics Concern    Caffeine Concern Yes     Comment: daily 2 cups    Exercise Yes     Comment: walking 20 min daily   Social History Narrative    The patient does not use an assistive device..      The patient does live in a home with stairs.        Immunizations:   Immunization History   Administered Date(s) Administered    Covid-19 Vaccine Pfizer 30 mcg/0.3 ml 2021    Covid-19 Vaccine Pfizer Bivalent 30mcg/0.3mL 2022    FLU VAC High Dose 65 YRS & Older PRSV Free (75411) 2021    FLULAVAL 6 months & older 0.5 ml Prefilled syringe (87093) 2020    FLUZONE 6 months and older PFS 0.5 ml (38138) 2017, 2018    Fluvirin, 3 Years & >, Im 2013, 10/21/2014    HEP A/HEP B Combined 2016    HEP B 2015, 2015    Hep B, Unspecified Formulation 2015, 2015    Influenza 2015, 2016    Pfizer Covid-19 Vaccine 30mcg/0.3ml 12yrs+ (5073-0200) 2023    Pneumovax 23 05/15/2020    TDAP 2015, 2019    Zoster Vaccine Live (Zostavax) 2014, 2017    Zoster Vaccine Recombinant Adjuvanted (Shingrix) 10/11/2022, 2023       Medications (Active prior to today's visit):  Current Outpatient Medications   Medication Sig Dispense Refill     atorvastatin 40 MG Oral Tab Take 1 tablet (40 mg total) by mouth nightly. 90 tablet 3    Dapagliflozin Pro-metFORMIN ER (XIGDUO XR) 5-1000 MG Oral Tablet 24 Hr Take 1 tablet by mouth 2 (two) times daily. 200 tablet 3    Insulin Lispro Prot & Lispro (HUMALOG MIX 75/25 KWIKPEN) (75-25) 100 UNIT/ML SC SUPN INJECT 50 UNITS SUBCUTANEOUSLY  EVERY MORNING AND 45 UNITS EVERY EVENING 60 mL 4    Insulin Pen Needle (TECHLITE PEN NEEDLES) 32G X 6 MM Does not apply Misc Use as directed 4 times daily 400 each 3    losartan 25 MG Oral Tab Take 1 tablet (25 mg total) by mouth daily. 90 tablet 3    metoprolol succinate ER 25 MG Oral Tablet 24 Hr Take 1 tablet (25 mg total) by mouth daily. 100 tablet 3    tamsulosin 0.4 MG Oral Cap TAKE 1 CAPSULE BY MOUTH EVERY DAY 90 capsule 2    TRULICITY 1.5 MG/0.5ML Subcutaneous Solution Pen-injector Inject 1.5 mg into the skin once a week. 18 mL 1    hydrocortisone (ANUSOL-HC) 2.5 % External Cream Place 1 Application rectally 2 (two) times daily. 28 g 1    pregabalin 150 MG Oral Cap Take 1 capsule (150 mg total) by mouth every evening. 90 capsule 0    amitriptyline 50 MG Oral Tab Take 1 tablet (50 mg total) by mouth nightly. 90 tablet 2    ergocalciferol 1.25 MG (38773 UT) Oral Cap Take 1 capsule (50,000 Units total) by mouth once a week. 12 capsule 3    Accu-Chek Softclix Lancets Does not apply Misc 1 Lancet by Finger stick route in the morning and 1 Lancet before bedtime. 200 each 3    celecoxib (CELEBREX) 200 MG Oral Cap Take 1 capsule (200 mg total) by mouth daily. 90 capsule 1    Glucose Blood (ACCU-CHEK DAXA PLUS) In Vitro Strip TEST TWICE DAILY 200 strip 3    tiZANidine 4 MG Oral Tab Take 1 tablet (4 mg total) by mouth every 6 (six) hours as needed. 60 tablet 0    Alcohol Swabs (ALCOHOL PADS) 70 % Does not apply Pads For use prior to checking sugar or giving insulin      aspirin EC 81 MG Oral Tab EC Take 1 tablet (81 mg total) by mouth daily. 100 tablet 2       Allergies:  No Known  Allergies      ROS:   Review of Systems   Constitutional:  Negative for appetite change, fatigue and fever.   Respiratory:  Negative for shortness of breath.    Cardiovascular:  Negative for chest pain.   Gastrointestinal:  Positive for anal bleeding and rectal pain. Negative for abdominal pain, diarrhea, nausea and vomiting.   Endocrine: Negative for polydipsia and polyuria.   Musculoskeletal:  Negative for myalgias.   Skin:  Negative for rash.   Neurological:  Negative for dizziness, weakness and headaches.       PHYSICAL EXAM:   VS: /74   Pulse 75   Ht 5' 6\" (1.676 m)   Wt 256 lb 12.8 oz (116.5 kg)   BMI 41.45 kg/m²     Physical Exam  Vitals reviewed.   Constitutional:       Appearance: Normal appearance. He is well-developed.   HENT:      Head: Normocephalic.   Eyes:      General: No scleral icterus.     Conjunctiva/sclera: Conjunctivae normal.   Cardiovascular:      Rate and Rhythm: Normal rate.   Pulmonary:      Effort: Pulmonary effort is normal.   Musculoskeletal:      Cervical back: Neck supple.   Skin:     Findings: No rash.   Psychiatric:         Mood and Affect: Mood normal.         LABORATORY RESULTS:     EKG / Spirometry : -     Radiology: No results found.     ASSESSMENT/PLAN:   Assessment   Encounter Diagnoses   Name Primary?    Uncontrolled type 2 diabetes mellitus with hyperglycemia (HCC) Yes    External hemorrhoids          MEDICATIONS: Take all medications as directed        atorvastatin 40 MG Oral Tab, Take 1 tablet (40 mg total) by mouth nightly., Disp: 90 tablet, Rfl: 3    Dapagliflozin Pro-metFORMIN ER (XIGDUO XR) 5-1000 MG Oral Tablet 24 Hr, Take 1 tablet by mouth 2 (two) times daily., Disp: 200 tablet, Rfl: 3    Insulin Lispro Prot & Lispro (HUMALOG MIX 75/25 KWIKPEN) (75-25) 100 UNIT/ML SC SUPN, INJECT 50 UNITS SUBCUTANEOUSLY  EVERY MORNING AND 45 UNITS EVERY EVENING, Disp: 60 mL, Rfl: 4    Insulin Pen Needle (TECHLITE PEN NEEDLES) 32G X 6 MM Does not apply Misc, Use as directed  4 times daily, Disp: 400 each, Rfl: 3    losartan 25 MG Oral Tab, Take 1 tablet (25 mg total) by mouth daily., Disp: 90 tablet, Rfl: 3    metoprolol succinate ER 25 MG Oral Tablet 24 Hr, Take 1 tablet (25 mg total) by mouth daily., Disp: 100 tablet, Rfl: 3    tamsulosin 0.4 MG Oral Cap, TAKE 1 CAPSULE BY MOUTH EVERY DAY, Disp: 90 capsule, Rfl: 2    TRULICITY 1.5 MG/0.5ML Subcutaneous Solution Pen-injector, Inject 1.5 mg into the skin once a week., Disp: 18 mL, Rfl: 1    hydrocortisone (ANUSOL-HC) 2.5 % External Cream, Place 1 Application rectally 2 (two) times daily., Disp: 28 g, Rfl: 1    pregabalin 150 MG Oral Cap, Take 1 capsule (150 mg total) by mouth every evening., Disp: 90 capsule, Rfl: 0    amitriptyline 50 MG Oral Tab, Take 1 tablet (50 mg total) by mouth nightly., Disp: 90 tablet, Rfl: 2    ergocalciferol 1.25 MG (75380 UT) Oral Cap, Take 1 capsule (50,000 Units total) by mouth once a week., Disp: 12 capsule, Rfl: 3    Accu-Chek Softclix Lancets Does not apply Misc, 1 Lancet by Finger stick route in the morning and 1 Lancet before bedtime., Disp: 200 each, Rfl: 3    celecoxib (CELEBREX) 200 MG Oral Cap, Take 1 capsule (200 mg total) by mouth daily., Disp: 90 capsule, Rfl: 1    Glucose Blood (ACCU-CHEK DAXA PLUS) In Vitro Strip, TEST TWICE DAILY, Disp: 200 strip, Rfl: 3    tiZANidine 4 MG Oral Tab, Take 1 tablet (4 mg total) by mouth every 6 (six) hours as needed., Disp: 60 tablet, Rfl: 0    Alcohol Swabs (ALCOHOL PADS) 70 % Does not apply Pads, For use prior to checking sugar or giving insulin, Disp: , Rfl:     aspirin EC 81 MG Oral Tab EC, Take 1 tablet (81 mg total) by mouth daily., Disp: 100 tablet, Rfl: 2         Refills Given today:    Requested Prescriptions     Signed Prescriptions Disp Refills    atorvastatin 40 MG Oral Tab 90 tablet 3     Sig: Take 1 tablet (40 mg total) by mouth nightly.    Dapagliflozin Pro-metFORMIN ER (XIGDUO XR) 5-1000 MG Oral Tablet 24 Hr 200 tablet 3     Sig: Take 1 tablet  by mouth 2 (two) times daily.    Insulin Lispro Prot & Lispro (HUMALOG MIX 75/25 KWIKPEN) (75-25) 100 UNIT/ML SC SUPN 60 mL 4     Sig: INJECT 50 UNITS SUBCUTANEOUSLY  EVERY MORNING AND 45 UNITS EVERY EVENING    Insulin Pen Needle (TECHLITE PEN NEEDLES) 32G X 6 MM Does not apply Misc 400 each 3     Sig: Use as directed 4 times daily    losartan 25 MG Oral Tab 90 tablet 3     Sig: Take 1 tablet (25 mg total) by mouth daily.    metoprolol succinate ER 25 MG Oral Tablet 24 Hr 100 tablet 3     Sig: Take 1 tablet (25 mg total) by mouth daily.    tamsulosin 0.4 MG Oral Cap 90 capsule 2     Sig: TAKE 1 CAPSULE BY MOUTH EVERY DAY    TRULICITY 1.5 MG/0.5ML Subcutaneous Solution Pen-injector 18 mL 1     Sig: Inject 1.5 mg into the skin once a week.    hydrocortisone (ANUSOL-HC) 2.5 % External Cream 28 g 1     Sig: Place 1 Application rectally 2 (two) times daily.        RECOMMENDATIONS given include: Patient was reassured of  his medical condition and all questions and concerns were answered. Patient was informed to please, call our office with any new or further questions or concerns that may come up in the near future. Notify Dr Clarke or the [facility] if there is a deterioration or worsening of the medical condition. Also, inform the doctor with any new symptoms or medications' side effects.      FOLLOW-UP: Schedule a follow-up visit in 1 month.             Orders This Visit:  No orders of the defined types were placed in this encounter.      Meds This Visit:  Requested Prescriptions     Signed Prescriptions Disp Refills    atorvastatin 40 MG Oral Tab 90 tablet 3     Sig: Take 1 tablet (40 mg total) by mouth nightly.    Dapagliflozin Pro-metFORMIN ER (XIGDUO XR) 5-1000 MG Oral Tablet 24 Hr 200 tablet 3     Sig: Take 1 tablet by mouth 2 (two) times daily.    Insulin Lispro Prot & Lispro (HUMALOG MIX 75/25 KWIKPEN) (75-25) 100 UNIT/ML SC SUPN 60 mL 4     Sig: INJECT 50 UNITS SUBCUTANEOUSLY  EVERY MORNING AND 45 UNITS  EVERY EVENING    Insulin Pen Needle (TECHLITE PEN NEEDLES) 32G X 6 MM Does not apply Misc 400 each 3     Sig: Use as directed 4 times daily    losartan 25 MG Oral Tab 90 tablet 3     Sig: Take 1 tablet (25 mg total) by mouth daily.    metoprolol succinate ER 25 MG Oral Tablet 24 Hr 100 tablet 3     Sig: Take 1 tablet (25 mg total) by mouth daily.    tamsulosin 0.4 MG Oral Cap 90 capsule 2     Sig: TAKE 1 CAPSULE BY MOUTH EVERY DAY    TRULICITY 1.5 MG/0.5ML Subcutaneous Solution Pen-injector 18 mL 1     Sig: Inject 1.5 mg into the skin once a week.    hydrocortisone (ANUSOL-HC) 2.5 % External Cream 28 g 1     Sig: Place 1 Application rectally 2 (two) times daily.       Imaging & Referrals:  None         RADHA THORNTON MD

## 2024-07-09 ENCOUNTER — OFFICE VISIT (OUTPATIENT)
Dept: PHYSICAL MEDICINE AND REHAB | Facility: CLINIC | Age: 69
End: 2024-07-09
Payer: COMMERCIAL

## 2024-07-09 VITALS — WEIGHT: 255 LBS | HEART RATE: 81 BPM | BODY MASS INDEX: 41 KG/M2 | OXYGEN SATURATION: 94 %

## 2024-07-09 DIAGNOSIS — M48.062 LUMBAR STENOSIS WITH NEUROGENIC CLAUDICATION: ICD-10-CM

## 2024-07-09 DIAGNOSIS — E66.01 CLASS 3 SEVERE OBESITY DUE TO EXCESS CALORIES WITH BODY MASS INDEX (BMI) OF 40.0 TO 44.9 IN ADULT, UNSPECIFIED WHETHER SERIOUS COMORBIDITY PRESENT (HCC): Primary | ICD-10-CM

## 2024-07-09 DIAGNOSIS — G62.9 POLYNEUROPATHY: ICD-10-CM

## 2024-07-09 PROCEDURE — 1125F AMNT PAIN NOTED PAIN PRSNT: CPT | Performed by: PHYSICAL MEDICINE & REHABILITATION

## 2024-07-09 PROCEDURE — 99214 OFFICE O/P EST MOD 30 MIN: CPT | Performed by: PHYSICAL MEDICINE & REHABILITATION

## 2024-07-09 PROCEDURE — 1159F MED LIST DOCD IN RCRD: CPT | Performed by: PHYSICAL MEDICINE & REHABILITATION

## 2024-07-09 RX ORDER — AMITRIPTYLINE HYDROCHLORIDE 50 MG/1
50 TABLET, FILM COATED ORAL NIGHTLY
Qty: 90 TABLET | Refills: 2 | Status: SHIPPED | OUTPATIENT
Start: 2024-07-09

## 2024-07-09 RX ORDER — PREGABALIN 150 MG/1
150 CAPSULE ORAL EVERY EVENING
Qty: 90 CAPSULE | Refills: 0 | Status: SHIPPED | OUTPATIENT
Start: 2024-07-09

## 2024-07-09 NOTE — PROGRESS NOTES
Progress note    C/C:   Chief Complaint   Patient presents with    Follow - Up     03/19/24 LOV. Patient is here to f/u on his low back pain. He does not want anymore injections and he has not seen a neurosurgeon yet. Pain 7/10. T/n in R lateral leg and R foot. Pregabalin and amitriptyline nightly.       HPI: 68 year old male presents for follow up. Since last seen more active, though not much has changed. Able to stand and walk with mild pain and working through the pain. He has both back and leg pain, back greater than leg, with radiation into the right lower lateral leg. He has significant knee pain with standing and walking, but when standing and walking does feel back pain shooting down the right leg. He has numbness and tingling in both legs, right much worse than left, to the lateral aspect of the right lower leg. Numbness and tingling, burning also occurs in the sole of the feet.     Pertinent allergies: No Known Allergies     Physical exam:  Pulse 81   Wt 255 lb (115.7 kg)   SpO2 94%   BMI 41.16 kg/m²      Lumbar spine exam:    No skin rash lumbar/upper sacral region  No pain with lumbar flexion. mild pain with lumbar extension.  No tenderness to palpation bilateral lumbar paraspinals. No ttp bilateral PSIS.  5/5 LE strength b/l  1/4 quad, gastrocs reflexes b/l  SLR + right    No pain with right knee flexion or extension  No medial/lateral joint line tenderness, patellar facet tenderness medially or laterally  No appreciable joint effusion    Imaging: No new imaging to review    Assessment and plan  Diabetes  Mild primary osteoarthritis of right knee  Polyneuropathy, attributed to diabetes  Osteopenia    Recommend he continue pregabalin 150mg qDaily, amitriptyline 50mg at night time. I encouraged his weight loss efforts, which will also help to reduce lower back pain and right knee pain in the long term. He will attempt to lose weight on his own, and if no better he should discuss seeing the weight loss  management service with his primary care physician. Consider physical therapy for neutral to flexion lumbar stabilization.     Follow up in 3 months.     30 minutes spent precharting, conducting H&P, discussing treatment options, completing documentation.    Rojelio Mercedes DO  Physical Medicine and Rehabilitation  [facility]

## 2024-07-09 NOTE — PATIENT INSTRUCTIONS
Continue with weight loss attempts. If not able to do it on your own discuss weight loss.     Try taking the pregabalin in the morning and amitriptylie at night time. If causing you to be drowsy during the daytime continue taking both medications at night time.

## 2024-07-27 ENCOUNTER — MED REC SCAN ONLY (OUTPATIENT)
Dept: FAMILY MEDICINE CLINIC | Facility: CLINIC | Age: 69
End: 2024-07-27

## 2024-08-05 ENCOUNTER — OFFICE VISIT (OUTPATIENT)
Dept: FAMILY MEDICINE CLINIC | Facility: CLINIC | Age: 69
End: 2024-08-05

## 2024-08-05 ENCOUNTER — LAB ENCOUNTER (OUTPATIENT)
Dept: LAB | Age: 69
End: 2024-08-05
Attending: FAMILY MEDICINE
Payer: MEDICARE

## 2024-08-05 VITALS
WEIGHT: 252 LBS | HEIGHT: 66 IN | DIASTOLIC BLOOD PRESSURE: 69 MMHG | SYSTOLIC BLOOD PRESSURE: 111 MMHG | HEART RATE: 73 BPM | BODY MASS INDEX: 40.5 KG/M2

## 2024-08-05 DIAGNOSIS — Z79.4 TYPE 2 DIABETES MELLITUS WITH DIABETIC POLYNEUROPATHY, WITH LONG-TERM CURRENT USE OF INSULIN (HCC): ICD-10-CM

## 2024-08-05 DIAGNOSIS — E11.42 TYPE 2 DIABETES MELLITUS WITH DIABETIC POLYNEUROPATHY, WITH LONG-TERM CURRENT USE OF INSULIN (HCC): ICD-10-CM

## 2024-08-05 DIAGNOSIS — I10 ESSENTIAL HYPERTENSION: ICD-10-CM

## 2024-08-05 LAB
ALBUMIN SERPL-MCNC: 4.8 G/DL (ref 3.2–4.8)
ALBUMIN/GLOB SERPL: 1.7 {RATIO} (ref 1–2)
ALP LIVER SERPL-CCNC: 79 U/L
ALT SERPL-CCNC: 19 U/L
ANION GAP SERPL CALC-SCNC: 7 MMOL/L (ref 0–18)
AST SERPL-CCNC: 17 U/L (ref ?–34)
BILIRUB SERPL-MCNC: 1.1 MG/DL (ref 0.2–1.1)
BUN BLD-MCNC: 8 MG/DL (ref 9–23)
BUN/CREAT SERPL: 10.3 (ref 10–20)
CALCIUM BLD-MCNC: 10 MG/DL (ref 8.7–10.4)
CARTRIDGE EXPIRATION DATE: ABNORMAL DATE
CHLORIDE SERPL-SCNC: 107 MMOL/L (ref 98–112)
CO2 SERPL-SCNC: 26 MMOL/L (ref 21–32)
CREAT BLD-MCNC: 0.78 MG/DL
CREAT UR-SCNC: 89.1 MG/DL
EGFRCR SERPLBLD CKD-EPI 2021: 97 ML/MIN/1.73M2 (ref 60–?)
FASTING STATUS PATIENT QL REPORTED: YES
GLOBULIN PLAS-MCNC: 2.8 G/DL (ref 2–3.5)
GLUCOSE BLD-MCNC: 132 MG/DL (ref 70–99)
HEMOGLOBIN A1C: 8.2 % (ref 4.3–5.6)
MICROALBUMIN UR-MCNC: <0.3 MG/DL
OSMOLALITY SERPL CALC.SUM OF ELEC: 290 MOSM/KG (ref 275–295)
POTASSIUM SERPL-SCNC: 4.5 MMOL/L (ref 3.5–5.1)
PROT SERPL-MCNC: 7.6 G/DL (ref 5.7–8.2)
SODIUM SERPL-SCNC: 140 MMOL/L (ref 136–145)

## 2024-08-05 PROCEDURE — 83036 HEMOGLOBIN GLYCOSYLATED A1C: CPT | Performed by: FAMILY MEDICINE

## 2024-08-05 PROCEDURE — 82043 UR ALBUMIN QUANTITATIVE: CPT

## 2024-08-05 PROCEDURE — 36415 COLL VENOUS BLD VENIPUNCTURE: CPT

## 2024-08-05 PROCEDURE — 82570 ASSAY OF URINE CREATININE: CPT

## 2024-08-05 PROCEDURE — 1159F MED LIST DOCD IN RCRD: CPT | Performed by: FAMILY MEDICINE

## 2024-08-05 PROCEDURE — 99214 OFFICE O/P EST MOD 30 MIN: CPT | Performed by: FAMILY MEDICINE

## 2024-08-05 PROCEDURE — 3074F SYST BP LT 130 MM HG: CPT | Performed by: FAMILY MEDICINE

## 2024-08-05 PROCEDURE — 3078F DIAST BP <80 MM HG: CPT | Performed by: FAMILY MEDICINE

## 2024-08-05 PROCEDURE — 1125F AMNT PAIN NOTED PAIN PRSNT: CPT | Performed by: FAMILY MEDICINE

## 2024-08-05 PROCEDURE — 3052F HG A1C>EQUAL 8.0%<EQUAL 9.0%: CPT | Performed by: FAMILY MEDICINE

## 2024-08-05 PROCEDURE — 80053 COMPREHEN METABOLIC PANEL: CPT

## 2024-08-05 PROCEDURE — 3008F BODY MASS INDEX DOCD: CPT | Performed by: FAMILY MEDICINE

## 2024-08-05 RX ORDER — INSULIN LISPRO 100 [IU]/ML
INJECTION, SUSPENSION SUBCUTANEOUS
Qty: 60 ML | Refills: 4 | Status: SHIPPED | OUTPATIENT
Start: 2024-08-05

## 2024-08-05 NOTE — PROGRESS NOTES
8/5/2024  9:56 AM    Beto Andrea is a 68 year old male.    Chief complaint(s):   Chief Complaint   Patient presents with    Diabetes     flu    Foot Pain     MAKI    Back Pain     HPI:     Beto Andrea primary complaint is regarding DM, HTN.     Patient Beto Andrea is a 68 year old male is here to be evaluated for type 2 diabetes.  Specifically, male has type 2, insulin none requiring diabetes. Compliance with treatment has been POOR.  Patient's diabetes was first diagnosed >15 years ago.  Patient follows a 2000 calorie ADA diet.  Patient report experiencing the following diabetes related symptoms; Positive for polyuria, Positive for polydipsia, Positive for blurred vision.  Depression symptoms include none.  Tobacco screen: none  smoker.  Current meds include :   oral hypoglycemic include: Xigduo Xr 5-1000mg, Trulicity 1.5 weekly  insulin/injectable : Trulciity 1.5 weekly, Humalog 75/25 increased to 54u Q am, 45u Q pm  Hypoglycemia severity is not applicable.he reports home blood glucose readings have been 128 (#s) and believes  having fair glucose control.  Most recent lab results include glycohemoglobin 6.8%, microalbuminuria have been ?.  In regard to preventative care, his last ophthalmology exam was in >2 months ago.  Opthalmic evaluation have shown no pathology.  Concurrent relative health problems include HTN, hyperlipidemia.    Beto Andreais a 68 year old male presents with hypertension.  This was first diagnosed more than 5 years ago.  Current nonpharmacologic treatment includes low sodium diet, exercise, and meditation.  his current cardiac medication(s) regimen includes: Metoprolol 25 mg Q day, Losaratn 25 mg.  He has not kept a blood pressure diary, but states that his blood pressures have been fair controll.  he is tolerating his medication(s)  well without side effects.  Compliance with treatment has been good.     HISTORY:  Past Medical History:    Cataracts, bilateral     Diabetes (HCC)    Essential hypertension    High blood pressure    Hyperlipidemia      Past Surgical History:   Procedure Laterality Date    Colonoscopy N/A 2020    Procedure: COLONOSCOPY;  Surgeon: Zbigniew Hooker MD;  Location: [facility] ENDOSCOPY    Laminectomy      Ligmt revision,knee,intra-artic Left       Family History   Problem Relation Age of Onset    Diabetes Mother     Diabetes Sister       Social History:   Social History     Socioeconomic History    Marital status:    Tobacco Use    Smoking status: Former     Current packs/day: 0.00     Types: Cigarettes     Start date: 6/10/1973     Quit date: 6/10/1993     Years since quittin.1     Passive exposure: Never    Smokeless tobacco: Never   Vaping Use    Vaping status: Never Used   Substance and Sexual Activity    Alcohol use: Yes    Drug use: No   Other Topics Concern    Caffeine Concern Yes     Comment: daily 2 cups    Exercise Yes     Comment: walking 20 min daily   Social History Narrative    The patient does not use an assistive device..      The patient does live in a home with stairs.        Immunizations:   Immunization History   Administered Date(s) Administered    Covid-19 Vaccine Pfizer 30 mcg/0.3 ml 2021    Covid-19 Vaccine Pfizer Bivalent 30mcg/0.3mL 2022    FLU VAC High Dose 65 YRS & Older PRSV Free (60121) 2021    FLULAVAL 6 months & older 0.5 ml Prefilled syringe (88149) 2020    FLUZONE 6 months and older PFS 0.5 ml (14583) 2017, 2018    Fluvirin, 3 Years & >, Im 2013, 10/21/2014    HEP A/HEP B Combined 2016    HEP B 2015, 2015    Hep B, Unspecified Formulation 2015, 2015    Influenza 2015, 2016    Pfizer Covid-19 Vaccine 30mcg/0.3ml 12yrs+ ([id number]) 2023    Pneumovax 23 05/15/2020    TDAP 2015, 2019    Zoster Vaccine Live (Zostavax) 2014, 2017    Zoster Vaccine Recombinant Adjuvanted (Shingrix)  10/11/2022, 05/23/2023   Pended Date(s) Pended    Pneumococcal Conjugate PCV20 08/05/2024       Medications (Active prior to today's visit):  Current Outpatient Medications   Medication Sig Dispense Refill    Insulin Lispro Prot & Lispro (HUMALOG MIX 75/25 KWIKPEN) (75-25) 100 UNIT/ML SC SUPN INJECT 54 UNITS SUBCUTANEOUSLY  EVERY MORNING AND 45 UNITS EVERY EVENING 60 mL 4    pregabalin 150 MG Oral Cap Take 1 capsule (150 mg total) by mouth every evening. 90 capsule 0    amitriptyline 50 MG Oral Tab Take 1 tablet (50 mg total) by mouth nightly. 90 tablet 2    atorvastatin 40 MG Oral Tab Take 1 tablet (40 mg total) by mouth nightly. 90 tablet 3    Dapagliflozin Pro-metFORMIN ER (XIGDUO XR) 5-1000 MG Oral Tablet 24 Hr Take 1 tablet by mouth 2 (two) times daily. 200 tablet 3    Insulin Pen Needle (TECHLITE PEN NEEDLES) 32G X 6 MM Does not apply Misc Use as directed 4 times daily 400 each 3    losartan 25 MG Oral Tab Take 1 tablet (25 mg total) by mouth daily. 90 tablet 3    metoprolol succinate ER 25 MG Oral Tablet 24 Hr Take 1 tablet (25 mg total) by mouth daily. 100 tablet 3    tamsulosin 0.4 MG Oral Cap TAKE 1 CAPSULE BY MOUTH EVERY DAY 90 capsule 2    TRULICITY 1.5 MG/0.5ML Subcutaneous Solution Pen-injector Inject 1.5 mg into the skin once a week. 18 mL 1    hydrocortisone (ANUSOL-HC) 2.5 % External Cream Place 1 Application rectally 2 (two) times daily. 28 g 1    ergocalciferol 1.25 MG (45129 UT) Oral Cap Take 1 capsule (50,000 Units total) by mouth once a week. 12 capsule 3    Accu-Chek Softclix Lancets Does not apply Misc 1 Lancet by Finger stick route in the morning and 1 Lancet before bedtime. 200 each 3    Glucose Blood (ACCU-CHEK DAXA PLUS) In Vitro Strip TEST TWICE DAILY 200 strip 3    Alcohol Swabs (ALCOHOL PADS) 70 % Does not apply Pads For use prior to checking sugar or giving insulin      aspirin EC 81 MG Oral Tab EC Take 1 tablet (81 mg total) by mouth daily. 100 tablet 2    celecoxib (CELEBREX) 200  MG Oral Cap Take 1 capsule (200 mg total) by mouth daily. (Patient not taking: Reported on 8/5/2024) 90 capsule 1    tiZANidine 4 MG Oral Tab Take 1 tablet (4 mg total) by mouth every 6 (six) hours as needed. (Patient not taking: Reported on 8/5/2024) 60 tablet 0       Allergies:  No Known Allergies      ROS:   Review of Systems   Constitutional:  Negative for appetite change, fatigue and fever.   Respiratory:  Negative for shortness of breath.    Cardiovascular:  Negative for chest pain.   Gastrointestinal:  Negative for abdominal pain.   Endocrine: Negative for polydipsia and polyuria.   Musculoskeletal:  Negative for myalgias.   Skin:  Negative for rash.   Neurological:  Negative for dizziness, weakness and headaches.       PHYSICAL EXAM:   VS: /69 (BP Location: Right arm, Patient Position: Sitting, Cuff Size: large)   Pulse 73   Ht 5' 6\" (1.676 m)   Wt 252 lb (114.3 kg)   BMI 40.67 kg/m²     Physical Exam  Vitals reviewed.   Constitutional:       Appearance: Normal appearance. He is well-developed.   HENT:      Head: Normocephalic.   Eyes:      General: No scleral icterus.     Conjunctiva/sclera: Conjunctivae normal.   Cardiovascular:      Rate and Rhythm: Normal rate and regular rhythm.   Pulmonary:      Effort: Pulmonary effort is normal.      Breath sounds: Normal breath sounds.   Musculoskeletal:      Cervical back: Neck supple.   Feet:      Right foot:      Protective Sensation: 10 sites tested.  10 sites sensed.      Left foot:      Protective Sensation: 10 sites tested.  10 sites sensed.   Lymphadenopathy:      Comments: LEs no edema   Skin:     Findings: No rash.   Psychiatric:         Mood and Affect: Mood normal.         LABORATORY RESULTS:   No results found for: \"URCOLOR\", \"URCLA\", \"URINELEUK\", \"URINENITRITE\", \"URINEBLOOD\"   Results for orders placed or performed in visit on 08/05/24   POC Glycohemoglobin [68052]   Result Value Ref Range    HEMOGLOBIN A1C 8.2 (A) 4.3 - 5.6 %    Cartridge  Lot# [id number] Numeric    Cartridge Expiration Date 2,122,026 Date       EKG / Spirometry : -     Radiology: No results found.     ASSESSMENT/PLAN:   Assessment   Encounter Diagnoses   Name Primary?    Type 2 diabetes mellitus with diabetic polyneuropathy, with long-term current use of insulin (HCC)     Essential hypertension        1. Type 2 diabetes mellitus with diabetic polyneuropathy, with long-term current use of insulin (HCC)    DIABETES A&P    LABORATORY & ORDERS: Blood test(s) ordered today ;   Orders Placed This Encounter   Procedures    POC Glycohemoglobin [03745]    Microalb/Creat Ratio, Random Urine    Comp Metabolic Panel (14)    Prevnar 20 (PCV20) [81355]     Additional orders include:   MEDICATIONS:    Insulin Lispro Prot & Lispro (HUMALOG MIX 75/25 KWIKPEN) (75-25) 100 UNIT/ML SC SUPN, INJECT 54 UNITS SUBCUTANEOUSLY  EVERY MORNING AND 45 UNITS EVERY EVENING, Disp: 60 mL, Rfl: 4    pregabalin 150 MG Oral Cap, Take 1 capsule (150 mg total) by mouth every evening., Disp: 90 capsule, Rfl: 0    amitriptyline 50 MG Oral Tab, Take 1 tablet (50 mg total) by mouth nightly., Disp: 90 tablet, Rfl: 2    atorvastatin 40 MG Oral Tab, Take 1 tablet (40 mg total) by mouth nightly., Disp: 90 tablet, Rfl: 3    Dapagliflozin Pro-metFORMIN ER (XIGDUO XR) 5-1000 MG Oral Tablet 24 Hr, Take 1 tablet by mouth 2 (two) times daily., Disp: 200 tablet, Rfl: 3    Insulin Pen Needle (TECHLITE PEN NEEDLES) 32G X 6 MM Does not apply Misc, Use as directed 4 times daily, Disp: 400 each, Rfl: 3    losartan 25 MG Oral Tab, Take 1 tablet (25 mg total) by mouth daily., Disp: 90 tablet, Rfl: 3    metoprolol succinate ER 25 MG Oral Tablet 24 Hr, Take 1 tablet (25 mg total) by mouth daily., Disp: 100 tablet, Rfl: 3    tamsulosin 0.4 MG Oral Cap, TAKE 1 CAPSULE BY MOUTH EVERY DAY, Disp: 90 capsule, Rfl: 2    TRULICITY 1.5 MG/0.5ML Subcutaneous Solution Pen-injector, Inject 1.5 mg into the skin once a week., Disp: 18 mL, Rfl: 1     hydrocortisone (ANUSOL-HC) 2.5 % External Cream, Place 1 Application rectally 2 (two) times daily., Disp: 28 g, Rfl: 1    ergocalciferol 1.25 MG (30120 UT) Oral Cap, Take 1 capsule (50,000 Units total) by mouth once a week., Disp: 12 capsule, Rfl: 3    Accu-Chek Softclix Lancets Does not apply Misc, 1 Lancet by Finger stick route in the morning and 1 Lancet before bedtime., Disp: 200 each, Rfl: 3    Glucose Blood (ACCU-CHEK DAXA PLUS) In Vitro Strip, TEST TWICE DAILY, Disp: 200 strip, Rfl: 3    Alcohol Swabs (ALCOHOL PADS) 70 % Does not apply Pads, For use prior to checking sugar or giving insulin, Disp: , Rfl:     aspirin EC 81 MG Oral Tab EC, Take 1 tablet (81 mg total) by mouth daily., Disp: 100 tablet, Rfl: 2    celecoxib (CELEBREX) 200 MG Oral Cap, Take 1 capsule (200 mg total) by mouth daily. (Patient not taking: Reported on 8/5/2024), Disp: 90 capsule, Rfl: 1    tiZANidine 4 MG Oral Tab, Take 1 tablet (4 mg total) by mouth every 6 (six) hours as needed. (Patient not taking: Reported on 8/5/2024), Disp: 60 tablet, Rfl: 0.  Requested Prescriptions     Signed Prescriptions Disp Refills    Insulin Lispro Prot & Lispro (HUMALOG MIX 75/25 KWIKPEN) (75-25) 100 UNIT/ML SC SUPN 60 mL 4     Sig: INJECT 54 UNITS SUBCUTANEOUSLY  EVERY MORNING AND 45 UNITS EVERY EVENING      REFERRALS:       Procedures    POC Glycohemoglobin [10278]    Microalb/Creat Ratio, Random Urine    Comp Metabolic Panel (14)     RECOMMENDATIONS: instructed in use of glucometer ( check fasting glucose multiple times a day), return for training in administering insulin injections, adherence to an 1800 calorie ADA diet, 10 pound weight loss, a graduated exercise program, HgbA1C level checked quarterly, daily foot self-inspection, need for yearly flu shots, and avoid all sodas, juices, candy, chocolates, cakes, ice cream, etc.      FOLLOW-UP: Schedule a follow-up visit in 6 months.       COUNSELING: The patient was counseled concerning the  relationship between diabetes control and macrovascular disease including cardiovascular, cerebrovascular and peripheral vascular disease. The patient was counseled concerning the relationship between diabetes control and retinopathy, nephropathy, and neuropathy. Advised as to the targets of pre-meal glucoses ( mg/dl) and post meal glucoses (<140-160 mg/dl) Home glucose testing discussed. The A1c target of <7% according to ADA and <6.5% according to AACE were discussed.             2. Essential hypertension     MEDICATIONS: CPM      RECOMMENDATIONS given include: avoid pseudoephedrine or other stimulants/decongestants in common cold remedies, decrease consumption of alcohol, perform routine monitoring of blood pressure with home blood pressure cuff, exercise, reduction of dietary salt intake, take medication as prescribed, try not to miss doses, smoking cessation, weight loss, and stress reduction.    FOLLOW-UP: Schedule a follow-up visit in 6 months.           Orders This Visit:  Orders Placed This Encounter   Procedures    POC Glycohemoglobin [71134]    Microalb/Creat Ratio, Random Urine    Comp Metabolic Panel (14)    Prevnar 20 (PCV20) [59436]       Meds This Visit:  Requested Prescriptions     Signed Prescriptions Disp Refills    Insulin Lispro Prot & Lispro (HUMALOG MIX 75/25 KWIKPEN) (75-25) 100 UNIT/ML SC SUPN 60 mL 4     Sig: INJECT 54 UNITS SUBCUTANEOUSLY  EVERY MORNING AND 45 UNITS EVERY EVENING       Imaging & Referrals:  PCV20 VACCINE FOR INTRAMUSCULAR USE         RADHA THORNTON MD

## 2024-08-07 ENCOUNTER — TELEPHONE (OUTPATIENT)
Dept: FAMILY MEDICINE CLINIC | Facility: CLINIC | Age: 69
End: 2024-08-07

## 2024-08-07 NOTE — TELEPHONE ENCOUNTER
Pt called back    Spoke with patient (identified name and ), results reviewed and agrees with plan.    Austin Clarke MD  2024  7:31 PM CDT       Please call patient, the following results are within normal limits:  CMP, UA.

## 2024-12-30 RX ORDER — TAMSULOSIN HYDROCHLORIDE 0.4 MG/1
CAPSULE ORAL
Qty: 100 CAPSULE | Refills: 3 | Status: SHIPPED | OUTPATIENT
Start: 2024-12-30

## 2024-12-30 NOTE — TELEPHONE ENCOUNTER
Refill passed per [facility] protocol.     Requested Prescriptions   Pending Prescriptions Disp Refills    TAMSULOSIN 0.4 MG Oral Cap [Pharmacy Med Name: Tamsulosin HCl 0.4 MG Oral Capsule] 100 capsule 2     Sig: TAKE 1 CAPSULE BY MOUTH DAILY       Genitourinary Medications Passed - 12/30/2024  1:44 PM        Passed - Patient does not have pulmonary hypertension on problem list        Passed - In person appointment or virtual visit in the past 12 mos or appointment in next 3 mos     Recent Outpatient Visits              4 months ago Type 2 diabetes mellitus with diabetic polyneuropathy, with long-term current use of insulin ([facility])    [facility], Austin Mariano MD    Office Visit    5 months ago Class 3 severe obesity due to excess calories with body mass index (BMI) of 40.0 to 44.9 in adult, unspecified whether serious comorbidity present ([facility])    [facility], Rojelio Hawk DO    Office Visit    5 months ago Uncontrolled type 2 diabetes mellitus with hyperglycemia ([facility]    [facility] Visit    9 months ago Uncontrolled type 2 diabetes mellitus with hyperglycemia ([facility]    [facility] Visit    9 months ago Lumbar stenosis with neurogenic claudication    [facility]    [facility] Visit

## 2025-03-06 ENCOUNTER — OFFICE VISIT (OUTPATIENT)
Dept: FAMILY MEDICINE CLINIC | Facility: CLINIC | Age: 70
End: 2025-03-06

## 2025-03-06 ENCOUNTER — OFFICE VISIT (OUTPATIENT)
Dept: PHYSICAL MEDICINE AND REHAB | Facility: CLINIC | Age: 70
End: 2025-03-06
Payer: COMMERCIAL

## 2025-03-06 VITALS — HEIGHT: 66 IN | BODY MASS INDEX: 41.95 KG/M2 | WEIGHT: 261 LBS

## 2025-03-06 VITALS
BODY MASS INDEX: 42.07 KG/M2 | WEIGHT: 261.81 LBS | HEART RATE: 66 BPM | SYSTOLIC BLOOD PRESSURE: 106 MMHG | TEMPERATURE: 97 F | DIASTOLIC BLOOD PRESSURE: 62 MMHG | HEIGHT: 66 IN

## 2025-03-06 DIAGNOSIS — M17.11 PRIMARY OSTEOARTHRITIS OF RIGHT KNEE: ICD-10-CM

## 2025-03-06 DIAGNOSIS — E11.42 TYPE 2 DIABETES MELLITUS WITH DIABETIC POLYNEUROPATHY, WITH LONG-TERM CURRENT USE OF INSULIN (HCC): ICD-10-CM

## 2025-03-06 DIAGNOSIS — Z00.00 MEDICARE ANNUAL WELLNESS VISIT, SUBSEQUENT: Primary | ICD-10-CM

## 2025-03-06 DIAGNOSIS — K59.01 SLOW TRANSIT CONSTIPATION: ICD-10-CM

## 2025-03-06 DIAGNOSIS — N40.1 BENIGN PROSTATIC HYPERPLASIA WITH NOCTURIA: ICD-10-CM

## 2025-03-06 DIAGNOSIS — G47.31 PRIMARY CENTRAL SLEEP APNEA: ICD-10-CM

## 2025-03-06 DIAGNOSIS — I73.9 INTERMITTENT CLAUDICATION: ICD-10-CM

## 2025-03-06 DIAGNOSIS — E78.00 PURE HYPERCHOLESTEROLEMIA: ICD-10-CM

## 2025-03-06 DIAGNOSIS — M48.062 LUMBAR STENOSIS WITH NEUROGENIC CLAUDICATION: Primary | ICD-10-CM

## 2025-03-06 DIAGNOSIS — M85.88 OSTEOPENIA OF LUMBAR SPINE: ICD-10-CM

## 2025-03-06 DIAGNOSIS — I10 ESSENTIAL HYPERTENSION: ICD-10-CM

## 2025-03-06 DIAGNOSIS — R35.1 BENIGN PROSTATIC HYPERPLASIA WITH NOCTURIA: ICD-10-CM

## 2025-03-06 DIAGNOSIS — E55.9 HYPOVITAMINOSIS D: ICD-10-CM

## 2025-03-06 DIAGNOSIS — Z12.11 COLON CANCER SCREENING: ICD-10-CM

## 2025-03-06 DIAGNOSIS — R35.1 NOCTURIA: ICD-10-CM

## 2025-03-06 DIAGNOSIS — G62.9 POLYNEUROPATHY: ICD-10-CM

## 2025-03-06 DIAGNOSIS — K21.9 GASTROESOPHAGEAL REFLUX DISEASE WITHOUT ESOPHAGITIS: ICD-10-CM

## 2025-03-06 DIAGNOSIS — M48.07 SPINAL STENOSIS OF LUMBOSACRAL REGION: ICD-10-CM

## 2025-03-06 DIAGNOSIS — E66.01 SEVERE OBESITY (BMI 35.0-39.9) WITH COMORBIDITY (HCC): ICD-10-CM

## 2025-03-06 DIAGNOSIS — Z91.81 RISK FOR FALLS: ICD-10-CM

## 2025-03-06 DIAGNOSIS — Z79.4 TYPE 2 DIABETES MELLITUS WITH DIABETIC POLYNEUROPATHY, WITH LONG-TERM CURRENT USE OF INSULIN (HCC): ICD-10-CM

## 2025-03-06 LAB — HEMOGLOBIN A1C: 7 % (ref 4.3–5.6)

## 2025-03-06 PROCEDURE — 1160F RVW MEDS BY RX/DR IN RCRD: CPT | Performed by: PHYSICAL MEDICINE & REHABILITATION

## 2025-03-06 PROCEDURE — 3008F BODY MASS INDEX DOCD: CPT | Performed by: PHYSICAL MEDICINE & REHABILITATION

## 2025-03-06 PROCEDURE — 1159F MED LIST DOCD IN RCRD: CPT | Performed by: PHYSICAL MEDICINE & REHABILITATION

## 2025-03-06 PROCEDURE — 99214 OFFICE O/P EST MOD 30 MIN: CPT | Performed by: PHYSICAL MEDICINE & REHABILITATION

## 2025-03-06 RX ORDER — PREGABALIN 150 MG/1
150 CAPSULE ORAL EVERY EVENING
Qty: 90 CAPSULE | Refills: 0 | Status: SHIPPED | OUTPATIENT
Start: 2025-03-06

## 2025-03-06 RX ORDER — AMITRIPTYLINE HYDROCHLORIDE 50 MG/1
50 TABLET ORAL NIGHTLY
Qty: 90 TABLET | Refills: 2 | Status: SHIPPED | OUTPATIENT
Start: 2025-03-06

## 2025-03-06 RX ORDER — PREDNISONE 20 MG/1
20 TABLET ORAL DAILY
Qty: 6 TABLET | Refills: 0 | Status: SHIPPED | OUTPATIENT
Start: 2025-03-06

## 2025-03-06 NOTE — PROGRESS NOTES
Subjective:   Beto Andrea is a 69 year old male who presents for a MA AHA (Medicare Advantage Annual Health Assessment) and Subsequent Annual Wellness visit (Pt already had Initial Annual Wellness) and scheduled follow up of multiple significant but stable problems.       Beto Andrea is a 69 year old male is here for routine periodic Medicare health screening and examination.  His last physical exam was last year.  His last ECG was many years ago and was normal. His last diabetes screening test was 1 years ago and was abnormal: Hx diabetes.   His last cholesterol test was 1 year ago and was abnormal: Hx hyperlipidemia.  Last dentist visit was 12 months ago. He is current with his Td immunization.  Patient last colonoscopy was 5 year ago, normal. None smoker.      Patient Beto Andrea is a 69 year old male is here to be evaluated for type 2 diabetes.  Specifically, male has type 2, insulin none requiring diabetes. Compliance with treatment has been good.  Patient's diabetes was first diagnosed 15 years ago.  Patient follows a 2000 calorie ADA diet.  Patient report experiencing the following diabetes related symptoms; Positive for polyuria, Positive for polydipsia, Positive for blurred vision.  Depression symptoms include none.  Tobacco screen: none  smoker.  Current meds include :  oral hypoglycemic include: Xigduo Xr 5-1000mg, Trulicity 1.5 weekly  insulin/injectable : Trulciity weekly, increased Humalog 75/25 44u Q am, 42u Q pm  Hypoglycemia severity is not applicable.he reports home blood glucose readings have been 128 (#s) and believes  having fair glucose control.  Most recent lab results include glycohemoglobin 8.5%, microalbuminuria have been ?.  In regard to preventative care, his last ophthalmology exam was in >2 months ago.  Opthalmic evaluation have shown no pathology.  Concurrent relative health problems include HTN, hyperlipidemia.     In addition patient has a long history of spinal  stenosis for which she is undergoing laminectomy.  Last week patient had an episode where he could not walk and almost fell while he was at the grocery store.  Complaining of bilateral lower extremities weakness left worse than right.  Also continues to have on and off lumbar pain as well.  His last MRI was 5 years ago.     History/Other:   Fall Risk Assessment:   He has been screened for Falls and is High Risk. Fall Prevention information provided to patient in After Visit Summary.    Do you feel unsteady when standing or walking?: Yes  Do you worry about falling?: Yes  Have you fallen in the past year?: No     Cognitive Assessment:   He had a completely normal cognitive assessment - see flowsheet entries       Functional Ability/Status:   Beto Andrea has some abnormal functions as listed below:  He has Dressing and/or Bathing issues based on screening of functional status.  Difficulty dressing or bathing?: Yes  Bathing or Showering: Able without help  Dressing: Able without help  He has difficulties Managing Money/Bills based on screening of functional status.He has Walking problems based on screening of functional status.       Depression Screening (PHQ):  PHQ-2 SCORE: 1  , done 3/6/2025   Feeling down, depressed, or hopeless: 1    Last [facility] Suicide Screening on 3/6/2025 was No Risk.          Advanced Directives:   He does NOT have a Living Will. [Do you have a living will?: No]  He does NOT have a Power of  for Health Care. [Do you have a healthcare power of ?: No]  Discussed Advance Care Planning with patient (and family/surrogate if present). Standard forms made available to patient in After Visit Summary.      Patient Active Problem List   Diagnosis    Uncontrolled type 2 diabetes mellitus with hyperglycemia (HCC)    Severe obesity (BMI 35.0-39.9) with comorbidity (HCC)    Smokers' cough (HCC)    Bilateral back pain    Sciatica associated with disorder of lumbar spine    Numbness and  tingling in right hand     Allergies:  He has No Known Allergies.    Current Medications:  Outpatient Medications Marked as Taking for the 3/6/25 encounter (Office Visit) with Austin Clarke MD   Medication Sig    tamsulosin 0.4 MG Oral Cap TAKE 1 CAPSULE BY MOUTH DAILY    Insulin Lispro Prot & Lispro (HUMALOG MIX 75/25 KWIKPEN) (75-25) 100 UNIT/ML SC SUPN INJECT 54 UNITS SUBCUTANEOUSLY  EVERY MORNING AND 45 UNITS EVERY EVENING    pregabalin 150 MG Oral Cap Take 1 capsule (150 mg total) by mouth every evening.    amitriptyline 50 MG Oral Tab Take 1 tablet (50 mg total) by mouth nightly.    atorvastatin 40 MG Oral Tab Take 1 tablet (40 mg total) by mouth nightly.    Dapagliflozin Pro-metFORMIN ER (XIGDUO XR) 5-1000 MG Oral Tablet 24 Hr Take 1 tablet by mouth 2 (two) times daily.    Insulin Pen Needle (TECHLITE PEN NEEDLES) 32G X 6 MM Does not apply Misc Use as directed 4 times daily    losartan 25 MG Oral Tab Take 1 tablet (25 mg total) by mouth daily.    metoprolol succinate ER 25 MG Oral Tablet 24 Hr Take 1 tablet (25 mg total) by mouth daily.    TRULICITY 1.5 MG/0.5ML Subcutaneous Solution Pen-injector Inject 1.5 mg into the skin once a week.    Accu-Chek Softclix Lancets Does not apply Misc 1 Lancet by Finger stick route in the morning and 1 Lancet before bedtime.    Glucose Blood (ACCU-CHEK DAXA PLUS) In Vitro Strip TEST TWICE DAILY    Alcohol Swabs (ALCOHOL PADS) 70 % Does not apply Pads For use prior to checking sugar or giving insulin    aspirin EC 81 MG Oral Tab EC Take 1 tablet (81 mg total) by mouth daily.       Medical History:  He  has a past medical history of Cataracts, bilateral, Diabetes (HCC), Essential hypertension, High blood pressure, and Hyperlipidemia.  Surgical History:  He  has a past surgical history that includes laminectomy (1991); ligmt revision,knee,intra-artic (Left); and colonoscopy (N/A, 9/8/2020).   Family History:  His family history includes Diabetes in his mother and  sister.  Social History:  He  reports that he quit smoking about 31 years ago. His smoking use included cigarettes. He started smoking about 51 years ago. He has never been exposed to tobacco smoke. He has never used smokeless tobacco. He reports current alcohol use. He reports that he does not use drugs.    Tobacco:  He smoked tobacco in the past but quit greater than 12 months ago.  Social History     Tobacco Use   Smoking Status Former    Current packs/day: 0.00    Types: Cigarettes    Start date: 6/10/1973    Quit date: 6/10/1993    Years since quittin.7    Passive exposure: Never   Smokeless Tobacco Never        CAGE Alcohol Screen:   CAGE screening score of 0 on 3/6/2025, showing low risk of alcohol abuse.      Patient Care Team:  Austin Clarke MD as PCP - General (Family Medicine)  Rojelio Mercedes DO (Physical Medicine)  Anselmo Ghosh, Eldon Baez MD (UROLOGY)    Review of Systems   Constitutional:  Negative for appetite change, fatigue and fever.   HENT:  Negative for hearing loss and nosebleeds.    Eyes:  Negative for pain and visual disturbance.   Respiratory:  Negative for apnea and shortness of breath.    Cardiovascular:  Negative for chest pain, palpitations and leg swelling.   Gastrointestinal:  Negative for abdominal pain, blood in stool, constipation, diarrhea, nausea and vomiting.   Endocrine: Negative for polydipsia and polyuria.   Genitourinary:  Negative for decreased urine volume, frequency and hematuria.        No nocturia   Musculoskeletal:  Positive for arthralgias and back pain.   Skin:  Negative for rash.   Neurological:  Positive for weakness (legs) and numbness (legs). Negative for dizziness, syncope and headaches.   Psychiatric/Behavioral:  Negative for dysphoric mood and sleep disturbance.           Objective:   Physical Exam  Vitals reviewed.   Constitutional:       Appearance: Normal appearance. He is well-developed.   HENT:      Head: Normocephalic.      Right Ear: Tympanic  membrane normal.      Left Ear: Tympanic membrane normal.      Nose: Nose normal.      Mouth/Throat:      Pharynx: Oropharynx is clear.   Eyes:      General: No scleral icterus.     Conjunctiva/sclera: Conjunctivae normal.   Cardiovascular:      Rate and Rhythm: Normal rate and regular rhythm.      Heart sounds: Normal heart sounds.   Pulmonary:      Effort: Pulmonary effort is normal.      Breath sounds: Normal breath sounds.   Abdominal:      General: Bowel sounds are normal.      Palpations: Abdomen is soft.      Tenderness: There is no abdominal tenderness.   Musculoskeletal:      Cervical back: Neck supple.      Comments: Normal SLR bilaterally    Lymphadenopathy:      Comments: LEs no edema   Skin:     Findings: No rash.   Psychiatric:         Mood and Affect: Mood normal.         /62 (BP Location: Right arm, Patient Position: Sitting, Cuff Size: adult)   Pulse 66   Temp 97 °F (36.1 °C)   Ht 5' 6\" (1.676 m)   Wt 261 lb 12.8 oz (118.8 kg)   BMI 42.26 kg/m²  Estimated body mass index is 42.26 kg/m² as calculated from the following:    Height as of this encounter: 5' 6\" (1.676 m).    Weight as of this encounter: 261 lb 12.8 oz (118.8 kg).    Medicare Hearing Assessment:   Hearing Screening    Screening Method: Questionnaire  I have a problem hearing over the telephone: No I have trouble following the conversations when two or more people are talking at the same time: No   I have trouble understanding things on the TV: No I have to strain to understand conversations: No   I have to worry about missing the telephone ring or doorbell: No I have trouble hearing conversations in a noisy background such as a crowded room or restaurant: No   I get confused about where sounds come from: No I misunderstand some words in a sentence and need to ask people to repeat themselves: No   I especially have trouble understanding the speech of women and children: No I have trouble understanding the speaker in a large room  such as at a meeting or place of Mosque: No   Many people I talk to seem to mumble (or don't speak clearly): No People get annoyed because I misunderstand what they say: No   I misunderstand what others are saying and make inappropriate responses: No I avoid social activities because I cannot hear well and fear I will reply improperly: No   Family members and friends have told me they think I may have hearing loss: No             Visual Acuity:   Right Eye Visual Acuity: Uncorrected Right Eye Chart Acuity: 20/20   Left Eye Visual Acuity: Uncorrected Left Eye Chart Acuity: 20/13   Both Eyes Visual Acuity: Uncorrected Both Eyes Chart Acuity: 20/13   Able To Tolerate Visual Acuity: Yes        Assessment & Plan:   Beto Andrea is a 69 year old male who presents for a Medicare Assessment.     1. Medicare annual wellness visit, subsequent (Primary)  2. Severe obesity (BMI 35.0-39.9) with comorbidity (HCC)  3. Type 2 diabetes mellitus with diabetic polyneuropathy, with long-term current use of insulin (HCC)  -     Comp Metabolic Panel (14); Future; Expected date: 03/06/2025  -     Lipid Panel; Future; Expected date: 03/06/2025  -     TSH W Reflex To Free T4; Future; Expected date: 03/06/2025  -     Urinalysis with Culture Reflex; Future; Expected date: 03/06/2025  -     POC Hemoglobin A1C  -     OPHTHALMOLOGY - INTERNAL  -     Microalb/Creat Ratio, Random Urine; Future; Expected date: 03/06/2025  4. Essential hypertension  -     CBC With Differential With Platelet; Future; Expected date: 03/06/2025  -     Comp Metabolic Panel (14); Future; Expected date: 03/06/2025  5. Pure hypercholesterolemia  -     Lipid Panel; Future; Expected date: 03/06/2025  6. Primary central sleep apnea  7. Benign prostatic hyperplasia with nocturia  8. Colon cancer screening  9. Gastroesophageal reflux disease without esophagitis  10. Slow transit constipation  11. Nocturia  -     PSA, Total W Reflex To Free  -     Urinalysis with Culture  Reflex; Future; Expected date: 03/06/2025  12. Intermittent claudication  13. Primary osteoarthritis of right knee  14. Osteopenia of lumbar spine  15. Hypovitaminosis D  -     Vitamin D; Future; Expected date: 03/06/2025  16. Risk for falls  17. Spinal stenosis of lumbosacral region  -     MRI SPINE LUMBAR (CPT=72148); Future; Expected date: 03/06/2025  -     Neurosurgery Referral - In Network    1. Medicare annual wellness visit, subsequent      CPE PLAN:    LABS / TEST & ORDERS for today's visit :Blood test(s) ordered today for send out to [facility] lab:  Orders Placed This Encounter   Procedures    CBC With Differential With Platelet    Comp Metabolic Panel (14)    Lipid Panel    PSA, Total W Reflex To Free    TSH W Reflex To Free T4    Urinalysis with Culture Reflex    Vitamin D    POC Glycohemoglobin [56612]    Microalb/Creat Ratio, Random Urine   Referrals: OPHTHALMOLOGY - INTERNAL  NEUROSURGERY - INTERNAL  MRI SPINE LUMBAR (CPT=72148)  In-House; Urine dip.  Test/Procedures done today include: EKG.    IMMUNIZATIONS: none given today.    RECOMMENDATIONS given include: ANTICIPATORY GUIDANCE  topics covered today include: safety (i.e. seat belts, helmets, sunscreen, protective sports gear ), nutrition (i.e. healthy meals and snacks (i.e. avoid junk food and high-carbohydrate foods); athletic conditioning, fluids; low fat milk, limit to less than 20 oz. a day; dental care ), and Healthy habits& Social competence & Responsibilities: Recommendations on physical activity; exercise daily or at least 3 times a week for 30-60 minutes doing cardiovascular exercise. Encourage to maintain the best physical and dental hygiene possible.  FOLLOW-UP: Schedule a follow-up visit in 12 months.     2. Severe obesity (BMI 35.0-39.9) with comorbidity (HCC)  Weight loss plan    3. Type 2 diabetes mellitus with diabetic polyneuropathy, with long-term current use of insulin (HCC)  Stable  Doing well  CPM  Follow up [facility]  Lab:    - Comp Metabolic Panel (14); Future  - Lipid Panel; Future  - TSH W Reflex To Free T4; Future  - Urinalysis with Culture Reflex; Future  - POC Glycohemoglobin [61000]  - OPHTHALMOLOGY - INTERNAL  - Microalb/Creat Ratio, Random Urine; Future    4. Essential hypertension  Doing well  CPM  Follow up [facility]  Lab:   - CBC With Differential With Platelet; Future  - Comp Metabolic Panel (14); Future    5. Pure hypercholesterolemia  Doing well  CPM  Follow up [facility]  Lab:  Lipid Panel; Future    6. Primary central sleep apnea  Continue using CPAP     7. Benign prostatic hyperplasia with nocturia  Doing well  CPM  Follow up [facility]    8. Colon cancer screening  S/p colonoscopy    9. Gastroesophageal reflux disease without esophagitis  Doing well  CPM  Follow up [facility]    10. Slow transit constipation  Doing well  CPM    11. Nocturia  Lab:   - PSA, Total W Reflex To Free  - Urinalysis with Culture Reflex; Future    12. Intermittent claudication  Increase walking exercise once clear by neurosurgeon    13. Primary osteoarthritis of right knee  Doing well  CPM  Follow up K[facility]    14. Osteopenia of lumbar spine  Re scope in 1 year    15. Hypovitaminosis D  Lab:  Vitamin D; Future    16. Risk for falls  High risk, precautions given    17. Spinal stenosis of lumbosacral region   Referral:   - MRI SPINE LUMBAR (CPT=72148); Future  - Neurosurgery Referral - In Network     The patient indicates understanding of these issues and agrees to the plan.  Consult ordered.  Further testing ordered.  Imaging studies ordered.  Lab work ordered.  Reinforced healthy diet, lifestyle, and exercise.      Return in about 6 weeks (around 4/17/2025).     RADHA THORNTON MD, 3/6/2025     Supplementary Documentation:   General Health:  In the past six months, have you lost more than 10 pounds without trying?: 2 - No  Has your appetite been poor?: No  Type of Diet: Balanced  How does the patient maintain a good energy level?: Daily Walks, Stretching  How would you  describe your daily physical activity?: Moderate  How would you describe your current health state?: Fair  How do you maintain positive mental well-being?: Visiting Family, Social Interaction  On a scale of 0 to 10, with 0 being no pain and 10 being severe pain, what is your pain level?: 7 - (Severe)  In the past six months, have you experienced urine leakage?: 1-Yes  At any time do you feel concerned for the safety/well-being of yourself and/or your children, in your home or elsewhere?: No  Have you had any immunizations at another office such as Influenza, Hepatitis B, Tetanus, or Pneumococcal?: No    Health Maintenance   Topic Date Due    COVID-19 Vaccine (4 - 2024-25 season) 09/01/2024    Influenza Vaccine (1) 10/01/2024    Diabetes Care Dilated Eye Exam  10/06/2024    Diabetes Care A1C  11/05/2024    Annual Well Visit  01/01/2025    Annual Depression Screening  01/01/2025    Fall Risk Screening (Annual)  01/01/2025    Diabetes Care: Foot Exam (Annual)  01/01/2025    Diabetes Care: Microalb/Creat Ratio (Annual)  01/01/2025    Diabetes Care: GFR  08/05/2025    PSA  03/08/2026    Colorectal Cancer Screening  09/08/2030    Pneumococcal Vaccine: 50+ Years  Completed    Zoster Vaccines  Completed    Meningococcal B Vaccine  Aged Out

## 2025-03-06 NOTE — PATIENT INSTRUCTIONS
Schedule the MRI of the lower back. If you have not heard from anyone that the MRI has been approved within 5 days of the appointment contact Dr. Clarke' office and ask if the MRI has been approved.     Resume the amitritpyline. If not seeing improvement in 3 days start the prednisone.     Come see me after the MRI is done.

## 2025-03-06 NOTE — PROGRESS NOTES
Progress note    C/C:   Chief Complaint   Patient presents with    Follow - Up     LOV 7/9/24 pt is here for a follow up on back pain. States pain radiates into leg.  Reports n/t . Takes gabapentin. No current physical therapy.Reports pain to be stabbing. Pain 9/10      HPI: 69-year-old male presents for follow-up.  He recently arrived from [place].  Went to [facility] with his daughter, and upon being in [facility] for about 15 minutes legs began to feel weak.  Due to weakness they decided to cut the shopping trip short.  Upon walking back towards the car in the parking lot and his legs began he began stumbling, and nearly tripped over a speed bump.  The legs have continued to feel weak upon standing and walking.  Recently saw his primary care doctor earlier today, who ordered an MRI of the lumbar spine and neurosurgery referral.    He has been having increased bilateral low back, buttock and leg pain down the posterior aspects of the legs to the lateral aspect of the ankles bilaterally, 1 leg not worse than the other.  There is no pain in the back or legs until he attempts to stand and walk.  The longer he stands and walks, the more painful the legs become, and the more weak the legs feel. No incontinence.    Able to walk to the clinic at [place] today, though slow.    Pertinent allergies: Allergies[1]     Physical exam:  Ht 66\"   Wt 261 lb (118.4 kg)   BMI 42.13 kg/m²      Lumbar spine exam:    Able to stand and ascend to examination table with minimal difficulty  4+/5 left EHL, ankle eversion. Otherwise 5/5 HF, KE, ankle inversion, toe flexors, PF bilaterally  SILT b/l LE  1/4 quad, gastrocs reflexes b/l  SLR - b/l    Imaging: No new imaging to review    Assessment and plan  Left lumbar radiculopathy, L5  Lumbar stenosis with neurogenic claudication  DM  HTN    Agree with MRI lumbar spine w/o contrast; symptoms are suggestive of symptomatic lumbar stenosis with neurogenic claudication.  Upon further discussion I  previously had him on amitriptyline, and he ran out while in [place].  Has been a few months since he has taken the medicine.  I recommend that he resume the amitriptyline 50 mg daily, continue pregabalin 150 mg at nighttime.  If he is no better with his ability to tolerate standing and walking after 3 days of resuming the amitriptyline I prescribed him a 6 day course of prednisone 20mg qDaily. Instructed that this may raise his glucose levels. He might need physical therapy for neutral to flexion lumbar stabilization, though might be better to get the MRI first given the significant reported episode of weakness.     Rojelio Mercedes DO  Physical Medicine and Rehabilitation  [facility]       [1] No Known Allergies

## 2025-03-18 ENCOUNTER — HOSPITAL ENCOUNTER (OUTPATIENT)
Dept: MRI IMAGING | Facility: HOSPITAL | Age: 70
Discharge: HOME | End: 2025-03-18
Attending: FAMILY MEDICINE
Payer: MEDICARE

## 2025-03-18 DIAGNOSIS — M48.07 SPINAL STENOSIS OF LUMBOSACRAL REGION: ICD-10-CM

## 2025-03-18 PROCEDURE — 72148 MRI LUMBAR SPINE W/O DYE: CPT | Performed by: FAMILY MEDICINE

## 2025-03-21 ENCOUNTER — HOSPITAL ENCOUNTER (OUTPATIENT)
Dept: GENERAL RADIOLOGY | Facility: HOSPITAL | Age: 70
Discharge: HOME | End: 2025-03-21
Attending: STUDENT IN AN ORGANIZED HEALTH CARE EDUCATION/TRAINING PROGRAM
Payer: MEDICARE

## 2025-03-21 ENCOUNTER — OFFICE VISIT (OUTPATIENT)
Dept: SURGERY | Facility: CLINIC | Age: 70
End: 2025-03-21
Payer: COMMERCIAL

## 2025-03-21 VITALS
BODY MASS INDEX: 25.39 KG/M2 | HEART RATE: 68 BPM | OXYGEN SATURATION: 98 % | HEIGHT: 66 IN | WEIGHT: 158 LBS | SYSTOLIC BLOOD PRESSURE: 131 MMHG | DIASTOLIC BLOOD PRESSURE: 71 MMHG

## 2025-03-21 DIAGNOSIS — M43.16 SPONDYLOLISTHESIS OF LUMBAR REGION: ICD-10-CM

## 2025-03-21 DIAGNOSIS — M47.816 FACET ARTHROPATHY, LUMBAR: ICD-10-CM

## 2025-03-21 DIAGNOSIS — M54.16 LUMBAR RADICULOPATHY: ICD-10-CM

## 2025-03-21 DIAGNOSIS — R29.898 WEAKNESS OF BOTH LOWER EXTREMITIES: ICD-10-CM

## 2025-03-21 DIAGNOSIS — M48.061 LUMBAR FORAMINAL STENOSIS: ICD-10-CM

## 2025-03-21 DIAGNOSIS — G83.4 CAUDA EQUINA COMPRESSION (HCC): ICD-10-CM

## 2025-03-21 DIAGNOSIS — M47.812 CERVICAL SPONDYLOSIS: Primary | ICD-10-CM

## 2025-03-21 DIAGNOSIS — M54.9 MUSCULOSKELETAL BACK PAIN: ICD-10-CM

## 2025-03-21 DIAGNOSIS — M47.816 LUMBAR SPONDYLOSIS: ICD-10-CM

## 2025-03-21 DIAGNOSIS — M48.062 LUMBAR STENOSIS WITH NEUROGENIC CLAUDICATION: ICD-10-CM

## 2025-03-21 DIAGNOSIS — M51.362 DEGENERATION OF INTERVERTEBRAL DISC OF LUMBAR REGION WITH DISCOGENIC BACK PAIN AND LOWER EXTREMITY PAIN: ICD-10-CM

## 2025-03-21 DIAGNOSIS — M48.061 STENOSIS OF LATERAL RECESS OF LUMBAR SPINE: ICD-10-CM

## 2025-03-21 PROCEDURE — 3078F DIAST BP <80 MM HG: CPT | Performed by: STUDENT IN AN ORGANIZED HEALTH CARE EDUCATION/TRAINING PROGRAM

## 2025-03-21 PROCEDURE — 99205 OFFICE O/P NEW HI 60 MIN: CPT | Performed by: STUDENT IN AN ORGANIZED HEALTH CARE EDUCATION/TRAINING PROGRAM

## 2025-03-21 PROCEDURE — 3008F BODY MASS INDEX DOCD: CPT | Performed by: STUDENT IN AN ORGANIZED HEALTH CARE EDUCATION/TRAINING PROGRAM

## 2025-03-21 PROCEDURE — 77073 BONE LENGTH STUDIES: CPT | Performed by: STUDENT IN AN ORGANIZED HEALTH CARE EDUCATION/TRAINING PROGRAM

## 2025-03-21 PROCEDURE — 1160F RVW MEDS BY RX/DR IN RCRD: CPT | Performed by: STUDENT IN AN ORGANIZED HEALTH CARE EDUCATION/TRAINING PROGRAM

## 2025-03-21 PROCEDURE — 72082 X-RAY EXAM ENTIRE SPI 2/3 VW: CPT | Performed by: STUDENT IN AN ORGANIZED HEALTH CARE EDUCATION/TRAINING PROGRAM

## 2025-03-21 PROCEDURE — 1159F MED LIST DOCD IN RCRD: CPT | Performed by: STUDENT IN AN ORGANIZED HEALTH CARE EDUCATION/TRAINING PROGRAM

## 2025-03-21 PROCEDURE — 3075F SYST BP GE 130 - 139MM HG: CPT | Performed by: STUDENT IN AN ORGANIZED HEALTH CARE EDUCATION/TRAINING PROGRAM

## 2025-03-21 PROCEDURE — 72110 X-RAY EXAM L-2 SPINE 4/>VWS: CPT | Performed by: STUDENT IN AN ORGANIZED HEALTH CARE EDUCATION/TRAINING PROGRAM

## 2025-03-21 PROCEDURE — 1125F AMNT PAIN NOTED PAIN PRSNT: CPT | Performed by: STUDENT IN AN ORGANIZED HEALTH CARE EDUCATION/TRAINING PROGRAM

## 2025-03-21 RX ORDER — PEAK FLOW METER
EACH MISCELLANEOUS
COMMUNITY
Start: 2025-03-13

## 2025-03-21 RX ORDER — ALBUTEROL SULFATE 0.83 MG/ML
3 SOLUTION RESPIRATORY (INHALATION) EVERY 6 HOURS PRN
COMMUNITY
Start: 2025-03-13

## 2025-03-21 NOTE — PROGRESS NOTES
The following individual(s) verbally consented to be recorded using ambient AI listening technology and understand that they can each withdraw their consent to this listening technology at any point by asking the clinician to turn off or pause the recording:    Patient name: Beto Andrea  Additional names:  Paige Andrea, Daughter      New patient:  Reason for visit: New patient presents to clinic complaining of lower back pain bilateral buttock and leg pain. Patient has numbness and tingling in legs and stops at the ankle. The pain radiates anterior side of the legs. Patient is diabetic. Patient cannot  with hands. He has hx of carpal tunnel surgery in both hands.     Dx: Spinal stenosis of lumbosacral region   Referred: Dr. Clarke     Estimated time of onset:  Years     Numeric Rating Scale:      Pain at Present:  8/10                                                                                                                       Distribution of Pain:   Bilateral     Past Treatments for Current Pain Condition:    Surgery: Lumbar Laminectomy in 1991 and removed scarred tissue in 1993 at [facility].   Physical Therapy: No   Injections: Last year had lumbar injections with Dr. Mercedes.   Medications: Pregabalin and Prednisone provided some relief.    Prior diagnostic testing related to this condition:  MRI Lumbar Spine in chart.

## 2025-03-21 NOTE — H&P
[facility]  Neurological Surgery New Patient Clinic Note    Beto Andrea  10/27/1955  [id number]  PCP: AUSTIN THORNTON MD  Referring Provider: Austin Thornton MD    REASON FOR VISIT:  Low back pain with radiation    HISTORY OF PRESENT ILLNESS 3/21/2025:  Beto Andrea is a(n) 69 year old male presents for neurosurgical evaluation of progressive lumbar pain and bilateral lower extremity symptoms. He reports longstanding low back pain dating back to a lumbar decompression surgery performed in the early 1990s. His symptoms have significantly worsened over the past 6 months to a year, particularly after a recent period of immobility and functional decline while in [place]. He describes bilateral posterior leg pain with associated weakness and difficulty ambulating. He also reports progressive difficulty with hand function, including impaired ability to flex the fingers and chronic paresthesia in the digits, raising concern for possible cervical myelopathy.    The patient denies any history of recent trauma, overt bowel or bladder dysfunction, or overt upper motor neuron signs. He acknowledges chronic medical conditions including diabetes and hypertension. He takes daily aspirin but is not on any anticoagulants. Notably, he has a remote history of lumbar surgery in the early 1990s (presumed L4-5 laminectomy) but was never fully asymptomatic thereafter. He also has undergone surgery for carpal tunnel syndrome but notes persistent limitations in hand function.    Given his constellation of symptoms, the clinical suspicion is high for significant progression of lumbar spinal stenosis at the prior surgical level as well as possible cervical stenosis or myelopathy, and a comprehensive workup has been initiated.    PAST MEDICAL HISTORY:  Past Medical History:    Cataracts, bilateral    Diabetes (HCC)    Essential hypertension    High blood pressure    Hyperlipidemia     PAST  SURGICAL HISTORY:  Past Surgical History:   Procedure Laterality Date    Colonoscopy N/A 9/8/2020    Procedure: COLONOSCOPY;  Surgeon: Zbigniew Hooker MD;  Location: [facility] [place] ENDOSCOPY    Laminectomy  1991    Ligmt revision,knee,intra-artic Left      FAMILY HISTORY:  family history includes Diabetes in his mother and sister.    SOCIAL HISTORY:   reports that he quit smoking about 31 years ago. His smoking use included cigarettes. He started smoking about 51 years ago. He has never been exposed to tobacco smoke. He has never used smokeless tobacco. He reports current alcohol use. He reports that he does not use drugs.    ALLERGIES:  Allergies[1]    MEDICATIONS:  Medications Ordered Prior to Encounter[2]    REVIEW OF SYSTEMS:  All other systems were reviewed and were negative except for those previously mentioned in the HPI    PHYSICAL EXAMINATION:  General: No acute distress.  Respiratory: Non-labored respirations bilaterally. No audible wheezing  Cardiovascular: Extremities warm and well-perfused.  Abdomen: Soft, nontender, nondistended.   Musculoskeletal: Moves all extremities well, symmetrically.  Extremities: No edema.    NEUROLOGIC EXAMINATION:  Mental status: Alert and oriented x 3  Speech: Clear, fluent  Cranial nerves: PERRLA, EOMI, face symmetric, with normal strength and sensation, tongue and palate midline, SCM 5/5 bilaterally  Motor:     RIGHT  Delt 5/5   Bic 5/5  Tri 5/5   HI 5/5    4/5  IP 5/5   Quad 5/5   Ham 5/5   AT 5/5   EHL 5/5 Kenzie 5/5     LEFT    Delt 5/5   Bic 5/5  Tri 5/5   HI 5/5    4/5  IP 5/5   Quad 5/5   Ham 5/5   AT 5/5   EHL 5/5 Kenzie 5/5   No pronator drift  Tone: Normal  Atrophy/Fasciculations: None  Sensation: Normal to light touch, symmetric, no neglect  Cerebellar: Normal finger nose finger  Gait: Antalgic, wide based.      Reflexes: 1+ throughout, symmetric, no Eladio's    IMAGING:  MR lumbar spine 5/21/2020: Chronic anterolisthesis of L4 on L5 with multilevel  degenerative changes and severe stenosis at L4-L5. Right facet arthropathy and left facet defect contributed to critical right foraminal narrowing.    XR lumbar spine flexion/extension 10/13/2020: Grade 1 anterolisthesis of L4 on L5, increasing from 7 mm in neutral to 10 mm on flexion.     DEXA scan 3/6/2024: Findings consistent with osteopenia at the left femoral neck (T-score -1.6) and lumbar spine (T-score -1.1). The patient’s 10-year fracture risk remains low (3.3% for major osteoporotic fracture, 0.6% for hip fracture).  MR lumbar spine 3/18/2025: There is grade 1 anterolisthesis of L4 on L5 with associated left pars interarticularis defect. Severe central canal stenosis, bilateral lateral recess, and severe bilateral foraminal stenosis at L4-L5. These findings are markedly progressed compared to prior studies from 2020. Compression is evident on the bilateral exiting L4 nerve roots, bilateral traversing L5 roots, and cauda equina.      ASSESSMENT:  Mr. Andrea is a 69-year-old male with a history of prior lumbar surgery and longstanding low back pain, now presenting with clinically and radiographically progressive lumbar spinal stenosis at L4-5. MRI from 3/18/2025 demonstrates high-grade central and foraminal stenosis with listhesis at L4-5 and bilateral nerve root compression. His neurologic exam shows preserved motor strength and reflexes but subjective bilateral leg weakness and functional decline. His prior laminectomy likely led to progressive instability, as supported by dynamic radiographs and facet hypertrophy.    There is concern for cervical stenosis as a contributing factor to his upper extremity symptoms, which include difficulty flexing the fingers and chronic paresthesias. Limited available imaging ( on lumbar MRI) of the cervical spine suggests possible canal narrowing, though definitive assessment requires a dedicated cervical MRI. DEXA confirms osteopenia, which must be factored into  surgical planning due to implications for hardware fixation.    Given his clinical deterioration, imaging findings, and failure of conservative measures, surgical intervention is likely indicated, pending preoperative workup to assess cervical pathology and bone quality.    PLAN:  - Obtain MRI cervical spine without contrast to evaluate for concurrent cervical stenosis or myelopathy.  - Obtain full-length scoliosis radiograph in standing neutral posture to assess global alignment and plan reconstructive strategy.  - Obtain CT lumbar spine to evaluate bone quality and assess the integrity of posterior elements, facet joints, and pedicles for instrumentation planning.  - Continue aspirin 81 mg daily; no additional anticoagulants at this time.  - Educated patient regarding diagnosis, natural history, and the rationale for comprehensive imaging to guide surgical decision-making.  - Once imaging is obtained, return to clinic for definitive discussion of surgical options. If cervical spine is clear and bone quality permits, consider posterior instrumented fusion with decompression at L4-5, possibly with interbody support depending on sagittal alignment needs.  - Refer to primary care or cardiology for perioperative cardiac clearance, given history of remote suspected silent infarct.  - Coordinate with PM&R for continued optimization of functional status and diabetes management preoperatively.  - Schedule follow-up after all imaging is complete to finalize surgical plan.  - Counseled on risks of surgery including infection, nonunion, neurologic deterioration, instrumentation failure, and the need for revision.    Norris Zuniga MD  Neurological Surgery    [address], [address] [place] [id number]  [phone]  Pager [phone]  3/21/2025 10:58 AM      This note was created using a voice-recognition transcribing system. Incorrect words or phrases may have been  missed during proofreading. Please interpret accordingly.    Total Time    New Patient Total Time       60  minutes.      Activities       Preparing to see the patient (chart/tests/imaging review).       Obtaining and/or reviewing separately obtained history.       Performing a medically appropriate examination and/or evaluation.       Counseling and educating the patient/family/caregiver.       Ordering medications, tests, or procedures.       Referring and communicating with other health care professionals (when not separately reported).       Documenting clincal information in the electronic or other health record.       Independently interpreting results (not separately reported).    Communicating results to the patient/family/caregiver.    Care coordination (not separately reported).         [1] No Known Allergies  [2]   Current Outpatient Medications on File Prior to Visit   Medication Sig Dispense Refill    amitriptyline 50 MG Oral Tab Take 1 tablet (50 mg total) by mouth nightly. 90 tablet 2    pregabalin 150 MG Oral Cap Take 1 capsule (150 mg total) by mouth every evening. 90 capsule 0    predniSONE 20 MG Oral Tab Take 1 tablet (20 mg total) by mouth daily. 6 tablet 0    tamsulosin 0.4 MG Oral Cap TAKE 1 CAPSULE BY MOUTH DAILY 100 capsule 3    Insulin Lispro Prot & Lispro (HUMALOG MIX 75/25 KWIKPEN) (75-25) 100 UNIT/ML SC SUPN INJECT 54 UNITS SUBCUTANEOUSLY  EVERY MORNING AND 45 UNITS EVERY EVENING 60 mL 4    atorvastatin 40 MG Oral Tab Take 1 tablet (40 mg total) by mouth nightly. 90 tablet 3    Dapagliflozin Pro-metFORMIN ER (XIGDUO XR) 5-1000 MG Oral Tablet 24 Hr Take 1 tablet by mouth 2 (two) times daily. 200 tablet 3    Insulin Pen Needle (TECHLITE PEN NEEDLES) 32G X 6 MM Does not apply Misc Use as directed 4 times daily 400 each 3    losartan 25 MG Oral Tab Take 1 tablet (25 mg total) by mouth daily. 90 tablet 3    metoprolol succinate ER 25 MG Oral Tablet 24 Hr Take 1 tablet (25 mg total) by mouth  daily. 100 tablet 3    TRULICITY 1.5 MG/0.5ML Subcutaneous Solution Pen-injector Inject 1.5 mg into the skin once a week. 18 mL 1    hydrocortisone (ANUSOL-HC) 2.5 % External Cream Place 1 Application rectally 2 (two) times daily. (Patient not taking: Reported on 3/6/2025) 28 g 1    Accu-Chek Softclix Lancets Does not apply Misc 1 Lancet by Finger stick route in the morning and 1 Lancet before bedtime. 200 each 3    celecoxib (CELEBREX) 200 MG Oral Cap Take 1 capsule (200 mg total) by mouth daily. (Patient not taking: Reported on 3/6/2025) 90 capsule 1    Glucose Blood (ACCU-CHEK DAXA PLUS) In Vitro Strip TEST TWICE DAILY 200 strip 3    tiZANidine 4 MG Oral Tab Take 1 tablet (4 mg total) by mouth every 6 (six) hours as needed. (Patient not taking: Reported on 3/6/2025) 60 tablet 0    Alcohol Swabs (ALCOHOL PADS) 70 % Does not apply Pads For use prior to checking sugar or giving insulin      aspirin EC 81 MG Oral Tab EC Take 1 tablet (81 mg total) by mouth daily. 100 tablet 2     No current facility-administered medications on file prior to visit.

## 2025-03-28 NOTE — TELEPHONE ENCOUNTER
Please review. Protocol Failed or has No Protocol.    Requested Prescriptions   Pending Prescriptions Disp Refills    ATORVASTATIN 40 MG Oral Tab [Pharmacy Med Name: Atorvastatin Calcium 40 MG Oral Tablet] 100 tablet 2     Sig: TAKE 1 TABLET BY MOUTH NIGHTLY       Cholesterol Medication Protocol Failed - 3/28/2025  1:40 PM        Failed - Lipid panel within past 12 months     Lab Results   Component Value Date    CHOLEST 219 (H) 03/06/2024    TRIG 217 (H) 03/06/2024    HDL 43 03/06/2024     (H) 03/06/2024    VLDL 40 (H) 03/06/2024    NONHDLC 176 (H) 03/06/2024             Passed - ALT < 80     Lab Results   Component Value Date    ALT 19 08/05/2024             Passed - ALT resulted within past year        Passed - In person appointment or virtual visit in the past 12 mos or appointment in next 3 mos     Recent Outpatient Visits              1 week ago [facility]    Office Visit    3 weeks ago Lumbar stenosis with neurogenic claudication    [facility]    Office Visit    3 weeks ago Medicare annual wellness visit, subsequent    [facility], Austin Mariano MD    Office Visit    7 months ago Type 2 diabetes mellitus with diabetic polyneuropathy, with long-term current use of insulin (HCC)    [facility], Austin Mariano MD    Office Visit    8 months ago Class 3 severe obesity due to excess calories with body mass index (BMI) of 40.0 to 44.9 in adult, unspecified whether serious comorbidity present (HCC)    [facility]    [facility] Visit          Future Appointments         Provider Department Appt Notes    Tomorrow [facility] CT RM1 [facility] CT - [place] -    In 1 week Rojelio Mercedes DO [facility]  Lashaun Martinez follow up    In 2 weeks  MR RM3 (3T WIDE) [facility] MRI     In 2 weeks Austin Clarke MD [facility] 6 week f/u                    Passed - Medication is active on med list             Future Appointments         Provider Department Appt Notes    Tomorrow [facility] CT RM1 [facility] CT - [place] -    In [facility]    In 2 weeks  MR RM3 (3T WIDE) [facility] MRI     In 2 weeks Austin Clarke MD [facility] 6 week f/u            Recent Outpatient Visits              1 week [facility]    [facility]    3 weeks ago Lumbar stenosis with neurogenic claudication    [facility]    [facility]    3 weeks ago Medicare annual wellness visit, subsequent    [facility] [address], Austin Mariano MD    Office Visit    7 months ago Type 2 diabetes mellitus with diabetic polyneuropathy, with long-term current use of insulin ([facility]    Office Visit    8 months ago Class 3 severe obesity due to excess calories with body mass index (BMI) of 40.0 to 44.9 in adult, unspecified whether serious comorbidity present (HCC)    [facility]    [facility] Visit

## 2025-03-29 ENCOUNTER — HOSPITAL ENCOUNTER (OUTPATIENT)
Dept: CT IMAGING | Age: 70
Discharge: HOME | End: 2025-03-29
Attending: STUDENT IN AN ORGANIZED HEALTH CARE EDUCATION/TRAINING PROGRAM
Payer: MEDICARE

## 2025-03-29 DIAGNOSIS — G83.4 CAUDA EQUINA COMPRESSION (HCC): ICD-10-CM

## 2025-03-29 DIAGNOSIS — M54.9 MUSCULOSKELETAL BACK PAIN: ICD-10-CM

## 2025-03-29 DIAGNOSIS — M48.062 LUMBAR STENOSIS WITH NEUROGENIC CLAUDICATION: ICD-10-CM

## 2025-03-29 DIAGNOSIS — R29.898 WEAKNESS OF BOTH LOWER EXTREMITIES: ICD-10-CM

## 2025-03-29 DIAGNOSIS — M48.061 LUMBAR FORAMINAL STENOSIS: ICD-10-CM

## 2025-03-29 DIAGNOSIS — M48.061 STENOSIS OF LATERAL RECESS OF LUMBAR SPINE: ICD-10-CM

## 2025-03-29 DIAGNOSIS — M51.362 DEGENERATION OF INTERVERTEBRAL DISC OF LUMBAR REGION WITH DISCOGENIC BACK PAIN AND LOWER EXTREMITY PAIN: ICD-10-CM

## 2025-03-29 DIAGNOSIS — M43.16 SPONDYLOLISTHESIS OF LUMBAR REGION: ICD-10-CM

## 2025-03-29 DIAGNOSIS — M54.16 LUMBAR RADICULOPATHY: ICD-10-CM

## 2025-03-29 DIAGNOSIS — M47.816 LUMBAR SPONDYLOSIS: ICD-10-CM

## 2025-03-29 DIAGNOSIS — M47.816 FACET ARTHROPATHY, LUMBAR: ICD-10-CM

## 2025-03-29 PROCEDURE — 72131 CT LUMBAR SPINE W/O DYE: CPT | Performed by: STUDENT IN AN ORGANIZED HEALTH CARE EDUCATION/TRAINING PROGRAM

## 2025-03-29 RX ORDER — ATORVASTATIN CALCIUM 40 MG/1
40 TABLET, FILM COATED ORAL NIGHTLY
Qty: 100 TABLET | Refills: 2 | Status: SHIPPED | OUTPATIENT
Start: 2025-03-29

## 2025-04-01 DIAGNOSIS — E11.65 UNCONTROLLED TYPE 2 DIABETES MELLITUS WITH HYPERGLYCEMIA (HCC): ICD-10-CM

## 2025-04-01 RX ORDER — BLOOD SUGAR DIAGNOSTIC
STRIP MISCELLANEOUS
Qty: 200 STRIP | Refills: 3 | Status: CANCELLED | OUTPATIENT
Start: 2025-04-01

## 2025-04-03 RX ORDER — PEN NEEDLE, DIABETIC 31 G X1/4"
NEEDLE, DISPOSABLE MISCELLANEOUS
Qty: 400 EACH | Refills: 3 | Status: SHIPPED | OUTPATIENT
Start: 2025-04-03

## 2025-04-03 NOTE — TELEPHONE ENCOUNTER
Please review.  Protocol failed/has no protocol.    Last Office Visit: 2025    Requested Prescriptions     Pending Prescriptions Disp Refills    Glucose Blood (ACCU-CHEK GUIDE TEST) In Vitro Strip 200 strip 3     Si each by In Vitro route 2 (two) times daily.    Blood Glucose Monitoring Suppl (ACCU-CHEK GUIDE) w/Device Does not apply Kit 1 kit 0     Si kit As Directed.     Signed Prescriptions Disp Refills    Insulin Pen Needle (TECHLITE PEN NEEDLES) 32G X 6 MM Does not apply Misc 400 each 3     Sig: Use as directed 4 times daily     Authorizing Provider: RADHA THORNTON     Ordering User: KOJO KENDALL

## 2025-04-04 ENCOUNTER — OFFICE VISIT (OUTPATIENT)
Dept: PHYSICAL MEDICINE AND REHAB | Facility: CLINIC | Age: 70
End: 2025-04-04
Payer: COMMERCIAL

## 2025-04-04 VITALS
DIASTOLIC BLOOD PRESSURE: 68 MMHG | HEIGHT: 66 IN | BODY MASS INDEX: 41.46 KG/M2 | SYSTOLIC BLOOD PRESSURE: 126 MMHG | OXYGEN SATURATION: 95 % | HEART RATE: 83 BPM | WEIGHT: 258 LBS

## 2025-04-04 DIAGNOSIS — M48.062 LUMBAR STENOSIS WITH NEUROGENIC CLAUDICATION: Primary | ICD-10-CM

## 2025-04-04 DIAGNOSIS — G62.9 POLYNEUROPATHY: ICD-10-CM

## 2025-04-04 PROCEDURE — 99214 OFFICE O/P EST MOD 30 MIN: CPT | Performed by: PHYSICAL MEDICINE & REHABILITATION

## 2025-04-04 PROCEDURE — 3078F DIAST BP <80 MM HG: CPT | Performed by: PHYSICAL MEDICINE & REHABILITATION

## 2025-04-04 PROCEDURE — 1160F RVW MEDS BY RX/DR IN RCRD: CPT | Performed by: PHYSICAL MEDICINE & REHABILITATION

## 2025-04-04 PROCEDURE — 1159F MED LIST DOCD IN RCRD: CPT | Performed by: PHYSICAL MEDICINE & REHABILITATION

## 2025-04-04 PROCEDURE — 3074F SYST BP LT 130 MM HG: CPT | Performed by: PHYSICAL MEDICINE & REHABILITATION

## 2025-04-04 PROCEDURE — 3008F BODY MASS INDEX DOCD: CPT | Performed by: PHYSICAL MEDICINE & REHABILITATION

## 2025-04-04 PROCEDURE — 1125F AMNT PAIN NOTED PAIN PRSNT: CPT | Performed by: PHYSICAL MEDICINE & REHABILITATION

## 2025-04-04 RX ORDER — BLOOD SUGAR DIAGNOSTIC
1 STRIP MISCELLANEOUS 2 TIMES DAILY
Qty: 200 STRIP | Refills: 3 | Status: SHIPPED | OUTPATIENT
Start: 2025-04-04

## 2025-04-04 RX ORDER — PREGABALIN 150 MG/1
150 CAPSULE ORAL EVERY EVENING
Qty: 90 CAPSULE | Refills: 0 | Status: SHIPPED | OUTPATIENT
Start: 2025-04-04

## 2025-04-04 RX ORDER — AMITRIPTYLINE HYDROCHLORIDE 50 MG/1
50 TABLET ORAL NIGHTLY
Qty: 90 TABLET | Refills: 2 | Status: SHIPPED | OUTPATIENT
Start: 2025-04-04

## 2025-04-04 RX ORDER — BLOOD-GLUCOSE METER
1 EACH MISCELLANEOUS AS DIRECTED
Qty: 1 KIT | Refills: 0 | Status: SHIPPED | OUTPATIENT
Start: 2025-04-04

## 2025-04-04 NOTE — PROGRESS NOTES
The following individual(s) verbally consented to be recorded using ambient AI listening technology and understand that they can each withdraw their consent to this listening technology at any point by asking the clinician to turn off or pause the recording:    Patient name: Beto Andrea     Progress note    C/C:   Chief Complaint   Patient presents with    Follow - Up     LOV 03/06/2025. Pt here for f/u presents with BL low back pain that radiates down posterior of upper legs and lateral aspect of lower leg. Pain is 8/10. Admits N/T, R>L. Admits weakness. Taking pregabalin, prednisone. Denies PT. CT L-spine 3/29/25. MRI L-spine 3/18/25. XR L spine 3/21/25.        History of Present Illness  The patient, a 69-year-old with a history of diabetes, presents for follow-up of lower back and bilateral leg pain. Since the last visit, he was evaluated by a neurosurgeon who ordered CT and MRI of the lumbar spine, with an MRI of the cervical spine scheduled. The patient reports that the neurosurgeon explained that narrowing in the canal is causing his symptoms.    The patient's walking has improved slightly, and he has been more active.  He is now able to walk 2 blocks without necessarily provoking back right leg pains.  However, he still experiences weakness when standing for long periods. He has moments when his legs feel like they're going to give out. The patient is also experiencing pain in his hands, which has been brought to the attention of the neurosurgeon.    The patient is currently on prescribed medications, including pregabalin and amitriptyline, which he takes at night. He reports no side effects from these medications.     Pertinent allergies: Allergies[1]     Physical exam:  /68 (BP Location: Right arm, Patient Position: Sitting, Cuff Size: adult)   Pulse 83   Ht 66\"   Wt 258 lb (117 kg)   SpO2 95%   BMI 41.64 kg/m²      Crews test - b/l  Slight weakness right FDI; normal finger flexors,  ABP bilaterally  2/4 BUE reflexes    Lumbar spine exam:    No skin rash lumbar/upper sacral region  No pain with lumbar flexion. No pain with lumbar extension.  5/5 LE strength b/l  SILT b/l LE  1/4 quad, gastrocs reflexes b/l  SLR - b/l    IMAGING: MRI lumbar spine dated 3/18/2025 independently reviewed, as well as report.  Spondylolisthesis grade 1 of L4 upon L5.  This along with significant facet hypertrophy, diffuse disc bulging results in severe central stenosis at L4-L5.   Assessment & Plan  Lumbar stenosis with neurogenic claudication    Lumbar stenosis at L4-L5 is causing neurogenic claudication. He was evaluated by neurosurgeon Dr. Zuniga for potential surgery, pending a cervical spine evaluation. Weight loss is critical for both nonsurgical and surgical outcomes and spine stability, whether he decides for or defers surgery. Advise avoiding walking more than a quarter to a third of a mile without seated rest breaks. Recommend seated exercises or use of a stationary bike if available. Provided a set of home exercises.  Ordered physical therapy for neutral to flexion lumbar stabilization, if a suitable location is found. Discuss weight loss and suggest consulting with Dr. Clarke, his primary care physician or a weight loss clinic.  Continue pregabalin and amitriptyline for symptom management. Refill prescriptions for both medications.    Discussed epidural steroid injection; he is getting by reasonably well for the time being, and defers.     Diabetes Mellitus (DM)    Weight loss is discussed as beneficial for both diabetes management and keeping the spine stable. Discuss weight loss strategies with Dr. Clarke or consider referral to a weight loss clinic.    Rojelio Mercedes DO  Physical Medicine and Rehabilitation  [facility]         [1] No Known Allergies

## 2025-04-04 NOTE — PROGRESS NOTES
The following individual(s) verbally consented to be recorded using ambient AI listening technology and understand that they can each withdraw their consent to this listening technology at any point by asking the clinician to turn off or pause the recording:    Patient name: Beto Andrea

## 2025-04-12 ENCOUNTER — HOSPITAL ENCOUNTER (OUTPATIENT)
Dept: MRI IMAGING | Facility: HOSPITAL | Age: 70
Discharge: HOME | End: 2025-04-12
Attending: STUDENT IN AN ORGANIZED HEALTH CARE EDUCATION/TRAINING PROGRAM
Payer: MEDICARE

## 2025-04-12 DIAGNOSIS — M48.061 STENOSIS OF LATERAL RECESS OF LUMBAR SPINE: ICD-10-CM

## 2025-04-12 DIAGNOSIS — M48.062 LUMBAR STENOSIS WITH NEUROGENIC CLAUDICATION: ICD-10-CM

## 2025-04-12 DIAGNOSIS — G83.4 CAUDA EQUINA COMPRESSION (HCC): ICD-10-CM

## 2025-04-12 DIAGNOSIS — M43.16 SPONDYLOLISTHESIS OF LUMBAR REGION: ICD-10-CM

## 2025-04-12 DIAGNOSIS — M54.9 MUSCULOSKELETAL BACK PAIN: ICD-10-CM

## 2025-04-12 DIAGNOSIS — M51.362 DEGENERATION OF INTERVERTEBRAL DISC OF LUMBAR REGION WITH DISCOGENIC BACK PAIN AND LOWER EXTREMITY PAIN: ICD-10-CM

## 2025-04-12 DIAGNOSIS — M47.816 FACET ARTHROPATHY, LUMBAR: ICD-10-CM

## 2025-04-12 DIAGNOSIS — M47.812 CERVICAL SPONDYLOSIS: ICD-10-CM

## 2025-04-12 DIAGNOSIS — R29.898 WEAKNESS OF BOTH LOWER EXTREMITIES: ICD-10-CM

## 2025-04-12 DIAGNOSIS — M48.061 LUMBAR FORAMINAL STENOSIS: ICD-10-CM

## 2025-04-12 DIAGNOSIS — M47.816 LUMBAR SPONDYLOSIS: ICD-10-CM

## 2025-04-12 DIAGNOSIS — M54.16 LUMBAR RADICULOPATHY: ICD-10-CM

## 2025-04-12 PROCEDURE — 72141 MRI NECK SPINE W/O DYE: CPT | Performed by: STUDENT IN AN ORGANIZED HEALTH CARE EDUCATION/TRAINING PROGRAM

## 2025-04-17 ENCOUNTER — OFFICE VISIT (OUTPATIENT)
Dept: FAMILY MEDICINE CLINIC | Facility: CLINIC | Age: 70
End: 2025-04-17

## 2025-04-17 ENCOUNTER — LAB ENCOUNTER (OUTPATIENT)
Dept: LAB | Age: 70
End: 2025-04-17
Attending: FAMILY MEDICINE
Payer: MEDICARE

## 2025-04-17 VITALS
BODY MASS INDEX: 41 KG/M2 | HEART RATE: 71 BPM | WEIGHT: 256 LBS | SYSTOLIC BLOOD PRESSURE: 117 MMHG | DIASTOLIC BLOOD PRESSURE: 75 MMHG

## 2025-04-17 DIAGNOSIS — Z79.4 TYPE 2 DIABETES MELLITUS WITH DIABETIC POLYNEUROPATHY, WITH LONG-TERM CURRENT USE OF INSULIN (HCC): Primary | ICD-10-CM

## 2025-04-17 DIAGNOSIS — I10 ESSENTIAL HYPERTENSION: ICD-10-CM

## 2025-04-17 DIAGNOSIS — Z79.4 TYPE 2 DIABETES MELLITUS WITH DIABETIC POLYNEUROPATHY, WITH LONG-TERM CURRENT USE OF INSULIN (HCC): ICD-10-CM

## 2025-04-17 DIAGNOSIS — E11.42 TYPE 2 DIABETES MELLITUS WITH DIABETIC POLYNEUROPATHY, WITH LONG-TERM CURRENT USE OF INSULIN (HCC): Primary | ICD-10-CM

## 2025-04-17 DIAGNOSIS — E55.9 HYPOVITAMINOSIS D: ICD-10-CM

## 2025-04-17 DIAGNOSIS — M47.27 OSTEOARTHRITIS OF SPINE WITH RADICULOPATHY, LUMBOSACRAL REGION: ICD-10-CM

## 2025-04-17 DIAGNOSIS — M48.07 SPINAL STENOSIS OF LUMBOSACRAL REGION: ICD-10-CM

## 2025-04-17 DIAGNOSIS — E11.42 TYPE 2 DIABETES MELLITUS WITH DIABETIC POLYNEUROPATHY, WITH LONG-TERM CURRENT USE OF INSULIN (HCC): ICD-10-CM

## 2025-04-17 DIAGNOSIS — E78.00 PURE HYPERCHOLESTEROLEMIA: ICD-10-CM

## 2025-04-17 LAB
ALBUMIN SERPL-MCNC: 4.8 G/DL (ref 3.2–4.8)
ALBUMIN/GLOB SERPL: 1.8 {RATIO} (ref 1–2)
ALP LIVER SERPL-CCNC: 63 U/L (ref 45–117)
ALT SERPL-CCNC: 20 U/L (ref 10–49)
ANION GAP SERPL CALC-SCNC: 11 MMOL/L (ref 0–18)
AST SERPL-CCNC: 14 U/L (ref ?–34)
BASOPHILS # BLD AUTO: 0.06 X10(3) UL (ref 0–0.2)
BASOPHILS NFR BLD AUTO: 0.6 %
BILIRUB SERPL-MCNC: 0.7 MG/DL (ref 0.2–1.1)
BUN BLD-MCNC: 18 MG/DL (ref 9–23)
BUN/CREAT SERPL: 18.2 (ref 10–20)
CALCIUM BLD-MCNC: 9.7 MG/DL (ref 8.7–10.4)
CHLORIDE SERPL-SCNC: 105 MMOL/L (ref 98–112)
CHOLEST SERPL-MCNC: 203 MG/DL (ref ?–200)
CO2 SERPL-SCNC: 24 MMOL/L (ref 21–32)
CREAT BLD-MCNC: 0.99 MG/DL (ref 0.7–1.3)
CREAT UR-SCNC: 70.6 MG/DL
DEPRECATED RDW RBC AUTO: 44.5 FL (ref 35.1–46.3)
EGFRCR SERPLBLD CKD-EPI 2021: 82 ML/MIN/1.73M2 (ref 60–?)
EOSINOPHIL # BLD AUTO: 0.27 X10(3) UL (ref 0–0.7)
EOSINOPHIL NFR BLD AUTO: 2.7 %
ERYTHROCYTE [DISTWIDTH] IN BLOOD BY AUTOMATED COUNT: 13.6 % (ref 11–15)
FASTING PATIENT LIPID ANSWER: NO
FASTING STATUS PATIENT QL REPORTED: NO
GLOBULIN PLAS-MCNC: 2.6 G/DL (ref 2–3.5)
GLUCOSE BLD-MCNC: 168 MG/DL (ref 70–99)
HCT VFR BLD AUTO: 43.3 % (ref 39–53)
HDLC SERPL-MCNC: 54 MG/DL (ref 40–59)
HGB BLD-MCNC: 14.7 G/DL (ref 13–17.5)
IMM GRANULOCYTES # BLD AUTO: 0.05 X10(3) UL (ref 0–1)
IMM GRANULOCYTES NFR BLD: 0.5 %
LDLC SERPL CALC-MCNC: 99 MG/DL (ref ?–100)
LYMPHOCYTES # BLD AUTO: 2.04 X10(3) UL (ref 1–4)
LYMPHOCYTES NFR BLD AUTO: 20.3 %
MCH RBC QN AUTO: 30.6 PG (ref 26–34)
MCHC RBC AUTO-ENTMCNC: 33.9 G/DL (ref 31–37)
MCV RBC AUTO: 90 FL (ref 80–100)
MICROALBUMIN UR-MCNC: <0.3 MG/DL
MONOCYTES # BLD AUTO: 0.56 X10(3) UL (ref 0.1–1)
MONOCYTES NFR BLD AUTO: 5.6 %
NEUTROPHILS # BLD AUTO: 7.05 X10 (3) UL (ref 1.5–7.7)
NEUTROPHILS # BLD AUTO: 7.05 X10(3) UL (ref 1.5–7.7)
NEUTROPHILS NFR BLD AUTO: 70.3 %
NONHDLC SERPL-MCNC: 149 MG/DL (ref ?–130)
OSMOLALITY SERPL CALC.SUM OF ELEC: 296 MOSM/KG (ref 275–295)
PLATELET # BLD AUTO: 189 10(3)UL (ref 150–450)
POTASSIUM SERPL-SCNC: 4.1 MMOL/L (ref 3.5–5.1)
PROT SERPL-MCNC: 7.4 G/DL (ref 5.7–8.2)
RBC # BLD AUTO: 4.81 X10(6)UL (ref 3.8–5.8)
SODIUM SERPL-SCNC: 140 MMOL/L (ref 136–145)
TRIGL SERPL-MCNC: 298 MG/DL (ref 30–149)
TSI SER-ACNC: 1.12 UIU/ML (ref 0.55–4.78)
VIT D+METAB SERPL-MCNC: 28.4 NG/ML (ref 30–100)
VLDLC SERPL CALC-MCNC: 50 MG/DL (ref 0–30)
WBC # BLD AUTO: 10 X10(3) UL (ref 4–11)

## 2025-04-17 PROCEDURE — 82043 UR ALBUMIN QUANTITATIVE: CPT

## 2025-04-17 PROCEDURE — 82570 ASSAY OF URINE CREATININE: CPT

## 2025-04-17 PROCEDURE — 1159F MED LIST DOCD IN RCRD: CPT | Performed by: FAMILY MEDICINE

## 2025-04-17 PROCEDURE — 84443 ASSAY THYROID STIM HORMONE: CPT

## 2025-04-17 PROCEDURE — 3078F DIAST BP <80 MM HG: CPT | Performed by: FAMILY MEDICINE

## 2025-04-17 PROCEDURE — 80053 COMPREHEN METABOLIC PANEL: CPT

## 2025-04-17 PROCEDURE — 84153 ASSAY OF PSA TOTAL: CPT | Performed by: FAMILY MEDICINE

## 2025-04-17 PROCEDURE — 85025 COMPLETE CBC W/AUTO DIFF WBC: CPT

## 2025-04-17 PROCEDURE — 3072F LOW RISK FOR RETINOPATHY: CPT | Performed by: FAMILY MEDICINE

## 2025-04-17 PROCEDURE — 80061 LIPID PANEL: CPT

## 2025-04-17 PROCEDURE — 84154 ASSAY OF PSA FREE: CPT | Performed by: FAMILY MEDICINE

## 2025-04-17 PROCEDURE — 82306 VITAMIN D 25 HYDROXY: CPT

## 2025-04-17 PROCEDURE — 36415 COLL VENOUS BLD VENIPUNCTURE: CPT | Performed by: FAMILY MEDICINE

## 2025-04-17 PROCEDURE — 99214 OFFICE O/P EST MOD 30 MIN: CPT | Performed by: FAMILY MEDICINE

## 2025-04-17 PROCEDURE — 3074F SYST BP LT 130 MM HG: CPT | Performed by: FAMILY MEDICINE

## 2025-04-17 RX ORDER — SEMAGLUTIDE 0.68 MG/ML
INJECTION, SOLUTION SUBCUTANEOUS
Qty: 1 EACH | Refills: 12 | Status: SHIPPED | OUTPATIENT
Start: 2025-04-17

## 2025-04-17 RX ORDER — CELECOXIB 200 MG/1
200 CAPSULE ORAL DAILY
Qty: 90 CAPSULE | Refills: 1 | Status: SHIPPED | OUTPATIENT
Start: 2025-04-17

## 2025-04-17 RX ORDER — ALBUTEROL SULFATE 0.83 MG/ML
2.5 SOLUTION RESPIRATORY (INHALATION) EVERY 6 HOURS PRN
Qty: 25 EACH | Refills: 1 | Status: SHIPPED | OUTPATIENT
Start: 2025-04-17

## 2025-04-17 RX ORDER — LANCETS
1 EACH MISCELLANEOUS 2 TIMES DAILY
Qty: 200 EACH | Refills: 3 | Status: CANCELLED | OUTPATIENT
Start: 2025-04-17

## 2025-04-17 NOTE — PROGRESS NOTES
4/17/2025  1:38 PM    Beto Andrea is a 69 year old male.    Chief complaint(s):   Chief Complaint   Patient presents with    Diabetes     6 week f/up     HPI:     Beto Andrae primary complaint is regarding multiple complaints.       Patient Beto Andrea is a 68 year old male is here to be evaluated for type 2 diabetes.  Specifically, male has type 2, insulin none requiring diabetes. Compliance with treatment has been POOR.  Patient's diabetes was first diagnosed >15 years ago.  Patient follows a 2000 calorie ADA diet.  Patient report experiencing the following diabetes related symptoms; Positive for polyuria, Positive for polydipsia, Positive for blurred vision.  Depression symptoms include none.  Tobacco screen: none  smoker.  Current meds include :   oral hypoglycemic include: Xigduo Xr 5-1000mg, Trulicity 1.5 weekly  insulin/injectable : Trulciity 1.5 weekly, Humalog 75/25 increased to 54u Q am, 45u Q pm  Hypoglycemia severity is not applicable.he reports home blood glucose readings have been 128 (#s) and believes  having fair glucose control.  Most recent lab results include glycohemoglobin 7.0%, microalbuminuria have been ?.  In regard to preventative care, his last ophthalmology exam was in >2 months ago.  Opthalmic evaluation have shown no pathology.  Concurrent relative health problems include HTN, hyperlipidemia.    Beto Andreais a 68 year old male presents with hypertension.  This was first diagnosed more than 5 years ago.  Current nonpharmacologic treatment includes low sodium diet, exercise, and meditation.  his current cardiac medication(s) regimen includes: Metoprolol 25 mg Q day, Losaratn 25 mg.  He has not kept a blood pressure diary, but states that his blood pressures have been fair controll.  he is tolerating his medication(s)  well without side effects.  Compliance with treatment has been good.     In addition patient continues to have spinal lumbar stenosis symptomatic  with right posterior thigh pains.  Patient recently underwent MRI of the lumbar spine results are still pending.  Surgeon is recommending surgery but he wants to think about it.  Request refills on his Celebrex.    HISTORY:  Past Medical History[1]   Past Surgical History[2]   Family History[3]   Social History: Short Social Hx on File[4]     Immunizations:   Immunization History   Administered Date(s) Administered    Covid-19 Vaccine Pfizer 30 mcg/0.3 ml 12/23/2021    Covid-19 Vaccine Pfizer Bivalent 30mcg/0.3mL 09/28/2022    FLU VAC High Dose 65 YRS & Older PRSV Free (66815) 11/09/2021    FLULAVAL 6 months & older 0.5 ml Prefilled syringe ([id number]) 09/14/2020    FLUZONE 6 months and older PFS 0.5 ml ([id number]) 08/31/2017, 09/26/2018    Fluvirin, 3 Years & >, Im 09/09/2013, 10/21/2014    HEP A/HEP B Combined 08/25/2016    HEP B 02/24/2015, 11/07/2015    Hep B, Unspecified Formulation 02/24/2015, 11/07/2015    Influenza 11/07/2015, 11/21/2016    Pfizer Covid-19 Vaccine 30mcg/0.3ml 12yrs+ 09/22/2023    Pneumococcal Conjugate PCV20 08/05/2024    Pneumovax 23 05/15/2020    TDAP 02/24/2015, 02/25/2019    Zoster Vaccine Live (Zostavax) 03/03/2014, 11/08/2017    Zoster Vaccine Recombinant Adjuvanted (Shingrix) 10/11/2022, 05/23/2023       Medications (Active prior to today's visit):  Current Medications[5]    Allergies:  Allergies[6]      ROS:   Review of Systems   Constitutional:  Negative for appetite change, fatigue and fever.   Respiratory:  Negative for shortness of breath.    Cardiovascular:  Negative for chest pain.   Gastrointestinal:  Negative for abdominal pain.   Endocrine: Negative for polydipsia and polyuria.   Musculoskeletal:  Negative for myalgias.        [place]   Skin:  Negative for rash.   Neurological:  Negative for dizziness, weakness and headaches.       PHYSICAL EXAM:   VS: /75 (BP Location: Right arm, Patient Position: Sitting, Cuff Size: large)   Pulse 71   Wt 256 lb (116.1 kg)   BMI 41.32  kg/m²     Physical Exam  Vitals reviewed.   Constitutional:       Appearance: Normal appearance. He is well-developed.   HENT:      Head: Normocephalic.   Eyes:      General: No scleral icterus.     Conjunctiva/sclera: Conjunctivae normal.   Cardiovascular:      Rate and Rhythm: Normal rate and regular rhythm.   Pulmonary:      Effort: Pulmonary effort is normal.      Breath sounds: Normal breath sounds.   Musculoskeletal:      Cervical back: Neck supple.   Skin:     Findings: No rash.   Psychiatric:         Mood and Affect: Mood normal.         LABORATORY RESULTS:   No results found for: \"URCOLOR\", \"URCLA\", \"URINELEUK\", \"URINENITRITE\", \"URINEBLOOD\"   Results for orders placed or performed in visit on 03/06/25   POC Glycohemoglobin [38012]    Collection Time: 03/06/25  9:39 AM   Result Value Ref Range    HEMOGLOBIN A1C 7.0 (A) 4.3 - 5.6 %    Cartridge Lot# [id number] Numeric    Cartridge Expiration Date 11/08/26 Date       EKG / Spirometry : -     Radiology: CT SPINE LUMBAR (CPT=72131)  Result Date: 3/29/2025  PROCEDURE:  CT SPINE LUMBAR (CPT=72131)  COMPARISON:  None.  INDICATIONS:  G83.4 Cauda equina compression (HCC) M51.362 Degeneration of intervertebral disc of lumbar region with discogenic back pain and lower extremity pain M47.816 Fa*  TECHNIQUE:  Noncontrast CT scanning is performed through the lumbar spine.  Multiplanar reconstructions are generated.  Dose reduction techniques were used. Dose information is transmitted to the ACR (American College of Radiology) NRDR (National Radiology Data Registry) which includes the Dose Index Registry.  PATIENT STATED HISTORY: (As transcribed by Technologist)  Patient presents with lower back pain radiating to bilateral back of thighs and down to leg.  Bilateral lower extremities weakness.    FINDINGS:   Developmental shortening of the lumbar pedicles is present, which reduces the baseline caliber of the central spinal canal on a developmental basis.  Upon this  developmental status, there are acquired degenerative disc and facet changes present.   Advanced facet arthropathy on the right L4-L5 with vacuum facet, sclerosis, hypertrophy.  The facet arthropathy on the left side at this level is more moderate, but there appears to be a developmental anomaly with the inferior aspect of the or articular process L4 having unfused ossification center/pseudarthrosis.  For example sagittal image 28 axial image 65 and others.  This level also shows disc degeneration including vacuum disc, circumferential disc bulging, and there is degenerative grade 1 anterior subluxation of L4.  All of these features, developmental as well as acquired, combine to produce central spinal canal stenosis at least moderate in degree, as well as bilateral neural foraminal stenosis.  Subarticular zone stenosis L4-L5 also seen.  L5-S1 shows mild facet arthropathy, and minimal disc bulging.  Other lumbar levels show no sign of stenosis or advanced degenerative disease.  No compression or other fracture seen.  No other subluxations.  No paraspinal mass. There are atherosclerotic changes including calcification involving the aorta, but no evidence for aneurysm involving the aorta.              CONCLUSION:  Developmentally short pedicles with acquired degenerative disc and facet disease L4-5 with significant central spinal canal stenosis considered at least moderate in degree, with moderate bilateral neural foraminal stenosis and subarticular zone stenosis.   LOCATION:  [id number]   Dictated by (CST): Albert Young MD on 3/29/2025 at 12:25 PM     Finalized by (CST): Albert Young MD on 3/29/2025 at 12:29 PM       XR LUMBAR SPINE AP LAT FLEX EXT (CPT=72110)  Result Date: 3/26/2025  PROCEDURE: XR LUMBAR SPINE AP LAT FLEX EXT EM (CPT=72120)  COMPARISON: [facility], XR SCOLIOSIS SPINE 2-3 VIEWS  (CPT=72082), 3/21/2025, 11:33 AM.  [facility], MRI SPINE LUMBAR (QHU=38647),  2020, 2:27 PM.  [facility], XR LUMBAR SPINE AP LAT FLEX EXT (CPT=72110), 10/13/2020, 1:14 PM.  INDICATIONS: Degeneration of intervertebral disc of lumbar region .  TECHNIQUE: Lumbar spine radiographs, 4 views (AP, lateral, flexion, and extension views)   FINDINGS:   ALIGNMENT:   The lumbar lordosis is maintained.  There is grade 1 anterolisthesis of L4 on L5. VERTEBRAL BODIES:   The vertebral body heights are maintained.  No acute fracture.  No aggressive osseous lesion. DISC SPACES: Minimal disc height loss at L4-L5.  There are few small ventral disc osteophyte complexes.  There is lower lumbar predominant facet arthropathy. SACROILIAC JOINTS: Intact. FLEXION/EXTENSION VIEWS:   Normal range of motion.  No subluxation during flexion and extension.  OTHER:   There is atherosclerotic calcification of the abdominal aorta.         CONCLUSION:   1. Redemonstrated grade 1 anterolisthesis of L4 on L5.  No evidence of dynamic instability. 2. Multilevel degenerative changes of the lumbar spine, which are most advanced at L4-L5.  3. No acute fracture or traumatic listhesis of the lumbar spine.     Dictated by (CST): Yfn Hernández MD on 3/26/2025 at 2:09 PM     Finalized by (CST): Yfn Hernández MD on 3/26/2025 at 2:11 PM          XR STANDING LONG LEG SURVEY BILAT (CPT=77073-50)  Result Date: 3/26/2025  PROCEDURE: XR STANDING LONG LEG SURVEY BILAT EM (CPT=77073)  COMPARISON: None.  INDICATIONS: Degeneration of intervertebral disc of lumbar region .  TECHNIQUE: Focused radiographic images of the hips, knees, and ankles were obtained, with a ruler for reference.  FINDINGS:   MEASUREMENTS  RIGHT LOWER EXTREMITY FEMUR: 49.2 cm.  LOWER LE.0 cm HEAD OF FEMUR TO ANKLE MORTISE: 84.2 cm.   LEFT LOWER EXTREMITY FEMUR: 49.6cm.  LOWER LE.0 cm.  HEAD OF FEMUR TO ANKLE MORTISE: 84.6 cm.   OTHER: Limited assessment of the anatomy on large field-of-view imaging.  Degenerative changes of knee joints  bilaterally.         CONCLUSION: Leg lengths as above    Dictated by (CST): Daina Frank MD on 3/26/2025 at 1:20 PM     Finalized by (CST): Daina Frank MD on 3/26/2025 at 1:23 PM          XR SCOLIOSIS SPINE 2-3 VIEWS (CPT=72082)  Result Date: 3/26/2025  PROCEDURE: XR SCOLIOSIS SPINE 2-3 VIEWS SH (CPT=72082)  COMPARISON: None.  INDICATIONS: Degeneration of intervertebral disc of lumbar region .  TECHNIQUE: Long cassette radiographs of the thoracolumbar spine were obtained in AP and lateral standing projections.   FINDINGS:   ALIGNMENT: No measurable scoliosis PELVIC TILT: None.  BONY ANOMALIES: Limited assessment of the anatomy on large field-of-view imaging.  There is multilevel degenerative disease of the spine.  Trace anterolisthesis of L4 on L5. OTHER: Large amount of stool throughout the colon, suggesting constipation. 6 mm nodule projecting over the right lower lung There are low lung volumes with bibasilar subsegmental atelectasis.  Blunting of the posterior costophrenic angles, suboptimally assessed on large field-of-view imaging.  Could be due to trace effusions or peripheral scarring/atelectasis.           CONCLUSION:   No measurable scoliosis.  Degenerative disease of the spine, better assessed on dedicated lumbar spine radiographs.  Suspected right lower lung nodule measuring 6 mm. Recommend further evaluation with dedicated PA and lateral chest radiographs.  Secure message regarding this recommendation sent via epic to Dr. Zuniga On 03/26/2025 at 1:19 p.m.    Dictated by (CST): Daina Frank MD on 3/26/2025 at 1:17 PM     Finalized by (CST): Daina Frank MD on 3/26/2025 at 1:20 PM          MRI SPINE LUMBAR (CPT=72148)  Result Date: 3/19/2025  PROCEDURE:  MRI SPINE LUMBAR (CPT=72148)  COMPARISON:  None.  INDICATIONS:  M48.07 Spinal stenosis of lumbosacral region  TECHNIQUE:  Multiplanar T1 and T2 weighted images including fat suppression sequences.  Images acquired in sagittal and axial  planes.   PATIENT STATED HISTORY: (As transcribed by Technologist)  Patient is being evaluated for Spinal stenosis of lumbosacral region    FINDINGS: There is 3-4 mm anterior listhesis of L4 over L5.  Left pars interarticularis defect at L4. Vertebral body heights are maintained throughout the lumbar spine. Disc spaces are maintained throughout the lumbar spine. Marrow signal is unremarkable. The conus is at T12/L1. The visualized portion of the spinal cord is of normal caliber without focal signal abnormality. T12-L1:  Minimal disc bulge.  No spinal canal stenosis.  Moderate bilateral neural foraminal stenosis. L1-L2:  Minimal disc bulge.  No spinal canal stenosis.  Mild to moderate bilateral neural foraminal stenosis. L2-L3:  Minimal disc bulge.  No spinal canal stenosis.  Mild bilateral neural foraminal stenosis. L3-L4:  Minimal disc bulge.  Ligamentum flavum thickening.  No spinal canal stenosis.  Mild to moderate bilateral neural foraminal stenosis. L4-L5:  Grade 1 anterior listhesis of L4 over L5 with broad-based disc bulge and facet hypertrophic changes is causing marked severe spinal canal stenosis.  Marked severe bilateral neural foraminal stenosis. L5-S1:  Minimal disc bulge with facet hypertrophic changes.  No spinal canal or neural foraminal stenosis.            CONCLUSION:   Grade 1 anterior listhesis L4-L5 along with left pars interarticularis defect is noted.  Severe spinal canal and bilateral neural foraminal stenosis at L4-L5.   LOCATION:  [facility]   Dictated by (CST): Dejan Peña MD on 3/19/2025 at 8:23 AM     Finalized by (CST): Dejan Peña MD on 3/19/2025 at 8:25 AM          ASSESSMENT/PLAN:   Assessment   Encounter Diagnoses   Name Primary?    Type 2 diabetes mellitus with diabetic polyneuropathy, with long-term current use of insulin (HCC) Yes    Essential hypertension     Spinal stenosis of lumbosacral region     Osteoarthritis of spine with radiculopathy, lumbosacral region           1. Type 2 diabetes mellitus with diabetic polyneuropathy, with long-term current use of insulin (HCC)    DIABETES A&P    LABORATORY & ORDERS: Blood test(s) ordered today ; No orders of the defined types were placed in this encounter.    Additional orders include: Added Ozempic  MEDICATIONS:  Current Medications[7].  Requested Prescriptions     Signed Prescriptions Disp Refills    albuterol (2.5 MG/3ML) 0.083% Inhalation Nebu Soln 25 each 1     Sig: Take 3 mL (2.5 mg total) by nebulization every 6 (six) hours as needed.    celecoxib (CELEBREX) 200 MG Oral Cap 90 capsule 1     Sig: Take 1 capsule (200 mg total) by mouth daily.    semaglutide (OZEMPIC, 0.25 OR 0.5 MG/DOSE,) 2 MG/3ML Subcutaneous Solution Pen-injector 1 each 12     Si.25 mg Q week for 4 weeks then 0.5 mg Q week      RECOMMENDATIONS: instructed in use of glucometer ( check fasting glucose daily), return for training in administering insulin injections, adherence to an 1800 calorie ADA diet,  10 pound weight loss, a graduated exercise program, HgbA1C level checked quarterly, daily foot self-inspection, need for yearly flu shots, and avoid all sodas, juices, candy, chocolates, cakes, ice cream, etc.      FOLLOW-UP: Schedule a follow-up visit in 3 months.          2. Essential hypertension    MEDICATIONS: CPM     LABORATORY & ORDERS: No orders of the defined types were placed in this encounter.    RECOMMENDATIONS given include: avoid pseudoephedrine or other stimulants/decongestants in common cold remedies, decrease consumption of alcohol, perform routine monitoring of blood pressure with home blood pressure cuff, exercise, reduction of dietary salt intake, take medication as prescribed, try not to miss doses, smoking cessation, weight loss, and stress reduction.    FOLLOW-UP: Schedule a follow-up visit in 6 months.         3. Spinal stenosis of lumbosacral region  4. Osteoarthritis of spine with radiculopathy, lumbosacral region    MEDICATIONS:    CPM     REFERRALS: [facility] with specialist        RECOMMENDATIONS given include: Patient was reassured of  his medical condition and all questions and concerns were answered. Patient was informed to please, call our office with any new or further questions or concerns that may come up in the near future. Notify Dr Thornton or the [facility] if there is a deterioration or worsening of the medical condition. Also, inform the doctor with any new symptoms or medications' side effects.      FOLLOW-UP: Schedule a follow-up visit in  prn.            Orders This Visit:  No orders of the defined types were placed in this encounter.      Meds This Visit:  Requested Prescriptions     Signed Prescriptions Disp Refills    albuterol (2.5 MG/3ML) 0.083% Inhalation Nebu Soln 25 each 1     Sig: Take 3 mL (2.5 mg total) by nebulization every 6 (six) hours as needed.    celecoxib (CELEBREX) 200 MG Oral Cap 90 capsule 1     Sig: Take 1 capsule (200 mg total) by mouth daily.    semaglutide (OZEMPIC, 0.25 OR 0.5 MG/DOSE,) 2 MG/3ML Subcutaneous Solution Pen-injector 1 each 12     Si.25 mg Q week for 4 weeks then 0.5 mg Q week       Imaging & Referrals:  None         RADHA THORNTON MD         [1]   Past Medical History:   Cataracts, bilateral    Diabetes (HCC)    Essential hypertension    High blood pressure    Hyperlipidemia   [2]   Past Surgical History:  Procedure Laterality Date    Colonoscopy N/A 2020    Procedure: COLONOSCOPY;  Surgeon: Zbigniew Hooker MD;  Location: [facility] ENDOSCOPY    Laminectomy      Ligmt revision,knee,intra-artic Left    [3]   Family History  Problem Relation Age of Onset    Diabetes Mother     Diabetes Sister    [4]   Social History  Socioeconomic History    Marital status: Single   Tobacco Use    Smoking status: Former     Current packs/day: 0.00     Types: Cigarettes     Start date: 6/10/1973     Quit date: 6/10/1993     Years since quittin.8     Passive exposure: Never     Smokeless tobacco: Never   Vaping Use    Vaping status: Never Used   Substance and Sexual Activity    Alcohol use: Yes    Drug use: No   Other Topics Concern    Caffeine Concern Yes     Comment: daily 2 cups    Exercise Yes     Comment: walking 20 min daily   Social History Narrative    The patient does not use an assistive device..      The patient does live in a home with stairs.     Social Drivers of Health     Food Insecurity: Food Insecurity Present (3/6/2025)    NCSS - Food Insecurity     Worried About Running Out of Food in the Last Year: No     Ran Out of Food in the Last Year: Yes   Transportation Needs: No Transportation Needs (3/6/2025)    NCSS - Transportation     Lack of Transportation: No   Housing Stability: Not At Risk (3/6/2025)    NCSS - Housing/Utilities     Has Housing: Yes     Worried About Losing Housing: No     Unable to Get Utilities: No   [5]   Current Outpatient Medications   Medication Sig Dispense Refill    albuterol (2.5 MG/3ML) 0.083% Inhalation Nebu Soln Take 3 mL (2.5 mg total) by nebulization every 6 (six) hours as needed. 25 each 1    celecoxib (CELEBREX) 200 MG Oral Cap Take 1 capsule (200 mg total) by mouth daily. 90 capsule 1    semaglutide (OZEMPIC, 0.25 OR 0.5 MG/DOSE,) 2 MG/3ML Subcutaneous Solution Pen-injector 0.25 mg Q week for 4 weeks then 0.5 mg Q week 1 each 12    Glucose Blood (ACCU-CHEK GUIDE TEST) In Vitro Strip 1 each by In Vitro route 2 (two) times daily. 200 strip 3    Blood Glucose Monitoring Suppl (ACCU-CHEK GUIDE) w/Device Does not apply Kit 1 kit As Directed. 1 kit 0    pregabalin 150 MG Oral Cap Take 1 capsule (150 mg total) by mouth every evening. 90 capsule 0    amitriptyline 50 MG Oral Tab Take 1 tablet (50 mg total) by mouth nightly. 90 tablet 2    Insulin Pen Needle (TECHLITE PEN NEEDLES) 32G X 6 MM Does not apply Misc Use as directed 4 times daily 400 each 3    ATORVASTATIN 40 MG Oral Tab TAKE 1 TABLET BY MOUTH NIGHTLY 100 tablet 2    Nebulizers (VIOS  AEROSOL DELIVERY SYSTEM) Does not apply Misc USE WITH NEBULIZER SOLUTION      tamsulosin 0.4 MG Oral Cap TAKE 1 CAPSULE BY MOUTH DAILY 100 capsule 3    Insulin Lispro Prot & Lispro (HUMALOG MIX 75/25 KWIKPEN) (75-25) 100 UNIT/ML SC SUPN INJECT 54 UNITS SUBCUTANEOUSLY  EVERY MORNING AND 45 UNITS EVERY EVENING 60 mL 4    Dapagliflozin Pro-metFORMIN ER (XIGDUO XR) 5-1000 MG Oral Tablet 24 Hr Take 1 tablet by mouth 2 (two) times daily. 200 tablet 3    losartan 25 MG Oral Tab Take 1 tablet (25 mg total) by mouth daily. 90 tablet 3    metoprolol succinate ER 25 MG Oral Tablet 24 Hr Take 1 tablet (25 mg total) by mouth daily. 100 tablet 3    TRULICITY 1.5 MG/0.5ML Subcutaneous Solution Pen-injector Inject 1.5 mg into the skin once a week. 18 mL 1    hydrocortisone (ANUSOL-HC) 2.5 % External Cream Place 1 Application rectally 2 (two) times daily. 28 g 1    Accu-Chek Softclix Lancets Does not apply Misc 1 Lancet by Finger stick route in the morning and 1 Lancet before bedtime. 200 each 3    Glucose Blood (ACCU-CHEK DAXA PLUS) In Vitro Strip TEST TWICE DAILY 200 strip 3    tiZANidine 4 MG Oral Tab Take 1 tablet (4 mg total) by mouth every 6 (six) hours as needed. 60 tablet 0    Alcohol Swabs (ALCOHOL PADS) 70 % Does not apply Pads For use prior to checking sugar or giving insulin      aspirin EC 81 MG Oral Tab EC Take 1 tablet (81 mg total) by mouth daily. 100 tablet 2    predniSONE 20 MG Oral Tab Take 1 tablet (20 mg total) by mouth daily. (Patient not taking: Reported on 4/17/2025) 6 tablet 0   [6] No Known Allergies  [7]   albuterol (2.5 MG/3ML) 0.083% Inhalation Nebu Soln, Take 3 mL (2.5 mg total) by nebulization every 6 (six) hours as needed., Disp: 25 each, Rfl: 1    celecoxib (CELEBREX) 200 MG Oral Cap, Take 1 capsule (200 mg total) by mouth daily., Disp: 90 capsule, Rfl: 1    semaglutide (OZEMPIC, 0.25 OR 0.5 MG/DOSE,) 2 MG/3ML Subcutaneous Solution Pen-injector, 0.25 mg Q week for 4 weeks then 0.5 mg Q week, Disp: 1  each, Rfl: 12    Glucose Blood (ACCU-CHEK GUIDE TEST) In Vitro Strip, 1 each by In Vitro route 2 (two) times daily., Disp: 200 strip, Rfl: 3    Blood Glucose Monitoring Suppl (ACCU-CHEK GUIDE) w/Device Does not apply Kit, 1 kit As Directed., Disp: 1 kit, Rfl: 0    pregabalin 150 MG Oral Cap, Take 1 capsule (150 mg total) by mouth every evening., Disp: 90 capsule, Rfl: 0    amitriptyline 50 MG Oral Tab, Take 1 tablet (50 mg total) by mouth nightly., Disp: 90 tablet, Rfl: 2    Insulin Pen Needle (TECHLITE PEN NEEDLES) 32G X 6 MM Does not apply Misc, Use as directed 4 times daily, Disp: 400 each, Rfl: 3    ATORVASTATIN 40 MG Oral Tab, TAKE 1 TABLET BY MOUTH NIGHTLY, Disp: 100 tablet, Rfl: 2    Nebulizers (VIOS AEROSOL DELIVERY SYSTEM) Does not apply Misc, USE WITH NEBULIZER SOLUTION, Disp: , Rfl:     tamsulosin 0.4 MG Oral Cap, TAKE 1 CAPSULE BY MOUTH DAILY, Disp: 100 capsule, Rfl: 3    Insulin Lispro Prot & Lispro (HUMALOG MIX 75/25 KWIKPEN) (75-25) 100 UNIT/ML SC SUPN, INJECT 54 UNITS SUBCUTANEOUSLY  EVERY MORNING AND 45 UNITS EVERY EVENING, Disp: 60 mL, Rfl: 4    Dapagliflozin Pro-metFORMIN ER (XIGDUO XR) 5-1000 MG Oral Tablet 24 Hr, Take 1 tablet by mouth 2 (two) times daily., Disp: 200 tablet, Rfl: 3    losartan 25 MG Oral Tab, Take 1 tablet (25 mg total) by mouth daily., Disp: 90 tablet, Rfl: 3    metoprolol succinate ER 25 MG Oral Tablet 24 Hr, Take 1 tablet (25 mg total) by mouth daily., Disp: 100 tablet, Rfl: 3    TRULICITY 1.5 MG/0.5ML Subcutaneous Solution Pen-injector, Inject 1.5 mg into the skin once a week., Disp: 18 mL, Rfl: 1    hydrocortisone (ANUSOL-HC) 2.5 % External Cream, Place 1 Application rectally 2 (two) times daily., Disp: 28 g, Rfl: 1    Accu-Chek Softclix Lancets Does not apply Misc, 1 Lancet by Finger stick route in the morning and 1 Lancet before bedtime., Disp: 200 each, Rfl: 3    Glucose Blood (ACCU-CHEK DAXA PLUS) In Vitro Strip, TEST TWICE DAILY, Disp: 200 strip, Rfl: 3    tiZANidine  4 MG Oral Tab, Take 1 tablet (4 mg total) by mouth every 6 (six) hours as needed., Disp: 60 tablet, Rfl: 0    Alcohol Swabs (ALCOHOL PADS) 70 % Does not apply Pads, For use prior to checking sugar or giving insulin, Disp: , Rfl:     aspirin EC 81 MG Oral Tab EC, Take 1 tablet (81 mg total) by mouth daily., Disp: 100 tablet, Rfl: 2    predniSONE 20 MG Oral Tab, Take 1 tablet (20 mg total) by mouth daily. (Patient not taking: Reported on 4/17/2025), Disp: 6 tablet, Rfl: 0

## 2025-04-18 ENCOUNTER — OFFICE VISIT (OUTPATIENT)
Dept: SURGERY | Facility: CLINIC | Age: 70
End: 2025-04-18
Payer: COMMERCIAL

## 2025-04-18 ENCOUNTER — HOSPITAL ENCOUNTER (OUTPATIENT)
Dept: GENERAL RADIOLOGY | Facility: HOSPITAL | Age: 70
Discharge: HOME | End: 2025-04-18
Attending: STUDENT IN AN ORGANIZED HEALTH CARE EDUCATION/TRAINING PROGRAM
Payer: MEDICARE

## 2025-04-18 VITALS
BODY MASS INDEX: 41.14 KG/M2 | WEIGHT: 256 LBS | OXYGEN SATURATION: 95 % | HEIGHT: 66 IN | SYSTOLIC BLOOD PRESSURE: 168 MMHG | HEART RATE: 68 BPM | DIASTOLIC BLOOD PRESSURE: 92 MMHG

## 2025-04-18 DIAGNOSIS — M48.061 LUMBAR FORAMINAL STENOSIS: ICD-10-CM

## 2025-04-18 DIAGNOSIS — M47.816 LUMBAR SPONDYLOSIS: ICD-10-CM

## 2025-04-18 DIAGNOSIS — M48.062 LUMBAR STENOSIS WITH NEUROGENIC CLAUDICATION: ICD-10-CM

## 2025-04-18 DIAGNOSIS — R29.898 WEAKNESS OF BOTH LOWER EXTREMITIES: ICD-10-CM

## 2025-04-18 DIAGNOSIS — M47.812 CERVICAL SPONDYLOSIS: ICD-10-CM

## 2025-04-18 DIAGNOSIS — M54.9 MUSCULOSKELETAL BACK PAIN: ICD-10-CM

## 2025-04-18 DIAGNOSIS — G83.4 CAUDA EQUINA COMPRESSION (HCC): ICD-10-CM

## 2025-04-18 DIAGNOSIS — M21.70 LEG LENGTH DISCREPANCY: Primary | ICD-10-CM

## 2025-04-18 DIAGNOSIS — M54.16 LUMBAR RADICULOPATHY: ICD-10-CM

## 2025-04-18 DIAGNOSIS — M47.816 FACET ARTHROPATHY, LUMBAR: ICD-10-CM

## 2025-04-18 DIAGNOSIS — G95.20 CERVICAL SPINAL CORD COMPRESSION (HCC): ICD-10-CM

## 2025-04-18 DIAGNOSIS — M43.16 SPONDYLOLISTHESIS OF LUMBAR REGION: ICD-10-CM

## 2025-04-18 DIAGNOSIS — M48.061 STENOSIS OF LATERAL RECESS OF LUMBAR SPINE: ICD-10-CM

## 2025-04-18 DIAGNOSIS — G95.9 CERVICAL MYELOPATHY (HCC): ICD-10-CM

## 2025-04-18 DIAGNOSIS — M51.362 DEGENERATION OF INTERVERTEBRAL DISC OF LUMBAR REGION WITH DISCOGENIC BACK PAIN AND LOWER EXTREMITY PAIN: ICD-10-CM

## 2025-04-18 DIAGNOSIS — M48.02 CERVICAL SPINAL STENOSIS: ICD-10-CM

## 2025-04-18 PROCEDURE — 72052 X-RAY EXAM NECK SPINE 6/>VWS: CPT | Performed by: STUDENT IN AN ORGANIZED HEALTH CARE EDUCATION/TRAINING PROGRAM

## 2025-04-18 NOTE — PROGRESS NOTES
[facility]  Neurological Surgery Established Patient Clinic Note    Beto Andrea  10/27/1955  [id number]  PCP: AUSTIN THORNTON MD  Referring Provider: Austin Thornton MD    REASON FOR VISIT:  Low back pain with radiation    HISTORY OF PRESENT ILLNESS 3/21/2025:  Beto Andrea is a(n) 69 year old male presents for neurosurgical evaluation of progressive lumbar pain and bilateral lower extremity symptoms. He reports longstanding low back pain dating back to a lumbar decompression surgery performed in the early 1990s. His symptoms have significantly worsened over the past 6 months to a year, particularly after a recent period of immobility and functional decline while in [place]. He describes bilateral posterior leg pain with associated weakness and difficulty ambulating. He also reports progressive difficulty with hand function, including impaired ability to flex the fingers and chronic paresthesia in the digits, raising concern for possible cervical myelopathy.    The patient denies any history of recent trauma, overt bowel or bladder dysfunction, or overt upper motor neuron signs. He acknowledges chronic medical conditions including diabetes and hypertension. He takes daily aspirin but is not on any anticoagulants. Notably, he has a remote history of lumbar surgery in the early 1990s (presumed L4-5 laminectomy) but was never fully asymptomatic thereafter. He also has undergone surgery for carpal tunnel syndrome but notes persistent limitations in hand function.    Given his constellation of symptoms, the clinical suspicion is high for significant progression of lumbar spinal stenosis at the prior surgical level as well as possible cervical stenosis or myelopathy, and a comprehensive workup has been initiated.    INTERVAL HISTORY 4/18/2025:  Beto Andrea returns today for approximately 1-month follow-up to discuss his cervical and lumbar spine pathology and to  review recent imaging. He reports that he continues to do well with daily stretching and exercise, noting some modest improvement in his lower extremity pain. However, he continues to experience intermittent arm symptoms, including numbness and tingling, more pronounced on the right side. He denies any new weakness or emergent symptoms. 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    PAST MEDICAL HISTORY:  Past Medical History:    Cataracts, bilateral    Diabetes (HCC)    Essential hypertension    High blood pressure    Hyperlipidemia     PAST SURGICAL HISTORY:  Past Surgical History:   Procedure Laterality Date    Colonoscopy N/A 9/8/2020    Procedure: COLONOSCOPY;  Surgeon: Zbigniew Hooker MD;  Location: [facility] ENDOSCOPY    Laminectomy  1991    Ligmt revision,knee,intra-artic Left      FAMILY HISTORY:  family history includes Diabetes in his mother and sister.    SOCIAL HISTORY:   reports that he quit smoking about 31 years ago. His smoking use included cigarettes. He started smoking about 51 years ago. He has never been exposed to tobacco smoke. He has never used smokeless tobacco. He reports current alcohol use. He reports that he does not use drugs.    ALLERGIES:  Allergies[1]    MEDICATIONS:  Medications Ordered Prior to Encounter[2]    REVIEW OF SYSTEMS:  All other systems were reviewed and were negative except for those previously mentioned in the HPI    PHYSICAL EXAMINATION:  General: No acute distress.  Respiratory: Non-labored respirations bilaterally. No audible wheezing  Cardiovascular: Extremities warm and well-perfused.  Abdomen: Soft, nontender, nondistended.   Musculoskeletal: Moves all extremities well, symmetrically.  Extremities: No edema.    NEUROLOGIC EXAMINATION:  Mental status: Alert and oriented x 3  Speech: Clear, fluent  Cranial nerves: PERRLA, EOMI, face symmetric, with normal strength  and sensation, tongue and palate midline, SCM 5/5 bilaterally  Motor:     RIGHT  Delt 5/5   Bic 5/5  Tri 5/5   HI 5/5    4/5  IP 5/5   Quad 5/5   Ham 5/5   AT 5/5   EHL 5/5 [place] 5/5     LEFT    Delt 5/5   Bic 5/5  Tri 5/5   HI 5/5    4/5  IP 5/5   Quad 5/5   Ham 5/5   AT 5/5   EHL 5/5 Ken[place] 5/5   No pronator drift  Tone: Normal  Atrophy/Fasciculations: None  Sensation: Normal to light touch, symmetric, no neglect  Cerebellar: Normal finger nose finger  Gait: Antalgic, wide based.      Reflexes: 1+ throughout, symmetric, no Eladio's    IMAGING:  MR lumbar spine 5/21/2020: Chronic anterolisthesis of L4 on L5 with multilevel degenerative changes and severe stenosis at L4-L5. Right facet arthropathy and left facet defect contributed to critical right foraminal narrowing.    XR lumbar spine flexion/extension 10/13/2020: Grade 1 anterolisthesis of L4 on L5, increasing from 7 mm in neutral to 10 mm on flexion.     DEXA scan 3/6/2024: Findings consistent with osteopenia at the left femoral neck (T-score -1.6) and lumbar spine (T-score -1.1). The patient’s 10-year fracture risk remains low (3.3% for major osteoporotic fracture, 0.6% for hip fracture).  MR lumbar spine 3/18/2025: There is grade 1 anterolisthesis of L4 on L5 with associated left pars interarticularis defect. Severe central canal stenosis, bilateral lateral recess, and severe bilateral foraminal stenosis at L4-L5. These findings are markedly progressed compared to prior studies from 2020. Compression is evident on the bilateral exiting L4 nerve roots, bilateral traversing L5 roots, and cauda equina.    XR lumbar AP/lateral flex/extension 3/21/2025: Rigid grade 1 spondylolisthesis of L4 and L5 without evidence of dynamic instability.  Otherwise stable multilevel degenerative changes throughout the lumbar spine.    XR scoliosis and bilateral x-rays 3/21/2025: Well-preserved lumbar lordosis. PI?=?56°, LL?=?56°, SVA?-3?cm; left lower extremity 4?mm  shorter, mild knee varus bilaterally.    CT lumbar 3/29/2025: Grade?I L4-5 anterolisthesis with advanced right-sided facet arthropathy, developmental short pedicles, at least moderate central and foraminal stenosis; decreased bone density but pedicle landing zones acceptable.        MR cervical 4/12/2025: Multilevel spondylosis with severe central canal stenosis at?C5-6 and?C6-7, severe bilateral foraminal compromise, ventral cord effacement, and T2 hyperintensity at?C6-7 consistent with early myelomalacia. Straightened lordosis with mild reversal at?C5-6; no fracture or mass.        ASSESSMENT:  Mr. Andrea presents with clinically significant cervical myelopathy at C5-C6 and C6-C7 characterized by cord compression and T2 intramedullary signal changes, indicating a risk of progressive neurologic injury if unaddressed. He also has chronic degenerative spondylolisthesis at L4-L5 with severe stenosis; however, his most urgent risk stems from the cervical spine pathology, given the potential for permanent cord damage. His lumbar issues remain symptomatic but less acutely threatening in comparison to the cervical findings. Given the osteopenia and multifactorial degenerative changes, careful surgical planning is needed to address the cervical spine first to prevent further cord compromise and to mitigate anesthesia-related positioning risks during any future lumbar procedure.    PLAN:  - Obtain flexion/extension X-rays of the cervical spine to further assess feasibility of an anterior versus posterior surgical approach.  - Refer to podiatry for full evaluation of leg length discrepancy and malalignment, with consideration for orthotic intervention to optimize gait and potentially reduce lower extremity symptoms.  - Continue home-based exercise and stretching routine to maintain strength and flexibility in both the cervical and lumbar regions.  - Reassess in approximately 3 months (or sooner if neurological decline  occurs), at which time we will finalize the operative plan for a two-level anterior cervical discectomy and fusion (ACDF) at C5-C6 and C6-C7 if imaging and clinical status remain unchanged.  - Advised the patient that surgical intervention on the lumbar spine, if still desired, should be deferred until stabilization of cervical pathology to avoid anesthesia or positioning-related risk of cord injury.  - Educated the patient on the natural history of cervical myelopathy, emphasizing that early intervention may halt or slow progression of neurological decline.  - Counseled him regarding standard surgical risks (infection, nonunion, neurologic deficit, instrumentation failure) and the importance of strict follow-up to monitor for stability, healing, and potential improvement in arm symptoms.    Norris Zuniga MD  Neurological Surgery    [address], [address]  [place] [address]  [phone]  Pager [phone]  4/18/2025 10:25 AM      This note was created using a voice-recognition transcribing system. Incorrect words or phrases may have been missed during proofreading. Please interpret accordingly.      Total Time    Established Patient Total Time       30  minutes.      Activities       Preparing to see the patient (chart/tests/imaging review).       Obtaining and/or reviewing separately obtained history.       Performing a medically appropriate examination and/or evaluation.       Counseling and educating the patient/family/caregiver.       Ordering medications, tests, or procedures.       Referring and communicating with other health care professionals (when not separately reported).       Documenting clincal information in the electronic or other health record.       Independently interpreting results (not separately reported).    Communicating results to the patient/family/caregiver.    Care coordination (not separately reported).       [1] No Known  Allergies  [2]   Current Outpatient Medications on File Prior to Visit   Medication Sig Dispense Refill    albuterol (2.5 MG/3ML) 0.083% Inhalation Nebu Soln Take 3 mL (2.5 mg total) by nebulization every 6 (six) hours as needed. 25 each 1    celecoxib (CELEBREX) 200 MG Oral Cap Take 1 capsule (200 mg total) by mouth daily. 90 capsule 1    semaglutide (OZEMPIC, 0.25 OR 0.5 MG/DOSE,) 2 MG/3ML Subcutaneous Solution Pen-injector 0.25 mg Q week for 4 weeks then 0.5 mg Q week 1 each 12    Glucose Blood (ACCU-CHEK GUIDE TEST) In Vitro Strip 1 each by In Vitro route 2 (two) times daily. 200 strip 3    Blood Glucose Monitoring Suppl (ACCU-CHEK GUIDE) w/Device Does not apply Kit 1 kit As Directed. 1 kit 0    pregabalin 150 MG Oral Cap Take 1 capsule (150 mg total) by mouth every evening. 90 capsule 0    amitriptyline 50 MG Oral Tab Take 1 tablet (50 mg total) by mouth nightly. 90 tablet 2    Insulin Pen Needle (TECHLITE PEN NEEDLES) 32G X 6 MM Does not apply Misc Use as directed 4 times daily 400 each 3    ATORVASTATIN 40 MG Oral Tab TAKE 1 TABLET BY MOUTH NIGHTLY 100 tablet 2    Nebulizers (VIOS AEROSOL DELIVERY SYSTEM) Does not apply Misc USE WITH NEBULIZER SOLUTION      predniSONE 20 MG Oral Tab Take 1 tablet (20 mg total) by mouth daily. (Patient not taking: Reported on 4/17/2025) 6 tablet 0    tamsulosin 0.4 MG Oral Cap TAKE 1 CAPSULE BY MOUTH DAILY 100 capsule 3    Insulin Lispro Prot & Lispro (HUMALOG MIX 75/25 KWIKPEN) (75-25) 100 UNIT/ML SC SUPN INJECT 54 UNITS SUBCUTANEOUSLY  EVERY MORNING AND 45 UNITS EVERY EVENING 60 mL 4    Dapagliflozin Pro-metFORMIN ER (XIGDUO XR) 5-1000 MG Oral Tablet 24 Hr Take 1 tablet by mouth 2 (two) times daily. 200 tablet 3    losartan 25 MG Oral Tab Take 1 tablet (25 mg total) by mouth daily. 90 tablet 3    metoprolol succinate ER 25 MG Oral Tablet 24 Hr Take 1 tablet (25 mg total) by mouth daily. 100 tablet 3    TRULICITY 1.5 MG/0.5ML Subcutaneous Solution Pen-injector Inject 1.5 mg  into the skin once a week. 18 mL 1    hydrocortisone (ANUSOL-HC) 2.5 % External Cream Place 1 Application rectally 2 (two) times daily. 28 g 1    Accu-Chek Softclix Lancets Does not apply Misc 1 Lancet by Finger stick route in the morning and 1 Lancet before bedtime. 200 each 3    Glucose Blood (ACCU-CHEK DAXA PLUS) In Vitro Strip TEST TWICE DAILY 200 strip 3    tiZANidine 4 MG Oral Tab Take 1 tablet (4 mg total) by mouth every 6 (six) hours as needed. 60 tablet 0    Alcohol Swabs (ALCOHOL PADS) 70 % Does not apply Pads For use prior to checking sugar or giving insulin      aspirin EC 81 MG Oral Tab EC Take 1 tablet (81 mg total) by mouth daily. 100 tablet 2     No current facility-administered medications on file prior to visit.

## 2025-04-19 LAB
PROSTATE SPECIFIC AG: 0.6 NG/ML
PROSTATE SPECIFIC AG: 0.6 NG/ML

## 2025-04-19 RX ORDER — ERGOCALCIFEROL 1.25 MG/1
CAPSULE, LIQUID FILLED ORAL
Qty: 12 CAPSULE | Refills: 3 | Status: SHIPPED | OUTPATIENT
Start: 2025-04-19

## 2025-04-19 NOTE — PROGRESS NOTES
Please notify patient that his/her blood test showed low levels of vitamin D. A prescription was sent to his pharmacy to take one capsule or tablet, once a week for 8 weeks then one tablet/capsule once a month for 10 months. This Rx is good for one year. We'll recheck levels in one year.

## 2025-05-27 NOTE — TELEPHONE ENCOUNTER
Refill passed per [facility] protocols.    Please review pended refill request as unable to refill due to High / Very High drug interaction or warning copied here:      High  High Dose: Insulin Lispro Prot & Lispro, Subcutaneous First Dosage Period  Daily dose of 99 Units exceeds recommended maximum of 34.83 Units by 185%    High  High Dose: Insulin Lispro Prot & Lispro, 54 Units, Subcutaneous, Every morning First Dosage Part  Single dose of 54 Units exceeds recommended maximum of 17.415 Units by 211%    High  High Dose: Insulin Lispro Prot & Lispro, 45 Units, Subcutaneous, Every evening Second Dosage Part  Single dose of 45 Units exceeds recommended maximum of 17.415 Units by 159%    Requested Prescriptions   Pending Prescriptions Disp Refills    Insulin Lispro Prot & Lispro (HUMALOG MIX 75/25 KWIKPEN) (75-25) 100 UNIT/ML SC SUPN 100 mL 2     Sig: INJECT 54 UNITS SUBCUTANEOUSLY  EVERY MORNING AND 45 UNITS EVERY EVENING       Diabetes Medication Protocol Passed - 5/27/2025  5:49 PM

## 2025-05-28 RX ORDER — INSULIN LISPRO 100 [IU]/ML
INJECTION, SUSPENSION SUBCUTANEOUS
Qty: 100 ML | Refills: 2 | Status: SHIPPED | OUTPATIENT
Start: 2025-05-28

## 2025-06-09 RX ORDER — LOSARTAN POTASSIUM 25 MG/1
25 TABLET ORAL DAILY
Qty: 100 TABLET | Refills: 2 | OUTPATIENT
Start: 2025-06-09

## 2025-07-15 RX ORDER — DULAGLUTIDE 1.5 MG/.5ML
1.5 INJECTION, SOLUTION SUBCUTANEOUS WEEKLY
Qty: 6 ML | Refills: 3 | Status: SHIPPED | OUTPATIENT
Start: 2025-07-15 | End: 2025-07-21

## 2025-07-17 ENCOUNTER — LAB ENCOUNTER (OUTPATIENT)
Dept: LAB | Facility: HOSPITAL | Age: 70
End: 2025-07-17
Attending: INTERNAL MEDICINE
Payer: MEDICARE

## 2025-07-17 DIAGNOSIS — N13.8 HYPERTROPHY OF PROSTATE WITH URINARY OBSTRUCTION: Primary | ICD-10-CM

## 2025-07-17 DIAGNOSIS — N40.1 HYPERTROPHY OF PROSTATE WITH URINARY OBSTRUCTION: Primary | ICD-10-CM

## 2025-07-17 LAB — PSA SERPL-MCNC: 0.59 NG/ML (ref ?–4)

## 2025-07-17 PROCEDURE — 36415 COLL VENOUS BLD VENIPUNCTURE: CPT

## 2025-07-17 PROCEDURE — 84153 ASSAY OF PSA TOTAL: CPT

## 2025-07-21 ENCOUNTER — OFFICE VISIT (OUTPATIENT)
Dept: FAMILY MEDICINE CLINIC | Facility: CLINIC | Age: 70
End: 2025-07-21

## 2025-07-21 ENCOUNTER — LAB ENCOUNTER (OUTPATIENT)
Dept: LAB | Age: 70
End: 2025-07-21
Attending: FAMILY MEDICINE
Payer: MEDICARE

## 2025-07-21 VITALS
BODY MASS INDEX: 40.66 KG/M2 | WEIGHT: 253 LBS | HEART RATE: 60 BPM | DIASTOLIC BLOOD PRESSURE: 76 MMHG | SYSTOLIC BLOOD PRESSURE: 130 MMHG | HEIGHT: 66 IN

## 2025-07-21 DIAGNOSIS — I10 ESSENTIAL HYPERTENSION: ICD-10-CM

## 2025-07-21 DIAGNOSIS — I10 ESSENTIAL HYPERTENSION: Primary | ICD-10-CM

## 2025-07-21 LAB
ANION GAP SERPL CALC-SCNC: 6 MMOL/L (ref 0–18)
BUN BLD-MCNC: 15 MG/DL (ref 9–23)
BUN/CREAT SERPL: 16.7 (ref 10–20)
CALCIUM BLD-MCNC: 9.6 MG/DL (ref 8.7–10.4)
CHLORIDE SERPL-SCNC: 107 MMOL/L (ref 98–112)
CO2 SERPL-SCNC: 26 MMOL/L (ref 21–32)
CREAT BLD-MCNC: 0.9 MG/DL (ref 0.7–1.3)
EGFRCR SERPLBLD CKD-EPI 2021: 92 ML/MIN/1.73M2 (ref 60–?)
FASTING STATUS PATIENT QL REPORTED: NO
GLUCOSE BLD-MCNC: 141 MG/DL (ref 70–99)
OSMOLALITY SERPL CALC.SUM OF ELEC: 291 MOSM/KG (ref 275–295)
POTASSIUM SERPL-SCNC: 5 MMOL/L (ref 3.5–5.1)
SODIUM SERPL-SCNC: 139 MMOL/L (ref 136–145)

## 2025-07-21 PROCEDURE — 3061F NEG MICROALBUMINURIA REV: CPT | Performed by: FAMILY MEDICINE

## 2025-07-21 PROCEDURE — 36415 COLL VENOUS BLD VENIPUNCTURE: CPT

## 2025-07-21 PROCEDURE — 1159F MED LIST DOCD IN RCRD: CPT | Performed by: FAMILY MEDICINE

## 2025-07-21 PROCEDURE — 80048 BASIC METABOLIC PNL TOTAL CA: CPT

## 2025-07-21 PROCEDURE — 1125F AMNT PAIN NOTED PAIN PRSNT: CPT | Performed by: FAMILY MEDICINE

## 2025-07-21 PROCEDURE — 99213 OFFICE O/P EST LOW 20 MIN: CPT | Performed by: FAMILY MEDICINE

## 2025-07-21 PROCEDURE — 3008F BODY MASS INDEX DOCD: CPT | Performed by: FAMILY MEDICINE

## 2025-07-21 PROCEDURE — 3078F DIAST BP <80 MM HG: CPT | Performed by: FAMILY MEDICINE

## 2025-07-21 PROCEDURE — 3075F SYST BP GE 130 - 139MM HG: CPT | Performed by: FAMILY MEDICINE

## 2025-07-21 RX ORDER — BLOOD SUGAR DIAGNOSTIC
1 STRIP MISCELLANEOUS 2 TIMES DAILY
Qty: 200 STRIP | Refills: 3 | Status: SHIPPED | OUTPATIENT
Start: 2025-07-21

## 2025-07-21 RX ORDER — METOPROLOL SUCCINATE 50 MG/1
50 TABLET, EXTENDED RELEASE ORAL DAILY
Qty: 90 TABLET | Refills: 1 | Status: SHIPPED | OUTPATIENT
Start: 2025-07-21

## 2025-07-21 RX ORDER — TAMSULOSIN HYDROCHLORIDE 0.4 MG/1
CAPSULE ORAL
Qty: 100 CAPSULE | Refills: 3 | Status: SHIPPED | OUTPATIENT
Start: 2025-07-21

## 2025-07-21 RX ORDER — LOSARTAN POTASSIUM 50 MG/1
50 TABLET ORAL DAILY
Qty: 90 TABLET | Refills: 1 | Status: SHIPPED | OUTPATIENT
Start: 2025-07-21

## 2025-07-21 RX ORDER — DULAGLUTIDE 1.5 MG/.5ML
1.5 INJECTION, SOLUTION SUBCUTANEOUS WEEKLY
Qty: 6 ML | Refills: 3 | Status: SHIPPED | OUTPATIENT
Start: 2025-07-21

## 2025-07-21 RX ORDER — INSULIN LISPRO 100 [IU]/ML
INJECTION, SUSPENSION SUBCUTANEOUS
Qty: 100 ML | Refills: 2 | Status: SHIPPED | OUTPATIENT
Start: 2025-07-21

## 2025-07-21 NOTE — PROGRESS NOTES
7/21/2025  9:41 AM    Beto Andrea is a 69 year old male.    Chief complaint(s):   Chief Complaint   Patient presents with    HTN     F/u      HPI:     Beto Andrea primary complaint is regarding HTN.     Beto Andreais a 69 year old male presents with hypertension.  This was first diagnosed more than 5 years ago.  Current nonpharmacologic treatment includes low sodium diet, exercise, and meditation.  his current cardiac medication(s) regimen includes: Metoprolol 50 mg Q day, Losaratn 50 mg increased by himself 1 month ago.  He has not kept a blood pressure diary, but states that his blood pressures have been fair controll.  He is tolerating his medication(s)  well without side effects.  Compliance with treatment has been good.       HISTORY:  Past Medical History[1]   Past Surgical History[2]   Family History[3]   Social History: Short Social Hx on File[4]     Immunizations:   Immunization History   Administered Date(s) Administered    Covid-19 Vaccine Pfizer 30 mcg/0.3 ml 12/23/2021    Covid-19 Vaccine Pfizer Bivalent 30mcg/0.3mL 09/28/2022    FLU VAC High Dose 65 YRS & Older PRSV Free (49094) 11/09/2021    FLULAVAL 6 months & older 0.5 ml Prefilled syringe (83864) 09/14/2020    FLUZONE 6 months and older PFS 0.5 ml (20062) 08/31/2017, 09/26/2018    Fluvirin, 3 Years & >, Im 09/09/2013, 10/21/2014    HEP A/HEP B Combined 08/25/2016    HEP B 02/24/2015, 11/07/2015    Hep B, Unspecified Formulation 02/24/2015, 11/07/2015    Influenza 11/07/2015, 11/21/2016    Pfizer Covid-19 Vaccine 30mcg/0.3ml 12yrs+ 09/22/2023    Pneumococcal Conjugate PCV20 08/05/2024    Pneumovax 23 05/15/2020    TDAP 02/24/2015, 02/25/2019    Zoster Vaccine Live (Zostavax) 03/03/2014, 11/08/2017    Zoster Vaccine Recombinant Adjuvanted (Shingrix) 10/11/2022, 05/23/2023       Medications (Active prior to today's visit):  Current Medications[5]    Allergies:  Allergies[6]      ROS:   Review of Systems   Constitutional:  Negative  for appetite change, fatigue and fever.   Respiratory:  Negative for shortness of breath.    Cardiovascular:  Negative for chest pain, palpitations and leg swelling.   Gastrointestinal:  Negative for abdominal pain.   Musculoskeletal:  Negative for myalgias.   Skin:  Negative for rash.   Neurological:  Negative for dizziness, weakness and headaches.       PHYSICAL EXAM:   VS: /76 (BP Location: Right arm, Patient Position: Sitting, Cuff Size: adult)   Pulse 60   Ht 5' 6\" (1.676 m)   Wt 253 lb (114.8 kg)   BMI 40.84 kg/m²     Physical Exam  Vitals reviewed.   Constitutional:       Appearance: Normal appearance. He is well-developed.   HENT:      Head: Normocephalic.   Eyes:      General: No scleral icterus.     Conjunctiva/sclera: Conjunctivae normal.   Cardiovascular:      Rate and Rhythm: Normal rate.   Pulmonary:      Effort: Pulmonary effort is normal.   Musculoskeletal:      Cervical back: Neck supple.   Skin:     Findings: No rash.   Psychiatric:         Mood and Affect: Mood normal.         LABORATORY RESULTS:   No results found for: \"URCOLOR\", \"URCLA\", \"URINELEUK\", \"URINENITRITE\", \"URINEBLOOD\"   Results for orders placed or performed in visit on 25   PSA Total, Diagnostic    Collection Time: 25 12:56 PM   Result Value Ref Range    Total PSA  0.59 <=4.00 ng/mL       EKG / Spirometry : -     Radiology: No results found.     ASSESSMENT/PLAN:   Assessment   Encounter Diagnosis   Name Primary?    Essential hypertension Yes      HTN     MEDICATIONS:   Requested Prescriptions     Signed Prescriptions Disp Refills    tamsulosin 0.4 MG Oral Cap 100 capsule 3     Sig: TAKE 1 CAPSULE BY MOUTH DAILY    Glucose Blood (ACCU-CHEK GUIDE TEST) In Vitro Strip 200 strip 3     Si each by In Vitro route 2 (two) times daily.    losartan 50 MG Oral Tab 90 tablet 1     Sig: Take 1 tablet (50 mg total) by mouth in the morning.    metoprolol succinate ER 50 MG Oral Tablet 24 Hr 90 tablet 1     Sig: Take 1  tablet (50 mg total) by mouth in the morning.    Insulin Lispro Prot & Lispro (HUMALOG MIX 75/25 KWIKPEN) (75-25) 100 UNIT/ML SC SUPN 100 mL 2     Sig: Inject 54 Units into the skin every morning AND 45 Units every evening.    TRULICITY 1.5 MG/0.5ML Subcutaneous Solution Auto-injector 6 mL 3     Sig: Inject 1.5 mg into the skin once a week.      LABORATORY & ORDERS:   Orders Placed This Encounter   Procedures    Basic Metabolic Panel (8)     RECOMMENDATIONS given include: avoid pseudoephedrine or other stimulants/decongestants in common cold remedies, decrease consumption of alcohol, perform routine monitoring of blood pressure with home blood pressure cuff, exercise, reduction of dietary salt intake, take medication as prescribed, try not to miss doses, smoking cessation, weight loss, and stress reduction.    FOLLOW-UP: Schedule a follow-up visit in 6 months.              Orders This Visit:  Orders Placed This Encounter   Procedures    Basic Metabolic Panel (8)       Meds This Visit:  Requested Prescriptions     Signed Prescriptions Disp Refills    tamsulosin 0.4 MG Oral Cap 100 capsule 3     Sig: TAKE 1 CAPSULE BY MOUTH DAILY    Glucose Blood (ACCU-CHEK GUIDE TEST) In Vitro Strip 200 strip 3     Si each by In Vitro route 2 (two) times daily.    losartan 50 MG Oral Tab 90 tablet 1     Sig: Take 1 tablet (50 mg total) by mouth in the morning.    metoprolol succinate ER 50 MG Oral Tablet 24 Hr 90 tablet 1     Sig: Take 1 tablet (50 mg total) by mouth in the morning.    Insulin Lispro Prot & Lispro (HUMALOG MIX 75/25 KWIKPEN) (75-25) 100 UNIT/ML SC SUPN 100 mL 2     Sig: Inject 54 Units into the skin every morning AND 45 Units every evening.    TRULICITY 1.5 MG/0.5ML Subcutaneous Solution Auto-injector 6 mL 3     Sig: Inject 1.5 mg into the skin once a week.       Imaging & Referrals:  None         RADHA THORNTON MD       [1]   Past Medical History:   Cataracts, bilateral    Diabetes (HCC)    Essential  hypertension    High blood pressure    Hyperlipidemia   [2]   Past Surgical History:  Procedure Laterality Date    Colonoscopy N/A 2020    Procedure: COLONOSCOPY;  Surgeon: Zbigniew Hooker MD;  Location: [facility] ENDOSCOPY    Laminectomy      Ligmt revision,knee,intra-artic Left    [3]   Family History  Problem Relation Age of Onset    Diabetes Mother     Diabetes Sister    [4]   Social History  Socioeconomic History    Marital status: Single   Tobacco Use    Smoking status: Former     Current packs/day: 0.00     Types: Cigarettes     Start date: 6/10/1973     Quit date: 6/10/1993     Years since quittin.1     Passive exposure: Never    Smokeless tobacco: Never   Vaping Use    Vaping status: Never Used   Substance and Sexual Activity    Alcohol use: Yes    Drug use: No   Other Topics Concern    Caffeine Concern Yes     Comment: daily 2 cups    Exercise Yes     Comment: walking 20 min daily   Social History Narrative    The patient does not use an assistive device..      The patient does live in a home with stairs.     Social Drivers of Health     Food Insecurity: Food Insecurity Present (3/6/2025)    NCSS - Food Insecurity     Worried About Running Out of Food in the Last Year: No     Ran Out of Food in the Last Year: Yes   Transportation Needs: No Transportation Needs (3/6/2025)    NCSS - Transportation     Lack of Transportation: No   Housing Stability: Not At Risk (3/6/2025)    NCSS - Housing/Utilities     Has Housing: Yes     Worried About Losing Housing: No     Unable to Get Utilities: No   [5]   Current Outpatient Medications   Medication Sig Dispense Refill    tamsulosin 0.4 MG Oral Cap TAKE 1 CAPSULE BY MOUTH DAILY 100 capsule 3    Glucose Blood (ACCU-CHEK GUIDE TEST) In Vitro Strip 1 each by In Vitro route 2 (two) times daily. 200 strip 3    losartan 50 MG Oral Tab Take 1 tablet (50 mg total) by mouth in the morning. 90 tablet 1    metoprolol succinate ER 50 MG Oral Tablet 24 Hr Take 1  tablet (50 mg total) by mouth in the morning. 90 tablet 1    Insulin Lispro Prot & Lispro (HUMALOG MIX 75/25 KWIKPEN) (75-25) 100 UNIT/ML SC SUPN Inject 54 Units into the skin every morning AND 45 Units every evening. 100 mL 2    TRULICITY 1.5 MG/0.5ML Subcutaneous Solution Auto-injector Inject 1.5 mg into the skin once a week. 6 mL 3    ergocalciferol 1.25 MG (44665 UT) Oral Cap Take 1 tab po Q week for 2 months then 1 tab po Q month for 10 months. 12 capsule 3    albuterol (2.5 MG/3ML) 0.083% Inhalation Nebu Soln Take 3 mL (2.5 mg total) by nebulization every 6 (six) hours as needed. 25 each 1    celecoxib (CELEBREX) 200 MG Oral Cap Take 1 capsule (200 mg total) by mouth daily. 90 capsule 1    pregabalin 150 MG Oral Cap Take 1 capsule (150 mg total) by mouth every evening. 90 capsule 0    amitriptyline 50 MG Oral Tab Take 1 tablet (50 mg total) by mouth nightly. 90 tablet 2    Insulin Pen Needle (TECHLITE PEN NEEDLES) 32G X 6 MM Does not apply Misc Use as directed 4 times daily 400 each 3    ATORVASTATIN 40 MG Oral Tab TAKE 1 TABLET BY MOUTH NIGHTLY 100 tablet 2    Nebulizers (Alea AEROSOL DELIVERY SYSTEM) Does not apply Misc USE WITH NEBULIZER SOLUTION      Dapagliflozin Pro-metFORMIN ER (XIGDUO XR) 5-1000 MG Oral Tablet 24 Hr Take 1 tablet by mouth 2 (two) times daily. 200 tablet 3    hydrocortisone (ANUSOL-HC) 2.5 % External Cream Place 1 Application rectally 2 (two) times daily. 28 g 1    Accu-Chek Softclix Lancets Does not apply Misc 1 Lancet by Finger stick route in the morning and 1 Lancet before bedtime. 200 each 3    Glucose Blood (ACCU-CHEK DAXA PLUS) In Vitro Strip TEST TWICE DAILY 200 strip 3    tiZANidine 4 MG Oral Tab Take 1 tablet (4 mg total) by mouth every 6 (six) hours as needed. 60 tablet 0    Alcohol Swabs (ALCOHOL PADS) 70 % Does not apply Pads For use prior to checking sugar or giving insulin      aspirin EC 81 MG Oral Tab EC Take 1 tablet (81 mg total) by mouth daily. 100 tablet 2   [6] No  Known Allergies

## 2025-07-23 ENCOUNTER — MED REC SCAN ONLY (OUTPATIENT)
Dept: FAMILY MEDICINE CLINIC | Facility: CLINIC | Age: 70
End: 2025-07-23

## 2025-07-24 ENCOUNTER — OFFICE VISIT (OUTPATIENT)
Dept: PHYSICAL MEDICINE AND REHAB | Facility: CLINIC | Age: 70
End: 2025-07-24
Payer: COMMERCIAL

## 2025-07-24 VITALS — BODY MASS INDEX: 40.66 KG/M2 | WEIGHT: 253 LBS | HEIGHT: 66 IN

## 2025-07-24 DIAGNOSIS — G62.9 POLYNEUROPATHY: ICD-10-CM

## 2025-07-24 DIAGNOSIS — M48.062 LUMBAR STENOSIS WITH NEUROGENIC CLAUDICATION: ICD-10-CM

## 2025-07-24 PROCEDURE — 1159F MED LIST DOCD IN RCRD: CPT | Performed by: PHYSICAL MEDICINE & REHABILITATION

## 2025-07-24 PROCEDURE — 3008F BODY MASS INDEX DOCD: CPT | Performed by: PHYSICAL MEDICINE & REHABILITATION

## 2025-07-24 PROCEDURE — 99214 OFFICE O/P EST MOD 30 MIN: CPT | Performed by: PHYSICAL MEDICINE & REHABILITATION

## 2025-07-24 PROCEDURE — 1160F RVW MEDS BY RX/DR IN RCRD: CPT | Performed by: PHYSICAL MEDICINE & REHABILITATION

## 2025-07-24 RX ORDER — PREGABALIN 150 MG/1
150 CAPSULE ORAL EVERY EVENING
Qty: 90 CAPSULE | Refills: 0 | Status: SHIPPED | OUTPATIENT
Start: 2025-07-24

## 2025-07-24 RX ORDER — AMITRIPTYLINE HYDROCHLORIDE 50 MG/1
50 TABLET ORAL NIGHTLY
Qty: 90 TABLET | Refills: 2 | Status: SHIPPED | OUTPATIENT
Start: 2025-07-24

## 2025-07-24 NOTE — PROGRESS NOTES
The following individual(s) verbally consented to be recorded using ambient AI listening technology and understand that they can each withdraw their consent to this listening technology at any point by asking the clinician to turn off or pause the recording:    Patient name: Beto Andrea    Progress note    C/C:   Chief Complaint   Patient presents with    Low Back Pain     LOV04/04/25 pt presents today with Lumbar stenosis with neurogenic claudication.pt pain level is a 8/10 and he is taking tylenol and medication does help.         History of Present Illness  Beto Andrea is a 69 year old male with spinal stenosis who presents with worsening back and bilateral leg pain.    He experiences significant pain in his back and legs, which worsens with physical activity such as walking and exercise. The pain is primarily on the left side greater than right, radiating down the leg, and is more pronounced when he starts walking. He also has pain in his neck, which is more severe than the lower back pain.    MRI confirms narrowing in both the neck and lower back. The neck pain is more severe than the lower back pain, but the leg pain is more debilitating than back or neck pain.    He experiences numbness in his right little finger, persistent for a couple of years, and occasional balance issues. He also has difficulty with grasp that he attributes to joint pain when he attempts to fully flex his fingers.     Has been seen by neurosurgery who ordered MRI of the cervical spine due to hand pain, right hand numbness, and abnormal balance. States he was recommended surgery but very much does not want to do this.     He takes pregabalin at night He was previously prescribed amitriptyline but was unable to fill it in [place].    Past Medical History:  No date: Cataracts, bilateral  No date: Diabetes (HCC)  No date: Essential hypertension  No date: High blood pressure  No date: Hyperlipidemia     Current Medications[1]      Allergies  Review status set to Review Complete by Steff Emanuel MA on 7/24/2025   No Known Allergies         Physical exam:  Ht 66\"   Wt 253 lb (114.8 kg)   BMI 40.84 kg/m²      Lumbar spine exam:    No skin rash lumbar/upper sacral region  No pain with lumbar flexion. No pain with lumbar extension.  5/5 LE strength b/l  1/4 quad, gastrocs reflexes b/l    Slight weakness right 4th and 5th finger flexors. 4/5 right FDI, ADM. Otherwise normal shoulder abduction, elbow flexion, extension, wrist extension  2/4 BUE reflexes  Crews test negative b/l    IMAGING: MRI cervical spine dated 4/12/2025 independently reviewed,report.  He has severe central stenosis at C5-C6 and C6-C7.  On STIR imaging there is abnormal signal of the cord about the C6-7 level.  Assessment & Plan  Cervical stenosis with myelopathy  Presumed to be contributing to abnormal balance, right hand numbness and weakness. Surgery was declined due to age concerns. Not overly weak in the upper extremities, and no weakness in b/l LE.     Lumbar stenosis with neurogenic claudication    Bilateral leg pain, worsened by standing and walking. MRI shows severe lumbar stenosis. Balance issues may be related to both cervical and lumbar stenosis. Provide exercises to strengthen buttock and abdominal muscles. Provide prescription for physical therapy for him to pursue once he comes back from [place]. Continue current medication regimen - pregabalin 150mg daily, amitritpyline 50mg at bedtime.     If balance or weakness worsens needs to follow up with neurosurgery urgently, vs going to the ER.     Rojelio Mercedes DO  Physical Medicine and Rehabilitation  [facility]         [1]    amitriptyline 50 MG Oral Tab Take 1 tablet (50 mg total) by mouth nightly. 90 tablet 2    pregabalin 150 MG Oral Cap Take 1 capsule (150 mg total) by mouth every evening. 90 capsule 0    tamsulosin 0.4 MG Oral Cap TAKE 1 CAPSULE BY MOUTH DAILY 100 capsule 3     Glucose Blood (ACCU-CHEK GUIDE TEST) In Vitro Strip 1 each by In Vitro route 2 (two) times daily. 200 strip 3    losartan 50 MG Oral Tab Take 1 tablet (50 mg total) by mouth in the morning. 90 tablet 1    metoprolol succinate ER 50 MG Oral Tablet 24 Hr Take 1 tablet (50 mg total) by mouth in the morning. 90 tablet 1    Insulin Lispro Prot & Lispro (HUMALOG MIX 75/25 KWIKPEN) (75-25) 100 UNIT/ML SC SUPN Inject 54 Units into the skin every morning AND 45 Units every evening. 100 mL 2    TRULICITY 1.5 MG/0.5ML Subcutaneous Solution Auto-injector Inject 1.5 mg into the skin once a week. 6 mL 3    ergocalciferol 1.25 MG (10834 UT) Oral Cap Take 1 tab po Q week for 2 months then 1 tab po Q month for 10 months. 12 capsule 3    albuterol (2.5 MG/3ML) 0.083% Inhalation Nebu Soln Take 3 mL (2.5 mg total) by nebulization every 6 (six) hours as needed. 25 each 1    celecoxib (CELEBREX) 200 MG Oral Cap Take 1 capsule (200 mg total) by mouth daily. 90 capsule 1    Insulin Pen Needle (TECHLITE PEN NEEDLES) 32G X 6 MM Does not apply Misc Use as directed 4 times daily 400 each 3    ATORVASTATIN 40 MG Oral Tab TAKE 1 TABLET BY MOUTH NIGHTLY 100 tablet 2    Nebulizers (VIOS AEROSOL DELIVERY SYSTEM) Does not apply Misc USE WITH NEBULIZER SOLUTION      Dapagliflozin Pro-metFORMIN ER (XIGDUO XR) 5-1000 MG Oral Tablet 24 Hr Take 1 tablet by mouth 2 (two) times daily. 200 tablet 3    hydrocortisone (ANUSOL-HC) 2.5 % External Cream Place 1 Application rectally 2 (two) times daily. 28 g 1    Accu-Chek Softclix Lancets Does not apply Misc 1 Lancet by Finger stick route in the morning and 1 Lancet before bedtime. 200 each 3    Glucose Blood (ACCU-CHEK DAXA PLUS) In Vitro Strip TEST TWICE DAILY 200 strip 3    tiZANidine 4 MG Oral Tab Take 1 tablet (4 mg total) by mouth every 6 (six) hours as needed. 60 tablet 0    Alcohol Swabs (ALCOHOL PADS) 70 % Does not apply Pads For use prior to checking sugar or giving insulin      aspirin EC 81 MG Oral  Tab EC Take 1 tablet (81 mg total) by mouth daily. 100 tablet 2

## 2025-08-05 RX ORDER — LOSARTAN POTASSIUM 25 MG/1
25 TABLET ORAL DAILY
Qty: 60 TABLET | Refills: 5 | OUTPATIENT
Start: 2025-08-05

## (undated) DIAGNOSIS — M54.16 LEFT LUMBAR RADICULITIS: ICD-10-CM

## (undated) DEVICE — Device: Brand: DEFENDO AIR/WATER/SUCTION AND BIOPSY VALVE

## (undated) DEVICE — MEDI-VAC NON-CONDUCTIVE SUCTION TUBING 6MM X 1.8M (6FT.) L: Brand: CARDINAL HEALTH

## (undated) DEVICE — Device: Brand: CUSTOM PROCEDURE KIT

## (undated) DEVICE — LINE MNTR ADLT SET O2 INTMD

## (undated) DEVICE — STERILE LATEX POWDER-FREE SURGICAL GLOVESWITH NITRILE COATING: Brand: PROTEXIS

## (undated) DEVICE — 35 ML SYRINGE REGULAR TIP: Brand: MONOJECT

## (undated) NOTE — LETTER
[address], [facility]   Date:   11/10/2022     Name:   Forest Siemens    YOB: 1955   MRN:   [id number]       WHERE IS YOUR PAIN NOW? Brenda the areas on your body where you feel the described sensations. Use the appropriate symbol. Jassi Sury the areas of radiation. Include all affected areas. Just to complete the picture, please draw in the face. ACHE:  ^ ^ ^   NUMBNESS:  0000   PINS & NEEDLES:  = = = =                              ^ ^ ^                       0000              = = = =                                    ^ ^ ^                       0000            = = = =      BURNING:  XXXX   STABBING: ////                  XXXX                ////                         XXXX          ////     Please brenda the line below indicating your degree of pain right now  with 0 being no pain 10 being the worst pain possible.                                          0             1             2              3             4              5              6              7             8             9             10         Patient Signature:

## (undated) NOTE — LETTER
[facility] [address], [facility]   Date:   5/25/2023     Name:   True Bateman    YOB: 1955   MRN:   [id number]       WHERE IS YOUR PAIN NOW? Brenda the areas on your body where you feel the described sensations. Use the appropriate symbol. Sita Showers the areas of radiation. Include all affected areas. Just to complete the picture, please draw in the face. ACHE:  ^ ^ ^   NUMBNESS:  0000   PINS & NEEDLES:  = = = =                              ^ ^ ^                       0000              = = = =                                    ^ ^ ^                       0000            = = = =      BURNING:  XXXX   STABBING: ////                  XXXX                ////                         XXXX          ////     Please brenda the line below indicating your degree of pain right now  with 0 being no pain 10 being the worst pain possible.                                          0             1             2              3             4              5              6              7             8             9             10         Patient Signature:

## (undated) NOTE — LETTER
[address], [facility]   Date:   12/29/2021     Name:   Apple Morrell    YOB: 1955   MRN:   [id number]       WHERE IS YOUR PAIN NOW?   Bam the areas on your body where you feel the de

## (undated) NOTE — LETTER
WHERE IS YOUR PAIN NOW?  Brenda the areas on your body where you feel the described sensations.  Use the appropriate symbol.  Brenda the areas of radiation.  Include all affected areas.  Just to complete the picture, please draw in the face.     ACHE:  ^ ^ ^   NUMBNESS:  0000   PINS & NEEDLES:  = = = =                              ^ ^ ^                       0000              = = = =                                    ^ ^ ^                       0000            = = = =      BURNING:  XXXX   STABBING: ////                  XXXX                ////                         XXXX          ////     Please brenda the line below indicating your degree of pain right now  with 0 being no pain 10 being the worst pain possible.                                         0             1             2              3             4              5              6              7             8             9             10         Patient Signature:

## (undated) NOTE — LETTER
[address], [facility]   Date:   9/28/2021     Name:   Shawnee Lopez    YOB: 1955   MRN:   [id number]       WHERE IS YOUR PAIN NOW?   Breann Boone the areas on your body where you feel the rupal

## (undated) NOTE — LETTER
[facility] [address], [facility]   Date:   10/12/2023     Name:   Eladio Khan    YOB: 1955   MRN:   [id number]       WHERE IS YOUR PAIN NOW? Brenda the areas on your body where you feel the described sensations. Use the appropriate symbol. Katt Jansen the areas of radiation. Include all affected areas. Just to complete the picture, please draw in the face. ACHE:  ^ ^ ^   NUMBNESS:  0000   PINS & NEEDLES:  = = = =                              ^ ^ ^                       0000              = = = =                                    ^ ^ ^                       0000            = = = =      BURNING:  XXXX   STABBING: ////                  XXXX                ////                         XXXX          ////     Please brenda the line below indicating your degree of pain right now  with 0 being no pain 10 being the worst pain possible.                                          0             1             2              3             4              5              6              7             8             9             10         Patient Signature:

## (undated) NOTE — LETTER
[address]   Date:   5/27/2021     Name:   Jamie Garvey    YOB: 1955   MRN:   [id number]       WHERE IS YOUR PAIN NOW? Bam the areas on your body where you feel the described sensations.   Use the appropriate

## (undated) NOTE — LETTER
[address], [facility]   Date:   2/22/2022     Name:   Eris Adams    YOB: 1955   MRN:   [id number]       WHERE IS YOUR PAIN NOW? Bam the areas on your body where you feel the described sensations. Use the appropriate symbol. [facility] the areas of radiation. Include all affected areas. Just to complete the picture, please draw in the face. ACHE:  ^ ^ ^   NUMBNESS:  0000   PINS & NEEDLES:  = = = =                              ^ ^ ^                       0000              = = = =                                    ^ ^ ^                       0000            = = = =      BURNING:  XXXX   STABBING: ////                  XXXX                ////                         XXXX          ////     Please bam the line below indicating your degree of pain right now  with 0 being no pain 10 being the worst pain possible.                                          0             1             2              3             4              5              6              7             8             9             10         Patient Signature:

## (undated) NOTE — LETTER
[address]   Date:   9/15/2020     Name:   Yelena Davenport    YOB: 1955   MRN:   [id number]       WHERE IS YOUR PAIN NOW? Bam the areas on your body where you feel the described sensations.   Use the appropriate

## (undated) NOTE — LETTER
June 12, 2019     Jamie Seeds  [address]      Dear Jerri Dixon:    Below are the results from your recent visit:the following results are within normal limits:  psa.      Resulted Orders   PSA TOTAL W REFLEX TO FREE   Resul

## (undated) NOTE — LETTER
[address]   Date:   10/15/2020     Name:   Francella Peabody    YOB: 1955   MRN:   [id number]       WHERE IS YOUR PAIN NOW? Bam the areas on your body where you feel the described sensations.   Use the appropriat